# Patient Record
Sex: FEMALE | Race: WHITE | NOT HISPANIC OR LATINO | Employment: FULL TIME | URBAN - METROPOLITAN AREA
[De-identification: names, ages, dates, MRNs, and addresses within clinical notes are randomized per-mention and may not be internally consistent; named-entity substitution may affect disease eponyms.]

---

## 2017-01-30 ENCOUNTER — ALLSCRIPTS OFFICE VISIT (OUTPATIENT)
Dept: OTHER | Facility: OTHER | Age: 27
End: 2017-01-30

## 2017-01-31 ENCOUNTER — LAB CONVERSION - ENCOUNTER (OUTPATIENT)
Dept: OTHER | Facility: OTHER | Age: 27
End: 2017-01-31

## 2017-02-06 ENCOUNTER — ALLSCRIPTS OFFICE VISIT (OUTPATIENT)
Dept: OTHER | Facility: OTHER | Age: 27
End: 2017-02-06

## 2017-03-20 ENCOUNTER — ALLSCRIPTS OFFICE VISIT (OUTPATIENT)
Dept: OTHER | Facility: OTHER | Age: 27
End: 2017-03-20

## 2017-04-17 ENCOUNTER — ALLSCRIPTS OFFICE VISIT (OUTPATIENT)
Dept: OTHER | Facility: OTHER | Age: 27
End: 2017-04-17

## 2017-04-20 ENCOUNTER — ALLSCRIPTS OFFICE VISIT (OUTPATIENT)
Dept: OTHER | Facility: OTHER | Age: 27
End: 2017-04-20

## 2017-04-25 LAB
CHLAMYDIA, DNA PROBE (HISTORICAL): NOT DETECTED
N GONORRHOEAE, AMPLIFIED DNA (HISTORICAL): NOT DETECTED

## 2017-08-01 ENCOUNTER — ALLSCRIPTS OFFICE VISIT (OUTPATIENT)
Dept: OTHER | Facility: OTHER | Age: 27
End: 2017-08-01

## 2017-12-05 ENCOUNTER — OFFICE VISIT (OUTPATIENT)
Dept: URGENT CARE | Facility: CLINIC | Age: 27
End: 2017-12-05
Payer: COMMERCIAL

## 2017-12-05 PROCEDURE — G0382 LEV 3 HOSP TYPE B ED VISIT: HCPCS

## 2017-12-05 PROCEDURE — 99283 EMERGENCY DEPT VISIT LOW MDM: CPT

## 2017-12-07 NOTE — PROGRESS NOTES
Assessment  1  Prepatellar bursitis of left knee (453 65) (T68 42)    Plan  Prepatellar bursitis of left knee    · Cephalexin 500 MG Oral Capsule; TAKE 1 CAPSULE 3 TIMES DAILY UNTIL GONE    Discussion/Summary  Discussion Summary:   Start antibiotic intake as directed  Recommend warm compresses  If not improving over the next week, follow up with PCP  Medication Side Effects Reviewed: Possible side effects of new medications were reviewed with the patient/guardian today  Understands and agrees with treatment plan: The treatment plan was reviewed with the patient/guardian  The patient/guardian understands and agrees with the treatment plan      Chief Complaint    1  Skin Wound  Chief Complaint Free Text Note Form: Pt c/o a reddened lump on her left knee  History of Present Illness  HPI: 25-year-old female here with red lump on her left knee  Has full range of motion in the knee  States that she tried to poke it with hot needle and it did not help it  Now her whole left leg kind of hurts  Denies any fever or chills  Hospital Based Practices Required Assessment:  Abuse And Domestic Violence Screen   Yes, the patient is safe at home  -- The patient states no one is hurting them  Depression And Suicide Screen  No, the patient has not had thoughts of hurting themself  No, the patient has not felt depressed in the past 7 days  Prefered Language is  [place]  Primary Language is  English  Skin Wound: HOLLY FERNANDEZ presents with complaints of skin wound  Review of Systems  Focused-Female:  Constitutional: No fever, no chills, feels well, no tiredness, no recent weight gain or loss  Musculoskeletal: as noted in HPI  ROS Reviewed:   ROS reviewed  Active Problems  1  Encounter for gynecological examination without abnormal finding (V72 31) (Z01 419)   2  History of hysterectomy (V88 01) (Z90 710)    Past Medical History  1  History of Acute sinusitis (461 9) (J01 90)   2   History of Anxiety and depression (300 00,311) (F41 8)   3  History of Bacterial vaginosis (616 10,041 9) (N76 0,B96 89)   4  History of Dysuria (788 1) (R30 0)   5  History of Endometriosis (617 9) (N80 9)   6  History of Female pelvic pain (625 9) (R10 2)   7  History of Granulation tissue at vaginal vault (623 8) (N89 8)   8  History of acute pharyngitis (V12 69) (Z87 09)   9  History of depression (V11 8) (Z86 59)   10  History of fatigue (V13 89) (Z87 898)   11  History of headache (V13 89) (Z87 898)   12  History of headache (V13 89) (Z87 898)   13  History of trichomoniasis (V12 09) (Z86 19)   14  History of vaginal discharge (V13 29) (Z87 42)   15  History of Insect bite, initial encounter (919 4,E906 4) (W57 XXXA)   16  Need for prophylactic vaccination and inoculation against influenza (V04 81) (Z23)   17  History of Need for Tdap vaccination (V06 1) (Z23)   18  History of Normal pregnancy (V22 2) (Z34 90)   19  History of Postoperative examination (V67 00) (Z09)   20  History of Post-operative state (V45 89) (Z98 890)   21  History of Scabies (133 0) (B86)   22  History of Screening for depression (V79 0) (Z13 89)   23  History of Sterilization consult (V25 09) (Z30 09)   24  History of UTI (urinary tract infection) (599 0) (N39 0)   25  Vaginal delivery (V13 29) (Z87 42)   26  History of Vaginal spotting (623 8) (N92 0)  Active Problems And Past Medical History Reviewed: The active problems and past medical history were reviewed and updated today  Family History  Mother    1  Family history of sleep apnea (V19 8) (Z82 0)  Paternal Grandfather    2  Family history of Parkinson's disease (V17 2) (Z82 0)  Family History Reviewed: The family history was reviewed and updated today  Social History   · Being A Social Drinker   · Former smoker (N98 12) (O51 808)   · No living will   · Problem with, marital (V61 10) (Z63 0)  Social History Reviewed: The social history was reviewed and updated today   The social history was reviewed and is unchanged  Surgical History    1  History of Appendectomy   2  History of Laparoscop Fulguration Uterine Surface Endometriotic Tissue   3  History of Tubal Ligation   4  History of Vaginal Hysterectomy  Surgical History Reviewed: The surgical history was reviewed and updated today  Current Meds   1  Multi Vitamin Daily Oral Tablet; TAKE 1 TABLET DAILY; Therapy: [id number] to Recorded  Medication List Reviewed: The medication list was reviewed and updated today  Allergies  1  No Known Drug Allergies    2  No Known Environmental Allergies   3  No Known Food Allergies    Vitals  Signs   Recorded: 09OIT1851 06:44PM   Temperature: 97 7 F  Heart Rate: 86  Respiration: 18  Systolic: 526  Diastolic: 85  Height: 5 ft 7 in  Weight: 154 lb 8 66 oz  BMI Calculated: 24 2  BSA Calculated: 1 81  O2 Saturation: 97    Physical Exam   Constitutional  General appearance: No acute distress, well appearing and well nourished  Pulmonary  Respiratory effort: No increased work of breathing or signs of respiratory distress  Auscultation of lungs: Clear to auscultation  Cardiovascular  Auscultation of heart: Normal rate and rhythm, normal S1 and S2, without murmurs  Musculoskeletal  Gait and station: Normal  -- Left knee with erythema over the prepatellar area  Tender to palpation  Increased warmth        Signatures   Electronically signed by : Lucian Nava, [facility]; Dec  5 2017  7:08PM EST                       (Author)    Electronically signed by : NAS Weeks ; Dec  6 2017  3:27PM EST                       (Co-author)

## 2018-01-09 ENCOUNTER — OFFICE VISIT (OUTPATIENT)
Dept: URGENT CARE | Facility: CLINIC | Age: 28
End: 2018-01-09
Payer: COMMERCIAL

## 2018-01-09 PROCEDURE — G0382 LEV 3 HOSP TYPE B ED VISIT: HCPCS

## 2018-01-09 PROCEDURE — 99283 EMERGENCY DEPT VISIT LOW MDM: CPT

## 2018-01-12 VITALS
DIASTOLIC BLOOD PRESSURE: 70 MMHG | WEIGHT: 148.13 LBS | BODY MASS INDEX: 23.25 KG/M2 | HEIGHT: 67 IN | SYSTOLIC BLOOD PRESSURE: 110 MMHG

## 2018-01-12 NOTE — RESULT NOTES
Verified Results  * US PELVIS COMPLETE (TRANSABDOMINAL AND TRANSVAGINAL) [id number] 07:33AM [facility] Order Number: [id number]     Test Name Result Flag Reference   US PELVIS COMPLETE (TRANSABDOMINAL AND TRANSVAGINAL) (Report)     PELVIC ULTRASOUND, COMPLETE     INDICATION: Irregular bleeding since     History of endometriosis  COMPARISON: None  TECHNIQUE:  Transabdominal pelvic ultrasound was performed in sagittal and transverse planes with a curvilinear transducer  Additional transvaginal imaging was performed to better evaluate the endometrium and ovaries  Imaging included volumetric    sweeps as well as traditional still imaging technique  FINDINGS:     UTERUS:   The uterus is anteverted in position, measuring 9 2 x 2 9 x 5 0 cm  Contour and echotexture appear normal      The cervix shows no suspicious abnormality  ENDOMETRIUM:    Normal caliber of 3 mm  Homogenous and normal in appearance  OVARIES/ADNEXA:   Right ovary: 2 3 x 1 8 x 1 7 cm  No suspicious right ovarian abnormality  Doppler flow within normal limits  Left ovary: 2 5 x 2 4 x 1 2 cm  No suspicious left ovarian abnormality  Doppler flow within normal limits  No suspicious adnexal mass or loculated collections  There is no free fluid  IMPRESSION:      Prominent uterine and left adnexal vessels may indicate an element of pelvic congestion syndrome  Otherwise, unremarkable  Workstation performed: [id number]     Signed by:   Rhona Severance Gerldine Gemma, MD   16

## 2018-01-12 NOTE — PROGRESS NOTES
Assessment   1  Acute sinusitis (461 9) (J01 90)   2  Bronchospasm, acute (519 11) (J98 01)    Plan   Acute sinusitis    · Amoxicillin-Pot Clavulanate 875-125 MG Oral Tablet; TAKE 1 TABLET EVERY 12    HOURS DAILY  Bronchospasm, acute    · Proventil  (90 Base) MCG/ACT Inhalation Aerosol Solution; INHALE 1-2    PUFFS EVERY 4-6 HOURS AS NEEDED AND AS DIRECTED    Discussion/Summary   Discussion Summary:    Start antibiotic intake as directed  Recommend using albuterol inhaler every 4-6 hours as needed for shortness of breath or chest tightness  If symptoms are not improving over the next 3-5 days, follow with PCP  Medication Side Effects Reviewed: Possible side effects of new medications were reviewed with the patient/guardian today  Understands and agrees with treatment plan: The treatment plan was reviewed with the patient/guardian  The patient/guardian understands and agrees with the treatment plan      Chief Complaint   1  Cold Symptoms  Chief Complaint Free Text Note Form: chest congestion and cough x 6 weeks      History of Present Illness   HPI: 77-year-old female here with chest congestion and cough for the last 6 weeks  Denies any fever chills  Cough is productive with thick sputum  Has been taking over-the-counter cough and cold preparations with no relief  Feels tight and short of breath at times  Has a lot of sinus pressure and persistent headache  Review of Systems   Focused-Female:      Constitutional: feeling poorly-- and-- feeling tired, but-- as noted in HPI,-- no fever-- and-- no chills  ENT: sore throat-- and-- nasal discharge, but-- as noted in HPI  Cardiovascular: no complaints of slow or fast heart rate, no chest pain, no palpitations, no leg claudication or lower extremity edema  Respiratory: shortness of breath-- and-- cough, but-- as noted in HPI-- and-- no wheezing  ROS Reviewed:    ROS reviewed  Active Problems   1   Encounter for gynecological examination without abnormal finding (V72 31) (Z01 419)   2  History of hysterectomy (V88 01) (Z98 890,Z90 710)   3  Prepatellar bursitis of left knee (726 65) (M70 42)    Past Medical History   1  History of Acute sinusitis (461 9) (J01 90)   2  History of Anxiety and depression (300 00,311) (F41 8)   3  History of Bacterial vaginosis (616 10,041 9) (N76 0,B96 89)   4  History of Dysuria (788 1) (R30 0)   5  History of Endometriosis (617 9) (N80 9)   6  History of Female pelvic pain (625 9) (R10 2)   7  History of Granulation tissue at vaginal vault (623 8) (N89 8)   8  History of acute pharyngitis (V12 69) (Z87 09)   9  History of depression (V11 8) (Z86 59)   10  History of fatigue (V13 89) (Z87 898)   11  History of headache (V13 89) (Z87 898)   12  History of headache (V13 89) (Z87 898)   13  History of trichomoniasis (V12 09) (Z86 19)   14  History of vaginal discharge (V13 29) (Z87 42)   15  History of Insect bite, initial encounter (919 4,E906 4) (W57 XXXA)   16  Need for prophylactic vaccination and inoculation against influenza (V04 81) (Z23)   17  History of Need for Tdap vaccination (V06 1) (Z23)   18  History of Normal pregnancy (V22 2) (Z34 90)   19  History of Postoperative examination (V67 00) (Z09)   20  History of Post-operative state (V45 89) (Z98 890)   21  History of Scabies (133 0) (B86)   22  History of Screening for depression (V79 0) (Z13 89)   23  History of Sterilization consult (V25 09) (Z30 09)   24  History of UTI (urinary tract infection) (599 0) (N39 0)   25  Vaginal delivery (V13 29) (Z87 42)   26  History of Vaginal spotting (623 8) (N92 0)  Active Problems And Past Medical History Reviewed: The active problems and past medical history were reviewed and updated today  Family History   Mother    1  Family history of sleep apnea (V19 8) (Z82 0)  Paternal Grandfather    2  Family history of Parkinson's disease (V17 2) (Z82 0)  Family History Reviewed:     The family history was reviewed and updated today  Social History    · Being A Social Drinker   · Former smoker (Z71 46) (Z74 809)   · No living will   · Problem with, marital (V61 10) (Z63 0)  Social History Reviewed: The social history was reviewed and updated today  The social history was reviewed and is unchanged  Surgical History   1  History of Appendectomy   2  History of Laparoscop Fulguration Uterine Surface Endometriotic Tissue   3  History of Tubal Ligation   4  History of Vaginal Hysterectomy  Surgical History Reviewed: The surgical history was reviewed and updated today  Current Meds    1  Multi Vitamin Daily Oral Tablet; TAKE 1 TABLET DAILY; Therapy: 60PGP1805 to Recorded  Medication List Reviewed: The medication list was reviewed and updated today  Allergies   1  No Known Drug Allergies  2  No Known Environmental Allergies   3  No Known Food Allergies    Vitals   Signs   Recorded: 79IXE9228 12:40PM   Temperature: 98 F  Heart Rate: 84  Respiration: 15  Systolic: 347  Diastolic: 75  O2 Saturation: 94    Physical Exam        Constitutional      General appearance: No acute distress, well appearing and well nourished  Ears, Nose, Mouth, and Throat      External inspection of ears and nose: Normal        Otoscopic examination: Tympanic membranes translucent with normal light reflex  Canals patent without erythema  Nasal mucosa, septum, and turbinates: Abnormal   There was a mucoid discharge from both nares  The bilateral nasal mucosa was edematous-- and-- red  Oropharynx: Abnormal   The posterior pharynx was erythematous-- and-- Posterior pharynx with thick postnasal drip , but-- did not have an exudate  Pulmonary      Respiratory effort: No increased work of breathing or signs of respiratory distress  Auscultation of lungs: Clear to auscultation  Cardiovascular      Auscultation of heart: Normal rate and rhythm, normal S1 and S2, without murmurs         Signatures Electronically signed by : Zoë Pritchard, [facility]; Jan 9 2018 12:57PM EST                       (Author)     Electronically signed by : NAS Murrell ; Jan 11 2018  9:53AM EST                       (Co-author)

## 2018-01-13 VITALS
SYSTOLIC BLOOD PRESSURE: 116 MMHG | WEIGHT: 146.13 LBS | HEIGHT: 67 IN | DIASTOLIC BLOOD PRESSURE: 66 MMHG | BODY MASS INDEX: 22.93 KG/M2

## 2018-01-13 VITALS
BODY MASS INDEX: 23.29 KG/M2 | SYSTOLIC BLOOD PRESSURE: 114 MMHG | HEIGHT: 67 IN | WEIGHT: 148.38 LBS | DIASTOLIC BLOOD PRESSURE: 70 MMHG

## 2018-01-13 NOTE — RESULT NOTES
Verified Results  (Q) BV-VAGINITIS PANEL DNA PROBE [id number] 12:00AM Seema Mast     Test Name Result Flag Reference   BV/VAGINITIS [facility] PROBE      BV/VAGINITIS PANEL DNA PROBE         MICRO NUMBER:      [id number]    TEST STATUS:       FINAL    SPECIMEN SOURCE:   NOT GIVEN    SPECIMEN QUALITY:  ADEQUATE    TRICHOMONAS:       Not Detected    GARDNERELLA:       Not Detected    DOREEN:           Not Detected

## 2018-01-13 NOTE — RESULT NOTES
Verified Results  (Q) THINPREP PAP [id number] 12:00AM Radha Perry     Test Name Result Flag Reference   CLINICAL INFORMATION:      none given   LMP:      280718   PREV  PAP:      NONE GIVEN   PREV  BX:      NONE GIVEN   SOURCE:      [facility]   STATEMENT OF ADEQUACY:      Specimen processed and examined, but  unsatisfactory for evaluation due to  an insufficient number of squamous  cells  INTERPRETATION/RESULT:      Unable to provide interpretation due to  unsatisfactory specimen adequacy     CYTOTECHNOLOGIST:      MTT, CT(ASCP)  CT screening location: [facility], [address] [place] [address] Road   REVIEW CYTOTECHNOLOGIST:      SPS,CT(ASCP)  Ct screening location: [address], [address] [place] [address]

## 2018-01-14 VITALS
HEIGHT: 67 IN | BODY MASS INDEX: 22.81 KG/M2 | WEIGHT: 145.31 LBS | SYSTOLIC BLOOD PRESSURE: 100 MMHG | DIASTOLIC BLOOD PRESSURE: 66 MMHG

## 2018-01-16 NOTE — PROGRESS NOTES
Assessment    1  History of Vaginal Hysterectomy   2  Post-operative state (V45 89) (Z98 89)   3  Granulation tissue at vaginal vault (623 8) (N89 8)    Plan  Post-operative state    · (Q) TISSUE PATHOLOGY; Status:Active; Requested for:79Ajx0599;    Perform:[facility]; Order Comments:granulation tissue from the vaginal cuff ; Due:16Kev0960;Ordered; For:Post-operative state; Ordered By:Chetan Burt Bud;  Vaginal discharge    · (1) VAGINITIS/ VAGINOSIS, DNA ( AFFIRM); Status:Active; Requested for:94Cpn0832;    Perform:[facility]; Due:65Ooe7918;Ordered; For:Vaginal discharge; Ordered By:Chetan Burt Mesick; Discussion/Summary  Discussion Summary:   Aware of findings / aware no intercourse or tampons at this time   bleeding likely secondary to granulation tissue   follow up in 2 weeks  Chief Complaint  Chief Complaint Free Text Note Form: pt presents for discharge and vaginal bleeding after hysterectomy  History of Present Illness  HPI: 23 yo s/p hysterectomy approx 8 weeks ago coming in for evaluation / states she has been having some vaginal discharge unsure if has infection / also has noticed some red and brown vaginal spotting      Review of Systems  Focused-Female:   Genitourinary: as noted in HPI  Active Problems    1  Acute pharyngitis (462) (J02 9)   2  Depression (311) (F32 9)   3  Dysuria (788 1) (R30 0)   4  Encounter for gynecological examination without abnormal finding (V72 31) (Z01 419)   5  Fatigue (780 79) (R53 83)   6  Female pelvic pain (625 9) (R10 2)   7  Headache (784 0) (R51)   8  Post-operative state (V45 89) (Z98 89)    Past Medical History    1  History of Acute sinusitis (461 9) (J01 90)   2  History of Endometriosis (617 9) (N80 9)   3  History of headache (V13 89) (Z87 898)   4  Need for prophylactic vaccination and inoculation against influenza (V04 81) (Z23)   5  History of Need for Tdap vaccination (V06 1) (Z23)   6  History of Normal pregnancy (V22 2) (Z34 90)   7  History of Postoperative examination (V67 00) (Z09)   8  History of Scabies (133 0) (B86)   9  History of Sterilization consult (V25 09) (Z30 09)   10  History of UTI (urinary tract infection) (599 0) (N39 0)   11  Vaginal delivery (V13 29) (T55 75)  Active Problems And Past Medical History Reviewed: The active problems and past medical history were reviewed and updated today  Surgical History    1  History of Laparoscop Fulguration Uterine Surface Endometriotic Tissue   2  History of Tubal Ligation   3  History of Vaginal Hysterectomy  Surgical History Reviewed: The surgical history was reviewed and updated today  Family History  Mother    1  Family history of sleep apnea (V19 8) (Z82 0)  Paternal Grandfather    2  Family history of Parkinson's disease (V17 2) (Z82 0)  Family History Reviewed: The family history was reviewed and updated today  Social History    · Being A Social Drinker   · Former smoker (Z12 66) (C68 114)   · Problem with, marital (V61 10) (Z63 0)    Current Meds   1  No Reported Medications Recorded   2  No Reported Medications Recorded   3  No Reported Medications Recorded   4  No Reported Medications Recorded    Allergies    1  No Known Drug Allergies    2  No Known Environmental Allergies   3  No Known Food Allergies    Vitals  Vital Signs    Recorded: 50VMR7531 89:50PJ   Systolic 045   Diastolic 64   Height 5 ft 7 in   Weight 142 lb 0 96 oz   BMI Calculated 22 25   BSA Calculated 1 75     Physical Exam    Constitutional   General appearance: No acute distress, well appearing and well nourished  Pulmonary   Respiratory effort: No increased work of breathing or signs of respiratory distress  Genitourinary   External genitalia: Normal and no lesions appreciated  at vaginal cuff two areas of granulation tissue that easily blead to touch / tissue removed with forceps ans silver nitrate applied / excellent hemostasis / otherwise normal vaginal cuff     Urethra: Normal  Urethral meatus: Normal     Bladder: Normal, soft, non-tender and no prolapse or masses appreciated  Cervix: Surgically absent  Uterus: Surgically absent  Psychiatric   Orientation to person, place, and time: Normal     Mood and affect: Normal        Future Appointments    Date/Time Provider Specialty Site   06/19/2017 08:00 AM ONI Nance  Obstetrics/Gynecology OB GYN CARE [facility]   09/29/2016 02:15 PM ONI Nye  Obstetrics/Gynecology OB GYN CARE [facility] [place] [place]     Signatures   Electronically signed by :  ONI Vail ; Sep 16 2016  1:10PM EST                       (Author)

## 2018-01-17 NOTE — RESULT NOTES
Verified Results  (1) URINE CULTURE [id number] 12:00AM [place], [address]     Test Name Result Flag Reference   CULTURE, URINE, ROUTINE      CULTURE, URINE, ROUTINE         MICRO NUMBER:      [id number]    TEST STATUS:       FINAL    SPECIMEN SOURCE:   URINE    SPECIMEN QUALITY:  ADEQUATE    RESULT:            Multiple organisms present, each less than 10,000                       CFU/mL  These organisms, commonly found on                       external and internal genitalia, are considered                       to be colonizers  No further testing performed

## 2018-01-23 VITALS
BODY MASS INDEX: 24.26 KG/M2 | HEART RATE: 86 BPM | OXYGEN SATURATION: 97 % | DIASTOLIC BLOOD PRESSURE: 85 MMHG | WEIGHT: 154.54 LBS | SYSTOLIC BLOOD PRESSURE: 124 MMHG | RESPIRATION RATE: 18 BRPM | HEIGHT: 67 IN | TEMPERATURE: 97.7 F

## 2018-01-23 VITALS
SYSTOLIC BLOOD PRESSURE: 127 MMHG | DIASTOLIC BLOOD PRESSURE: 75 MMHG | HEART RATE: 84 BPM | RESPIRATION RATE: 15 BRPM | TEMPERATURE: 98 F | OXYGEN SATURATION: 94 %

## 2018-01-27 ENCOUNTER — OFFICE VISIT (OUTPATIENT)
Dept: URGENT CARE | Facility: CLINIC | Age: 28
End: 2018-01-27
Payer: COMMERCIAL

## 2018-01-27 VITALS
SYSTOLIC BLOOD PRESSURE: 105 MMHG | TEMPERATURE: 97.9 F | OXYGEN SATURATION: 99 % | DIASTOLIC BLOOD PRESSURE: 64 MMHG | RESPIRATION RATE: 16 BRPM | HEART RATE: 76 BPM

## 2018-01-27 DIAGNOSIS — L23.9 ALLERGIC CONTACT DERMATITIS, UNSPECIFIED TRIGGER: Primary | ICD-10-CM

## 2018-01-27 PROCEDURE — 99283 EMERGENCY DEPT VISIT LOW MDM: CPT | Performed by: PHYSICIAN ASSISTANT

## 2018-01-27 PROCEDURE — G0382 LEV 3 HOSP TYPE B ED VISIT: HCPCS | Performed by: PHYSICIAN ASSISTANT

## 2018-01-27 RX ORDER — PREDNISONE 50 MG/1
50 TABLET ORAL DAILY
Qty: 5 TABLET | Refills: 0 | Status: SHIPPED | OUTPATIENT
Start: 2018-01-27 | End: 2018-04-05

## 2018-01-27 RX ORDER — ALPRAZOLAM 0.25 MG/1
1 TABLET ORAL EVERY 12 HOURS PRN
COMMUNITY
Start: 2017-03-20 | End: 2018-04-05

## 2018-01-27 RX ORDER — CITALOPRAM 20 MG/1
1 TABLET ORAL DAILY
COMMUNITY
Start: 2017-03-20 | End: 2018-04-05

## 2018-01-27 RX ORDER — ALBUTEROL SULFATE 90 UG/1
1-2 AEROSOL, METERED RESPIRATORY (INHALATION)
COMMUNITY
Start: 2018-01-09 | End: 2018-04-05

## 2018-01-27 NOTE — PROGRESS NOTES
Assessment/Plan:    No problem-specific Assessment & Plan notes found for this encounter  Diagnoses and all orders for this visit:    Allergic contact dermatitis, unspecified trigger  -     predniSONE 50 mg tablet; Take 1 tablet (50 mg total) by mouth daily    Other orders  -     ALPRAZolam (XANAX) 0 25 mg tablet; Take 1 tablet by mouth every 12 (twelve) hours as needed  -     citalopram (CeleXA) 20 mg tablet; Take 1 tablet by mouth daily  -     Multiple Vitamins-Minerals (MULTIVITAL) tablet; Take 1 tablet by mouth daily  -     albuterol (PROVENTIL HFA) 90 mcg/act inhaler; Inhale 1-2 puffs        There are no Patient Instructions on file for this visit  Subjective:      Patient ID: Collins Ignacio is a 32 y o  female  Rash   This is a new problem  The current episode started in the past 7 days  The problem has been gradually worsening since onset  Location: posterior thighs  The rash is characterized by redness and itchiness  Associated with: new exercise pants  Pertinent negatives include no anorexia, congestion, cough, diarrhea, eye pain, facial edema, fatigue, fever, joint pain, nail changes, rhinorrhea, shortness of breath, sore throat or vomiting  Past treatments include antihistamine  The treatment provided no relief  The following portions of the patient's history were reviewed and updated as appropriate: allergies, current medications, past family history, past medical history, past social history, past surgical history and problem list     Review of Systems   Constitutional: Negative for fatigue and fever  HENT: Negative for congestion, rhinorrhea and sore throat  Eyes: Negative for pain  Respiratory: Negative for cough and shortness of breath  Gastrointestinal: Negative for anorexia, diarrhea and vomiting  Musculoskeletal: Negative for joint pain  Skin: Positive for rash  Negative for nail changes           Objective:     Physical Exam   Constitutional: Vital signs are normal  She appears well-developed and well-nourished  Cardiovascular: Normal rate and regular rhythm  Pulmonary/Chest: Effort normal and breath sounds normal    Abdominal: Normal appearance and bowel sounds are normal    Skin: Skin is warm and dry  Rash noted  Rash is papular (erythematous papules on bilateral posterior thighs; no discharge; no ecchymosis)

## 2018-02-20 ENCOUNTER — OFFICE VISIT (OUTPATIENT)
Dept: OBGYN CLINIC | Facility: CLINIC | Age: 28
End: 2018-02-20
Payer: COMMERCIAL

## 2018-02-20 VITALS — SYSTOLIC BLOOD PRESSURE: 110 MMHG | DIASTOLIC BLOOD PRESSURE: 74 MMHG | BODY MASS INDEX: 23.78 KG/M2 | WEIGHT: 151.8 LBS

## 2018-02-20 DIAGNOSIS — Z11.3 SCREEN FOR STD (SEXUALLY TRANSMITTED DISEASE): ICD-10-CM

## 2018-02-20 DIAGNOSIS — N94.9 GENITAL LESION, FEMALE: Primary | ICD-10-CM

## 2018-02-20 PROCEDURE — 86695 HERPES SIMPLEX TYPE 1 TEST: CPT | Performed by: OBSTETRICS & GYNECOLOGY

## 2018-02-20 PROCEDURE — 86592 SYPHILIS TEST NON-TREP QUAL: CPT | Performed by: OBSTETRICS & GYNECOLOGY

## 2018-02-20 PROCEDURE — 36415 COLL VENOUS BLD VENIPUNCTURE: CPT | Performed by: OBSTETRICS & GYNECOLOGY

## 2018-02-20 PROCEDURE — 80074 ACUTE HEPATITIS PANEL: CPT | Performed by: OBSTETRICS & GYNECOLOGY

## 2018-02-20 PROCEDURE — 87389 HIV-1 AG W/HIV-1&-2 AB AG IA: CPT | Performed by: OBSTETRICS & GYNECOLOGY

## 2018-02-20 PROCEDURE — 99213 OFFICE O/P EST LOW 20 MIN: CPT | Performed by: OBSTETRICS & GYNECOLOGY

## 2018-02-20 PROCEDURE — 86696 HERPES SIMPLEX TYPE 2 TEST: CPT | Performed by: OBSTETRICS & GYNECOLOGY

## 2018-02-20 PROCEDURE — 87529 HSV DNA AMP PROBE: CPT | Performed by: OBSTETRICS & GYNECOLOGY

## 2018-02-20 RX ORDER — DOXYCYCLINE 100 MG/1
100 TABLET ORAL 2 TIMES DAILY
Qty: 14 TABLET | Refills: 0 | Status: SHIPPED | OUTPATIENT
Start: 2018-02-20 | End: 2018-02-27

## 2018-02-20 NOTE — PROGRESS NOTES
Assessment Devonte Birmingham was seen today for follow-up  Diagnoses and all orders for this visit:    Genital lesion, female         Plan  Genital lesions appear to be more consistent with folliculitis/acne  Will treat empirically with doxycycline x7 days  Patient also get STD testing today  Will contact with all results  All questions answered  Subjective   Chandana Lima is a 32 y o  female here for a problem visit  Patient is complaining of bumps  Pt reports partner is reporting bumps too  Bumps are itching and hurt  Last time she had a similar episode 4-5 months ago  Bumps usually come, run their course and spontaneously resolve  Patient has been with current partner x 1 year  Patient Active Problem List   Diagnosis    Pelvic pain    Acute appendicitis with localized peritonitis       Gynecologic History  No LMP recorded (lmp unknown)  Patient has had a hysterectomy  The current method of family planning is status post hysterectomy  Past Medical History:   Diagnosis Date    Depression     No meds currently   Endometriosis     Irregular menses     Migraines     Pelvic pain in female     Left pelvic area  Past Surgical History:   Procedure Laterality Date    HYSTERECTOMY      PELVIC LAPAROSCOPY      [place] LAP,APPENDECTOMY N/A 10/6/2016    Procedure: APPENDECTOMY LAPAROSCOPIC;  Surgeon: Scottie Denver, MD;  Location: [facility];  Service: General    [place] LAPAROSCOPY W TOT HYSTERECTUTERUS <=[address]  W TUBE/OVARY N/A 7/25/2016    Procedure: HYSTERECTOMY LAPAROSCOPIC TOTAL  LSO  right salpingectomy  diagnostic cystoscopy;  Surgeon: Jeffrey Bingham MD;  Location: [facility];  Service: Gynecology    TUBAL LIGATION       No family history on file  Social History     Social History    Marital status: /Civil Union     Spouse name: N/A    Number of children: N/A    Years of education: N/A     Occupational History    Not on file       Social History Main Topics    Smoking status: Former Smoker     Years: 5 00     Types: Cigarettes    Smokeless tobacco: Never Used      Comment: Quit 5 years ago  Smoked 5 cigarettes daily    Alcohol use Yes      Comment: 4 monthly    Drug use: No    Sexual activity: Yes     Partners: Male     Other Topics Concern    Not on file     Social History Narrative    No narrative on file     No Known Allergies    Current Outpatient Prescriptions:     albuterol (PROVENTIL HFA) 90 mcg/act inhaler, Inhale 1-2 puffs, Disp: , Rfl:     ALPRAZolam (XANAX) 0 25 mg tablet, Take 1 tablet by mouth every 12 (twelve) hours as needed, Disp: , Rfl:     citalopram (CeleXA) 20 mg tablet, Take 1 tablet by mouth daily, Disp: , Rfl:     Multiple Vitamins-Minerals (MULTIVITAL) tablet, Take 1 tablet by mouth daily, Disp: , Rfl:     predniSONE 50 mg tablet, Take 1 tablet (50 mg total) by mouth daily, Disp: 5 tablet, Rfl: 0    Review of Systems  Constitutional :no fever, feels well, no tiredness, no recent weight gain or loss  ENT: no ear ache, no loss of hearing, no nosebleeds or nasal discharge, no sore throat or hoarseness  Cardiovascular: no complaints of slow or fast heart beat, no chest pain, no palpitations, no leg claudication or lower extremity edema  Respiratory: no complaints of shortness of shortness of breath, no GUZMAN  Breasts:no complaints of breast pain, breast lump, or nipple discharge  Gastrointestinal: no complaints of abdominal pain, constipation,nausea, vomiting, or diarrhea or bloody stools  Genitourinary : no complaints of dysuria, incontinence, pelvic pain, no dysmenorrhea, vaginal discharge or abnormal vaginal bleeding and as noted in HPI    Integumentary: no complaints of skin rash or lesion, itching or dry skin  Neurological: no complaints of headache, no confusion, no numbness or tingling, no dizziness or fainting     Objective     /74   Wt 68 9 kg (151 lb 12 8 oz)   LMP  (LMP Unknown) Comment: uhcg negative  BMI 23 78 kg/m²     Constitutional General appearance: No acute distress, well appearing and well nourished  Psychiatric   Orientation to person, place, and time: Normal     Mood and affect: Normal     Skin: erythematous flat 0 5 cm erythematous macules with small head noted throughout lower buttocks b/l and inner thigh  No lesions on vulva  Head appears to be healing with some areas with scabbing along head  ~6-8 total lesions

## 2018-02-22 LAB
HAV IGM SER QL: NORMAL
HBV CORE IGM SER QL: NORMAL
HBV SURFACE AG SER QL: NORMAL
HCV AB SER QL: NORMAL
HSV1 IGG SER IA-ACNC: 17.3 INDEX (ref 0–0.9)
HSV2 IGG SER IA-ACNC: <0.91 INDEX (ref 0–0.9)
RPR SER QL: NORMAL

## 2018-02-23 LAB
HIV 1+2 AB+HIV1 P24 AG SERPL QL IA: NORMAL
HSV1 DNA SPEC QL NAA+PROBE: NEGATIVE
HSV2 DNA SPEC QL NAA+PROBE: NEGATIVE

## 2018-02-26 NOTE — PROGRESS NOTES
Please notify patient of normal results  HIV and HSV neg  Please see what happened to RPR and hepatitis panel

## 2018-03-09 LAB — HSV1+2 IGM SER IA-ACNC: NORMAL

## 2018-03-30 ENCOUNTER — OFFICE VISIT (OUTPATIENT)
Dept: URGENT CARE | Facility: CLINIC | Age: 28
End: 2018-03-30
Payer: COMMERCIAL

## 2018-03-30 VITALS
SYSTOLIC BLOOD PRESSURE: 118 MMHG | OXYGEN SATURATION: 97 % | RESPIRATION RATE: 16 BRPM | TEMPERATURE: 96.2 F | DIASTOLIC BLOOD PRESSURE: 69 MMHG | HEART RATE: 67 BPM

## 2018-03-30 DIAGNOSIS — S05.02XA ABRASION OF LEFT CORNEA, INITIAL ENCOUNTER: Primary | ICD-10-CM

## 2018-03-30 PROCEDURE — 99283 EMERGENCY DEPT VISIT LOW MDM: CPT | Performed by: NURSE PRACTITIONER

## 2018-03-30 PROCEDURE — G0382 LEV 3 HOSP TYPE B ED VISIT: HCPCS | Performed by: NURSE PRACTITIONER

## 2018-03-30 RX ORDER — TOBRAMYCIN 3 MG/ML
1 SOLUTION/ DROPS OPHTHALMIC
Qty: 1.3 ML | Refills: 0 | Status: SHIPPED | OUTPATIENT
Start: 2018-03-30 | End: 2018-04-05

## 2018-03-30 NOTE — PATIENT INSTRUCTIONS
Corneal Abrasion   AMBULATORY CARE:   A corneal abrasion  is a scratch on the cornea of your eye  The cornea is the clear layer that covers the front of your eye  A small scratch may heal in 1 to 2 days  Deeper or larger scratches may take longer to heal         Common signs and symptoms include the following:   · Pain    · More tears than usual    · Redness    · A feeling that you have something in your eye    · Blurred vision    · Sensitivity to bright light    · Headache  Contact your healthcare provider if:   · Your eye pain or vision gets worse  · You have yellow or green drainage from your eye  · You have questions or concerns about your condition or care  Treatment for a corneal abrasion  may include antibiotic eyedrops or ointment to help prevent an eye infection  You may also be given eye drops to decrease pain  Do not rub your eyes  Do not wear contact lenses until your healthcare provider tells you that it is okay  Wear sunglasses in bright light until your eyes feel better  Prevent corneal abrasions:   · Remove your contact lenses if your eyes feel dry or irritated  · Wash your hands if you need to touch your eyes or your face  · Trim your child's fingernails so he cannot scratch his eye  · Wear protective eyewear when you work with chemicals, wood, dust, or metal      · Wear protective eyewear when you play sports  · Do not wear your contacts for longer than you should  · Do not wear colored lenses or lenses with shapes on them  These lenses may cause eye damage and vision loss  · Do not wear glitter makeup  Glitter can easily get into your eyes and under contact lenses  · Do not sleep with your contacts in your eyes  © 2017 [employer]  Information is for End User's use only and may not be sold, redistributed or otherwise used for commercial purposes   All illustrations and images included in CareNotes® are the copyrighted property of A [employer] M [employer] Inc  or [employer]  The above information is an  only  It is not intended as medical advice for individual conditions or treatments  Talk to your doctor, nurse or pharmacist before following any medical regimen to see if it is safe and effective for you

## 2018-03-30 NOTE — PROGRESS NOTES
[facility]        NAME: Kahlil Sawant is a 32 y o  female  : 1990    MRN: [id number]  DATE: 2018  TIME: 3:24 PM    Assessment and Plan   Abrasion of left cornea, initial encounter [S05  02XA]  1  Abrasion of left cornea, initial encounter  tobramycin (TOBREX) 0 3 % SOLN         Patient Instructions       Follow up with PCP in 3-5 days  Proceed to  ER if symptoms worsen  Chief Complaint     Chief Complaint   Patient presents with    Eye Pain     today         History of Present Illness       51-year-old female presents urgent care with chief complaint of left eye pain redness swelling watery discharge starting today  States is having some light sensitivity no visual disturbances at this time  She has not used any over-the-counter medications reports no change in symptoms at this time      Eye Pain    Associated symptoms include an eye discharge, eye redness and photophobia  Pertinent negatives include no itching  Review of Systems   Review of Systems   Constitutional: Negative  HENT: Negative  Eyes: Positive for photophobia, pain, discharge and redness  Negative for itching and visual disturbance  Respiratory: Negative  Cardiovascular: Negative  Gastrointestinal: Negative  Endocrine: Negative  Genitourinary: Negative  Musculoskeletal: Negative  Skin: Negative  Allergic/Immunologic: Negative  Neurological: Negative  Hematological: Negative  Psychiatric/Behavioral: Negative            Current Medications       Current Outpatient Prescriptions:     albuterol (PROVENTIL HFA) 90 mcg/act inhaler, Inhale 1-2 puffs, Disp: , Rfl:     ALPRAZolam (XANAX) 0 25 mg tablet, Take 1 tablet by mouth every 12 (twelve) hours as needed, Disp: , Rfl:     citalopram (CeleXA) 20 mg tablet, Take 1 tablet by mouth daily, Disp: , Rfl:     Multiple Vitamins-Minerals (MULTIVITAL) tablet, Take 1 tablet by mouth daily, Disp: , Rfl:     predniSONE 50 mg tablet, Take 1 tablet (50 mg total) by mouth daily, Disp: 5 tablet, Rfl: 0    tobramycin (TOBREX) 0 3 % SOLN, Administer 1 drop into the left eye every 4 (four) hours while awake for 5 days, Disp: 1 3 mL, Rfl: 0    Current Allergies     Allergies as of 03/30/2018    (No Known Allergies)            The following portions of the patient's history were reviewed and updated as appropriate: allergies, current medications, past family history, past medical history, past social history, past surgical history and problem list      Past Medical History:   Diagnosis Date    Depression     No meds currently   Endometriosis     Irregular menses     Migraines     Pelvic pain in female     Left pelvic area  Past Surgical History:   Procedure Laterality Date    HYSTERECTOMY      PELVIC LAPAROSCOPY      [place] LAP,APPENDECTOMY N/A 10/6/2016    Procedure: APPENDECTOMY LAPAROSCOPIC;  Surgeon: Isabel Lowe MD;  [facility];  Service: General    [place] LAPAROSCOPY W TOT HYSTERECTUTERUS <=250 [address]  W TUBE/OVARY N/A 7/25/2016    Procedure: HYSTERECTOMY LAPAROSCOPIC TOTAL  LSO  right salpingectomy  diagnostic cystoscopy;  Surgeon: Tiny Head MD;  [facility];  Service: Gynecology    TUBAL LIGATION         No family history on file  Medications have been verified  Objective   /69   Pulse 67   Temp (!) 96 2 °F (35 7 °C)   Resp 16   LMP  (LMP Unknown) Comment: uhcg negative  SpO2 97%        Physical Exam     Physical Exam   Constitutional: She is oriented to person, place, and time  Vital signs are normal  She appears well-developed and well-nourished  She is active and cooperative  HENT:   Head: Normocephalic and atraumatic  Right Ear: Hearing and external ear normal    Left Ear: Hearing and external ear normal    Nose: Nose normal    Mouth/Throat: Oropharynx is clear and moist    Eyes: EOM and lids are normal  Pupils are equal, round, and reactive to light   Lids are everted and swept, no foreign bodies found  Right conjunctiva is not injected  Right conjunctiva has no hemorrhage  Left conjunctiva is injected  Slit lamp exam:       The left eye shows corneal abrasion and fluorescein uptake  The left eye shows no corneal flare, no corneal ulcer, no foreign body, no hyphema and no hypopyon  Cardiovascular: Normal rate, regular rhythm, normal heart sounds and normal pulses  Pulmonary/Chest: Effort normal and breath sounds normal    Neurological: She is alert and oriented to person, place, and time  Skin: Skin is warm, dry and intact  Psychiatric: She has a normal mood and affect   Her speech is normal and behavior is normal  Judgment and thought content normal  Cognition and memory are normal

## 2018-04-05 ENCOUNTER — OFFICE VISIT (OUTPATIENT)
Dept: FAMILY MEDICINE CLINIC | Facility: CLINIC | Age: 28
End: 2018-04-05
Payer: COMMERCIAL

## 2018-04-05 VITALS
WEIGHT: 157.4 LBS | SYSTOLIC BLOOD PRESSURE: 122 MMHG | BODY MASS INDEX: 24.71 KG/M2 | DIASTOLIC BLOOD PRESSURE: 82 MMHG | HEIGHT: 67 IN

## 2018-04-05 DIAGNOSIS — S29.019S: ICD-10-CM

## 2018-04-05 DIAGNOSIS — S39.012S LUMBAR STRAIN, SEQUELA: ICD-10-CM

## 2018-04-05 DIAGNOSIS — S16.1XXS STRAIN OF NECK MUSCLE, SEQUELA: ICD-10-CM
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
## 2018-04-05 PROCEDURE — 99214 OFFICE O/P EST MOD 30 MIN: CPT | Performed by: PHYSICIAN ASSISTANT

## 2018-04-05 RX ORDER — METHOCARBAMOL 500 MG/1
500 TABLET, FILM COATED ORAL 4 TIMES DAILY
COMMUNITY
End: 2018-06-26 | Stop reason: ALTCHOICE

## 2018-04-05 RX ORDER — NAPROXEN 500 MG/1
500 TABLET ORAL 2 TIMES DAILY WITH MEALS
Qty: 30 TABLET | Refills: 0 | Status: SHIPPED | OUTPATIENT
Start: 2018-04-05 | End: 2018-06-26 | Stop reason: ALTCHOICE
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
## 2018-06-26 ENCOUNTER — OFFICE VISIT (OUTPATIENT)
Dept: URGENT CARE | Facility: CLINIC | Age: 28
End: 2018-06-26
Payer: COMMERCIAL

## 2018-06-26 VITALS
BODY MASS INDEX: 24.17 KG/M2 | SYSTOLIC BLOOD PRESSURE: 132 MMHG | WEIGHT: 154 LBS | HEIGHT: 67 IN | TEMPERATURE: 98.6 F | OXYGEN SATURATION: 98 % | RESPIRATION RATE: 18 BRPM | DIASTOLIC BLOOD PRESSURE: 71 MMHG | HEART RATE: 72 BPM

## 2018-06-26 DIAGNOSIS — N39.0 URINARY TRACT INFECTION WITHOUT HEMATURIA, SITE UNSPECIFIED: ICD-10-CM

## 2018-06-26 DIAGNOSIS — R30.0 DYSURIA: Primary | ICD-10-CM

## 2018-06-26 LAB
SL AMB  POCT GLUCOSE, UA: NORMAL
SL AMB LEUKOCYTE ESTERASE,UA: NORMAL
SL AMB POCT BILIRUBIN,UA: NORMAL
SL AMB POCT BLOOD,UA: NORMAL
SL AMB POCT CLARITY,UA: NORMAL
SL AMB POCT COLOR,UA: NORMAL
SL AMB POCT KETONES,UA: NORMAL
SL AMB POCT NITRITE,UA: NORMAL
SL AMB POCT PH,UA: 6.5
SL AMB POCT SPECIFIC GRAVITY,UA: 1.01
SL AMB POCT URINE PROTEIN: NORMAL
SL AMB POCT UROBILINOGEN: 0.2

## 2018-06-26 PROCEDURE — 87186 SC STD MICRODIL/AGAR DIL: CPT | Performed by: NURSE PRACTITIONER

## 2018-06-26 PROCEDURE — 87077 CULTURE AEROBIC IDENTIFY: CPT | Performed by: NURSE PRACTITIONER

## 2018-06-26 PROCEDURE — 99283 EMERGENCY DEPT VISIT LOW MDM: CPT | Performed by: NURSE PRACTITIONER

## 2018-06-26 PROCEDURE — 87086 URINE CULTURE/COLONY COUNT: CPT | Performed by: NURSE PRACTITIONER

## 2018-06-26 PROCEDURE — G0382 LEV 3 HOSP TYPE B ED VISIT: HCPCS | Performed by: NURSE PRACTITIONER

## 2018-06-26 RX ORDER — SULFAMETHOXAZOLE AND TRIMETHOPRIM 800; 160 MG/1; MG/1
1 TABLET ORAL EVERY 12 HOURS SCHEDULED
Qty: 14 TABLET | Refills: 0 | Status: SHIPPED | OUTPATIENT
Start: 2018-06-26 | End: 2018-06-28 | Stop reason: ALTCHOICE

## 2018-06-26 NOTE — PATIENT INSTRUCTIONS
Catheter-associated Urinary Tract Infection   AMBULATORY CARE:   A catheter-associated urinary tract infection (CAUTI)  is an infection caused by an indwelling urinary catheter  An indwelling urinary catheter is a thin, flexible tube that is inserted into the bladder  It is left in place to drain urine  The infection may travel along the catheter and into the bladder or kidneys  Common symptoms include the following:  A CAUTI may not cause symptoms or you may have any of the following:  · Fever and chills    · Pain in your lower abdomen or back    · Pus from the area where the catheter is inserted, or pus in your urine     · Bad-smelling, cloudy, bloody, or dark urine     · Burning during urination or frequent urination after the catheter is removed     · Sudden, strong need to urinate    · Confusion in older adults  Seek care immediately if:   · You have severe pain in your lower back or abdomen  · You have blood in your urine  · You stop urinating, or you urinate much less than usual   Contact your healthcare provider if:   · You have a fever  · Your symptoms do not improve or get worse  · You have questions or concerns about your condition or care  Treatment for CAUTI  may include medicine to treat an infection or decrease pain or fever  Your urinary catheter may be removed or changed to help get rid of the infection  Self-care:   · Drink fluids as directed  Fluids may help your kidneys and bladder get rid of the infection  · Keep the catheter area clean  Clean your skin around the catheter as directed  Shower once a day  Do not take baths or go in hot tubs until your infection is gone  · Do not have sex  until your healthcare provider says it is okay  Sex may delay healing or cause another UTI  Prevent another CAUTI:   · Wash your hands before and after you use the bathroom or touch the catheter  Wash your hands to prevent the spread of infection to your urinary tract       · Clean all parts of your catheter as directed  Keep your catheter tubing clean  Do not place the catheter on the ground  Do not allow the drainage spout to touch the toilet  Use an alcohol swab to clean the end of drainage spout as directed  · Keep the drainage bag below your waist   This may prevent urine from moving back into your bladder, which can cause an infection  · Empty the urine bag as directed  This may prevent urine from flowing back into your bladder  · Women should wipe front to back  after a bowel movement  This may prevent germs from getting into the urinary tract  · Keep the catheter secured to your leg as directed  Use tape or a special catheter tijerina to prevent your catheter from being pulled  This may also prevent kinks that could cause the urine to move back into the bladder  Follow up with your healthcare provider as directed:  Write down your questions so you remember to ask them during your visits  © 2017 [employer] Information is for End User's use only and may not be sold, redistributed or otherwise used for commercial purposes  All illustrations and images included in CareNotes® are the copyrighted property of [employer] M [employer] Inc  or [employer]uz  The above information is an  only  It is not intended as medical advice for individual conditions or treatments  Talk to your doctor, nurse or pharmacist before following any medical regimen to see if it is safe and effective for you

## 2018-06-26 NOTE — PROGRESS NOTES
[address] [facility]        NAME: Theo Self is a 32 y o  female  : 1990    MRN: [id number]  DATE: 2018  TIME: 8:47 AM    Assessment and Plan   Dysuria [R30 0]  1  Dysuria  POCT urine dip    Urine culture   2  Urinary tract infection without hematuria, site unspecified  sulfamethoxazole-trimethoprim (BACTRIM DS) 800-160 mg per tablet         Patient Instructions       Follow up with PCP in 3-5 days  Proceed to  ER if symptoms worsen  Chief Complaint     Chief Complaint   Patient presents with    Abdominal Pain    Difficulty Urinating     c/o blood in urine x 5 days   Leonel Suarez LPN         History of Present Illness       51-year-old female presents to urgent care with chief complaint of urinary frequency, urinary urgency and dysuria for the past 5-7 days  She has not used any over-the-counter medication she does have some complaints of lower abdominal tenderness denies any flank pain  Abdominal Pain   This is a new problem  The current episode started in the past 7 days  The onset quality is sudden  The problem occurs constantly  The problem has been unchanged  The pain is located in the suprapubic region  The pain is mild  The quality of the pain is burning  The abdominal pain does not radiate  Associated symptoms include dysuria and frequency  Pertinent negatives include no anorexia, arthralgias, belching, constipation, diarrhea, fever, flatus, headaches, hematochezia, hematuria, melena, myalgias, nausea, vomiting or weight loss  Nothing aggravates the pain  The pain is relieved by nothing  She has tried nothing for the symptoms  The treatment provided no relief  Review of Systems   Review of Systems   Constitutional: Negative  Negative for fever and weight loss  HENT: Negative  Eyes: Negative  Respiratory: Negative  Cardiovascular: Negative  Gastrointestinal: Positive for abdominal pain   Negative for abdominal distention, anal bleeding, anorexia, blood in stool, constipation, diarrhea, flatus, hematochezia, melena, nausea, rectal pain and vomiting  Endocrine: Negative  Genitourinary: Positive for dysuria, frequency and urgency  Negative for decreased urine volume, difficulty urinating, dyspareunia, enuresis, flank pain, genital sores, hematuria, menstrual problem, pelvic pain, vaginal bleeding, vaginal discharge and vaginal pain  Musculoskeletal: Negative  Negative for arthralgias and myalgias  Skin: Negative  Allergic/Immunologic: Negative  Neurological: Negative  Negative for headaches  Hematological: Negative  Psychiatric/Behavioral: Negative  All other systems reviewed and are negative  Current Medications       Current Outpatient Prescriptions:     Multiple Vitamins-Minerals (MULTIVITAL) tablet, Take 1 tablet by mouth daily, Disp: , Rfl:     sulfamethoxazole-trimethoprim (BACTRIM DS) 800-160 mg per tablet, Take 1 tablet by mouth every 12 (twelve) hours for 7 days, Disp: 14 tablet, Rfl: 0    Current Allergies     Allergies as of 06/26/2018    (No Known Allergies)            The following portions of the patient's history were reviewed and updated as appropriate: allergies, current medications, past family history, past medical history, past social history, past surgical history and problem list      Past Medical History:   Diagnosis Date    Anxiety     Anxiety and depression     Last Assessed: 4/17/2017    Depression     No meds currently  Last Assessed: 9/24/2015    Dysuria     Last Assessed: 8/1/2016    Endometriosis     Last Assessed: 1/23/2015    Fatigue     Last Assessed: 9/24/2015    Granulation tissue at vaginal vault     Last Assessed: 2/6/2017    Irregular menses     Migraines     Pelvic pain in female     Left pelvic area      Trichomoniasis     Resolved: 5/18/2017     Vaginal spotting     Last Assessed: 1/30/2017       Past Surgical History:   Procedure Laterality Date    APPENDECTOMY  10/06/2016    HYSTERECTOMY      LAPAROSCOPIC ENDOMETRIOSIS FULGURATION  2012    PELVIC LAPAROSCOPY      MI LAP,APPENDECTOMY N/A 10/6/2016    Procedure: APPENDECTOMY LAPAROSCOPIC;  Surgeon: Saroj Dowd MD;  Location: [facility];  Service: General    MI LAPAROSCOPY W TOT HYSTERECTUTERUS <=250 Cordie Hait  W TUBE/OVARY N/A 7/25/2016    Procedure: HYSTERECTOMY LAPAROSCOPIC TOTAL  LSO  right salpingectomy  diagnostic cystoscopy;  Surgeon: Tevin Rivera MD;  Location: [facility];  Service: Gynecology    TUBAL LIGATION  03/2015    VAGINAL HYSTERECTOMY      Last Assessed: 9/16/2016       Family History   Problem Relation Age of Onset    Thyroid disease Mother     Sleep apnea Mother     Parkinsonism Paternal Grandfather          Medications have been verified  Objective   /71 (BP Location: Right arm, Patient Position: Sitting, Cuff Size: Standard)   Pulse 72   Temp 98 6 °F (37 °C) (Tympanic)   Resp 18   Ht 5' 7" (1 702 m)   Wt 69 9 kg (154 lb)   LMP  (LMP Unknown) Comment: uhcg negative  SpO2 98%   BMI 24 12 kg/m²        Physical Exam     Physical Exam   Constitutional: She appears well-developed and well-nourished  HENT:   Head: Normocephalic and atraumatic  Right Ear: External ear normal    Left Ear: External ear normal    Nose: Nose normal    Mouth/Throat: Oropharynx is clear and moist    Eyes: EOM are normal  Pupils are equal, round, and reactive to light  Neck: Normal range of motion  Cardiovascular: Normal rate, regular rhythm and normal heart sounds  Pulmonary/Chest: Effort normal and breath sounds normal    Abdominal: Normal appearance and bowel sounds are normal  She exhibits no shifting dullness, no distension, no pulsatile liver, no fluid wave, no abdominal bruit, no ascites, no pulsatile midline mass and no mass  There is tenderness in the suprapubic area  There is no rigidity, no rebound, no guarding, no CVA tenderness, no tenderness at McBurney's point and negative Arevalo's sign  Nursing note and vitals reviewed

## 2018-06-28 LAB
BACTERIA UR CULT: ABNORMAL
BACTERIA UR CULT: ABNORMAL

## 2018-06-28 RX ORDER — NITROFURANTOIN 25; 75 MG/1; MG/1
100 CAPSULE ORAL 2 TIMES DAILY
Qty: 14 CAPSULE | Refills: 0 | Status: SHIPPED | OUTPATIENT
Start: 2018-06-28 | End: 2018-07-05

## 2018-09-08 ENCOUNTER — OFFICE VISIT (OUTPATIENT)
Dept: FAMILY MEDICINE CLINIC | Facility: CLINIC | Age: 28
End: 2018-09-08
Payer: COMMERCIAL

## 2018-09-08 VITALS
HEIGHT: 67 IN | BODY MASS INDEX: 23.35 KG/M2 | SYSTOLIC BLOOD PRESSURE: 124 MMHG | WEIGHT: 148.8 LBS | DIASTOLIC BLOOD PRESSURE: 76 MMHG

## 2018-09-08 DIAGNOSIS — F32.A ANXIETY AND DEPRESSION: Primary | ICD-10-CM

## 2018-09-08 DIAGNOSIS — F41.9 ANXIETY AND DEPRESSION: Primary | ICD-10-CM

## 2018-09-08 DIAGNOSIS — Z23 FLU VACCINE NEED: ICD-10-CM

## 2018-09-08 PROCEDURE — 3008F BODY MASS INDEX DOCD: CPT | Performed by: PHYSICIAN ASSISTANT

## 2018-09-08 PROCEDURE — 90686 IIV4 VACC NO PRSV 0.5 ML IM: CPT | Performed by: PHYSICIAN ASSISTANT

## 2018-09-08 PROCEDURE — 90471 IMMUNIZATION ADMIN: CPT | Performed by: PHYSICIAN ASSISTANT

## 2018-09-08 PROCEDURE — 99214 OFFICE O/P EST MOD 30 MIN: CPT | Performed by: PHYSICIAN ASSISTANT

## 2018-09-08 RX ORDER — CITALOPRAM 20 MG/1
20 TABLET ORAL DAILY
Qty: 90 TABLET | Refills: 0 | Status: SHIPPED | OUTPATIENT
Start: 2018-09-08 | End: 2018-12-11 | Stop reason: SDUPTHER

## 2018-09-08 NOTE — PROGRESS NOTES
Assessment/Plan:       Diagnoses and all orders for this visit:    Anxiety and depression  -     citalopram (CeleXA) 20 mg tablet; Take 1 tablet (20 mg total) by mouth daily    Flu vaccine need  -     influenza vaccine, [id number], quadrivalent, 0 5 mL, preservative-free (SYRINGE, SINGLE-DOSE VIAL), for adult and pediatric patients 3 yr+ ([facility], [place]          Subjective:      Patient ID: Jacquie Camacho is a 32 y o  female  Depression   This is a recurrent problem  The current episode started more than 1 month ago  The problem occurs constantly  The problem has been gradually worsening  Associated symptoms include fatigue  Pertinent negatives include no abdominal pain, arthralgias, chest pain, chills, congestion, coughing, myalgias, nausea, rash or sore throat  Nothing aggravates the symptoms  She has tried nothing for the symptoms  Anxiety   Presents for follow-up visit  Symptoms include decreased concentration, depressed mood, excessive worry, irritability, nervous/anxious behavior and restlessness  Patient reports no chest pain, dizziness, nausea, palpitations, shortness of breath or suicidal ideas  Symptoms occur constantly  The severity of symptoms is moderate, interfering with daily activities and causing significant distress  The following portions of the patient's history were reviewed and updated as appropriate:   She  has a past medical history of Anxiety; Anxiety and depression; Depression; Dysuria; Endometriosis; Fatigue; Granulation tissue at vaginal vault; Irregular menses; Migraines; Pelvic pain in female; Trichomoniasis; and Vaginal spotting  She   Patient Active Problem List    Diagnosis Date Noted    Anxiety and depression 09/08/2018    Acute appendicitis with localized peritonitis 10/06/2016    Pelvic pain 07/25/2016     She  has a past surgical history that includes Laparoscopy; Tubal ligation (03/2015);  Hysterectomy; pr lap,appendectomy (N/A, 10/6/2016); pr laparoscopy w tot hysterectuterus <=250 gram  w tube/ovary (N/A, 7/25/2016); Appendectomy (10/06/2016); Laparoscopic endometriosis fulguration (2012); and Vaginal hysterectomy  Her family history includes Parkinsonism in her paternal grandfather; Sleep apnea in her mother; Thyroid disease in her mother  She  reports that she quit smoking about 4 years ago  Her smoking use included Cigarettes  She quit after 5 00 years of use  She has never used smokeless tobacco  She reports that she drinks alcohol  She reports that she does not use drugs  Current Outpatient Prescriptions   Medication Sig Dispense Refill    citalopram (CeleXA) 20 mg tablet Take 1 tablet (20 mg total) by mouth daily 90 tablet 0    Multiple Vitamins-Minerals (MULTIVITAL) tablet Take 1 tablet by mouth daily       No current facility-administered medications for this visit  Current Outpatient Prescriptions on File Prior to Visit   Medication Sig    Multiple Vitamins-Minerals (MULTIVITAL) tablet Take 1 tablet by mouth daily     No current facility-administered medications on file prior to visit  She has No Known Allergies       Review of Systems   Constitutional: Positive for fatigue and irritability  Negative for activity change, appetite change, chills and unexpected weight change  HENT: Negative for congestion, postnasal drip, rhinorrhea, sinus pressure and sore throat  Respiratory: Negative for cough, shortness of breath and wheezing  Cardiovascular: Negative for chest pain and palpitations  Gastrointestinal: Negative for abdominal pain, diarrhea and nausea  Musculoskeletal: Negative for arthralgias and myalgias  Skin: Negative for rash  Neurological: Negative for dizziness and light-headedness  Psychiatric/Behavioral: Positive for decreased concentration and depression  Negative for suicidal ideas  The patient is nervous/anxious            Objective:      /76   Ht 5' 7" (1 702 m)   Wt 67 5 kg (148 lb 12 8 oz)   LMP  (LMP Unknown) Comment: uhcg negative  BMI 23 31 kg/m²          Physical Exam   Constitutional: She is oriented to person, place, and time  She appears well-developed and well-nourished  No distress  HENT:   Head: Normocephalic and atraumatic  Right Ear: External ear normal    Left Ear: External ear normal    Mouth/Throat: Oropharynx is clear and moist  No oropharyngeal exudate  Eyes: Conjunctivae are normal  Right eye exhibits no discharge  Left eye exhibits no discharge  No scleral icterus  Neck: Neck supple  No thyromegaly present  Cardiovascular: Normal rate, regular rhythm and normal heart sounds  Pulmonary/Chest: Effort normal and breath sounds normal  No respiratory distress  She has no wheezes  Musculoskeletal: Normal range of motion  She exhibits no edema  Lymphadenopathy:     She has no cervical adenopathy  Neurological: She is alert and oriented to person, place, and time  Skin: Skin is warm and dry  No rash noted  She is not diaphoretic  No erythema  Psychiatric: Her behavior is normal  Judgment and thought content normal  Her mood appears anxious  Thought content is not paranoid and not delusional  She exhibits a depressed mood  She expresses no homicidal and no suicidal ideation  She expresses no suicidal plans and no homicidal plans  Vitals reviewed

## 2018-11-07 ENCOUNTER — APPOINTMENT (OUTPATIENT)
Dept: LAB | Facility: HOSPITAL | Age: 28
End: 2018-11-07
Payer: COMMERCIAL

## 2018-11-07 ENCOUNTER — TRANSCRIBE ORDERS (OUTPATIENT)
Dept: ADMINISTRATIVE | Facility: HOSPITAL | Age: 28
End: 2018-11-07

## 2018-11-07 ENCOUNTER — OFFICE VISIT (OUTPATIENT)
Dept: FAMILY MEDICINE CLINIC | Facility: CLINIC | Age: 28
End: 2018-11-07
Payer: COMMERCIAL

## 2018-11-07 VITALS
WEIGHT: 151.6 LBS | DIASTOLIC BLOOD PRESSURE: 64 MMHG | BODY MASS INDEX: 23.79 KG/M2 | SYSTOLIC BLOOD PRESSURE: 120 MMHG | HEIGHT: 67 IN

## 2018-11-07 DIAGNOSIS — R61 NIGHT SWEATS: ICD-10-CM

## 2018-11-07 DIAGNOSIS — R61 NIGHT SWEATS: Primary | ICD-10-CM

## 2018-11-07 LAB
ALBUMIN SERPL BCP-MCNC: 4.6 G/DL (ref 3.5–5.7)
ALP SERPL-CCNC: 38 U/L (ref 40–150)
ALT SERPL W P-5'-P-CCNC: 13 U/L (ref 7–52)
ANION GAP SERPL CALCULATED.3IONS-SCNC: 4 MMOL/L (ref 4–13)
AST SERPL W P-5'-P-CCNC: 12 U/L (ref 13–39)
BASOPHILS # BLD AUTO: 0 THOUSANDS/ΜL (ref 0–0.1)
BASOPHILS NFR BLD AUTO: 1 % (ref 0–2)
BILIRUB SERPL-MCNC: 0.3 MG/DL (ref 0.2–1)
BUN SERPL-MCNC: 18 MG/DL (ref 7–25)
CALCIUM SERPL-MCNC: 9.4 MG/DL (ref 8.6–10.5)
CHLORIDE SERPL-SCNC: 105 MMOL/L (ref 98–107)
CO2 SERPL-SCNC: 29 MMOL/L (ref 21–31)
CREAT SERPL-MCNC: 0.74 MG/DL (ref 0.6–1.2)
EOSINOPHIL # BLD AUTO: 0.1 THOUSAND/ΜL (ref 0–0.61)
EOSINOPHIL NFR BLD AUTO: 1 % (ref 0–5)
ERYTHROCYTE [DISTWIDTH] IN BLOOD BY AUTOMATED COUNT: 12.5 % (ref 11.5–14.5)
GFR SERPL CREATININE-BSD FRML MDRD: 111 ML/MIN/1.73SQ M
GLUCOSE P FAST SERPL-MCNC: 85 MG/DL (ref 65–99)
HCT VFR BLD AUTO: 37.5 % (ref 34.8–46.1)
HGB BLD-MCNC: 12.9 G/DL (ref 12–16)
LYMPHOCYTES # BLD AUTO: 2.3 THOUSANDS/ΜL (ref 0.6–4.47)
LYMPHOCYTES NFR BLD AUTO: 36 % (ref 21–51)
MCH RBC QN AUTO: 31.8 PG (ref 26–34)
MCHC RBC AUTO-ENTMCNC: 34.4 G/DL (ref 31–37)
MCV RBC AUTO: 92 FL (ref 81–99)
MONOCYTES # BLD AUTO: 0.5 THOUSAND/ΜL (ref 0.17–1.22)
MONOCYTES NFR BLD AUTO: 8 % (ref 2–12)
NEUTROPHILS # BLD AUTO: 3.5 THOUSANDS/ΜL (ref 1.4–6.5)
NEUTS SEG NFR BLD AUTO: 55 % (ref 42–75)
NRBC BLD AUTO-RTO: 0 /100 WBCS
PLATELET # BLD AUTO: 181 THOUSANDS/UL (ref 149–390)
PMV BLD AUTO: 10.3 FL (ref 8.6–11.7)
POTASSIUM SERPL-SCNC: 3.9 MMOL/L (ref 3.5–5.5)
PROT SERPL-MCNC: 7 G/DL (ref 6.4–8.9)
RBC # BLD AUTO: 4.06 MILLION/UL (ref 3.9–5.2)
SODIUM SERPL-SCNC: 138 MMOL/L (ref 134–143)
T4 FREE SERPL-MCNC: 0.93 NG/DL (ref 0.76–1.46)
TSH SERPL DL<=0.05 MIU/L-ACNC: 1.84 UIU/ML (ref 0.45–5.33)
WBC # BLD AUTO: 6.3 THOUSAND/UL (ref 4.8–10.8)

## 2018-11-07 PROCEDURE — 99213 OFFICE O/P EST LOW 20 MIN: CPT | Performed by: PHYSICIAN ASSISTANT

## 2018-11-07 PROCEDURE — 36415 COLL VENOUS BLD VENIPUNCTURE: CPT

## 2018-11-07 PROCEDURE — 84443 ASSAY THYROID STIM HORMONE: CPT

## 2018-11-07 PROCEDURE — 84439 ASSAY OF FREE THYROXINE: CPT

## 2018-11-07 PROCEDURE — 80053 COMPREHEN METABOLIC PANEL: CPT

## 2018-11-07 PROCEDURE — 3008F BODY MASS INDEX DOCD: CPT | Performed by: PHYSICIAN ASSISTANT

## 2018-11-07 PROCEDURE — 85025 COMPLETE CBC W/AUTO DIFF WBC: CPT

## 2018-11-07 PROCEDURE — 1036F TOBACCO NON-USER: CPT | Performed by: PHYSICIAN ASSISTANT

## 2018-11-07 NOTE — PROGRESS NOTES
Assessment/Plan:    Will check labs on [place]  If negative, she will follow up with GYN  Diagnoses and all orders for this visit:    Night sweats  -     Comprehensive metabolic panel; Future  -     CBC and differential; Future  -     TSH, 3rd generation with Free T4 reflex; Future          Subjective:      Patient ID: Theola Bloch is a 32 y o  female  Chinyere Colón is here today complaining of new onset night sweats over the past month  This occurs 2-3x/ week  She sweats to the point of soaking through her clothing  Symptoms only occur at night  She denies fevers/chills, denies unintentional weight loss  Chinyere Colón is status post partial hysterectomy and has 1 ovary remaining and has an appointment in late December with her GYN  The following portions of the patient's history were reviewed and updated as appropriate:   She  has a past medical history of Anxiety; Anxiety and depression; Depression; Dysuria; Endometriosis; Fatigue; Granulation tissue at vaginal vault; Irregular menses; Migraines; Pelvic pain in female; Trichomoniasis; and Vaginal spotting  She   Patient Active Problem List    Diagnosis Date Noted    Anxiety and depression 09/08/2018    Acute appendicitis with localized peritonitis 10/06/2016    Pelvic pain 07/25/2016     She  has a past surgical history that includes Laparoscopy; Tubal ligation (03/2015); Hysterectomy; pr lap,appendectomy (N/A, 10/6/2016); pr laparoscopy w tot hysterectuterus <=250 gram  w tube/ovary (N/A, 7/25/2016); Appendectomy (10/06/2016); Laparoscopic endometriosis fulguration (2012); and Vaginal hysterectomy  Her family history includes Parkinsonism in her paternal grandfather; Sleep apnea in her mother; Thyroid disease in her mother  She  reports that she quit smoking about 4 years ago  Her smoking use included Cigarettes  She quit after 5 00 years of use  She has never used smokeless tobacco  She reports that she drinks alcohol   She reports that she does not use drugs   Current Outpatient Prescriptions   Medication Sig Dispense Refill    citalopram (CeleXA) 20 mg tablet Take 1 tablet (20 mg total) by mouth daily 90 tablet 0    Multiple Vitamins-Minerals (MULTIVITAL) tablet Take 1 tablet by mouth daily       No current facility-administered medications for this visit  Current Outpatient Prescriptions on File Prior to Visit   Medication Sig    citalopram (CeleXA) 20 mg tablet Take 1 tablet (20 mg total) by mouth daily    Multiple Vitamins-Minerals (MULTIVITAL) tablet Take 1 tablet by mouth daily     No current facility-administered medications on file prior to visit  She has No Known Allergies       Review of Systems   Constitutional: Negative for activity change, appetite change, chills, diaphoresis, fatigue, fever and unexpected weight change  HENT: Negative for congestion, ear pain, postnasal drip, rhinorrhea, sinus pain, sinus pressure, sneezing, sore throat, tinnitus and voice change  Eyes: Negative for pain, redness and visual disturbance  Respiratory: Negative for cough, chest tightness, shortness of breath and wheezing  Cardiovascular: Negative for chest pain, palpitations and leg swelling  Gastrointestinal: Negative for abdominal pain, blood in stool, constipation, diarrhea, nausea and vomiting  Genitourinary: Negative for difficulty urinating, dysuria, frequency, hematuria and urgency  Musculoskeletal: Negative for arthralgias, back pain, gait problem, joint swelling, myalgias, neck pain and neck stiffness  Skin: Negative for color change, pallor, rash and wound  Neurological: Negative for dizziness, tremors, weakness, light-headedness and headaches  Psychiatric/Behavioral: Negative for dysphoric mood, self-injury, sleep disturbance and suicidal ideas  The patient is not nervous/anxious            Objective:      /64   Ht 5' 7" (1 702 m)   Wt 68 8 kg (151 lb 9 6 oz)   LMP  (LMP Unknown) Comment: [facility] negative  BMI 23 74 kg/m²          Physical Exam   Constitutional: She is oriented to person, place, and time  She appears well-developed and well-nourished  No distress  HENT:   Head: Normocephalic and atraumatic  Right Ear: Hearing, tympanic membrane, external ear and ear canal normal    Left Ear: Hearing, tympanic membrane, external ear and ear canal normal    Mouth/Throat: Uvula is midline, oropharynx is clear and moist and mucous membranes are normal  No oropharyngeal exudate  Eyes: Conjunctivae are normal  Right eye exhibits no discharge  Left eye exhibits no discharge  No scleral icterus  Neck: Neck supple  Carotid bruit is not present  No thyromegaly present  Cardiovascular: Normal rate, regular rhythm and normal heart sounds  No murmur heard  Pulmonary/Chest: Effort normal and breath sounds normal  No respiratory distress  She has no wheezes  Abdominal: Soft  Bowel sounds are normal  She exhibits no distension and no mass  There is no tenderness  There is no rebound and no guarding  Musculoskeletal: Normal range of motion  She exhibits no edema or tenderness  Lymphadenopathy:     She has no cervical adenopathy  Neurological: She is alert and oriented to person, place, and time  Skin: Skin is warm and dry  No rash noted  She is not diaphoretic  No erythema  Psychiatric: She has a normal mood and affect  Her behavior is normal  Judgment and thought content normal    Vitals reviewed

## 2018-12-11 DIAGNOSIS — F41.9 ANXIETY AND DEPRESSION: ICD-10-CM

## 2018-12-11 DIAGNOSIS — F32.A ANXIETY AND DEPRESSION: ICD-10-CM

## 2018-12-11 RX ORDER — CITALOPRAM 20 MG/1
20 TABLET ORAL DAILY
Qty: 90 TABLET | Refills: 0 | Status: SHIPPED | OUTPATIENT
Start: 2018-12-11 | End: 2019-03-11 | Stop reason: SDUPTHER

## 2018-12-17 ENCOUNTER — OFFICE VISIT (OUTPATIENT)
Dept: FAMILY MEDICINE CLINIC | Facility: CLINIC | Age: 28
End: 2018-12-17
Payer: COMMERCIAL

## 2018-12-17 VITALS
SYSTOLIC BLOOD PRESSURE: 108 MMHG | HEIGHT: 67 IN | DIASTOLIC BLOOD PRESSURE: 68 MMHG | BODY MASS INDEX: 23.54 KG/M2 | WEIGHT: 150 LBS

## 2018-12-17 DIAGNOSIS — F41.9 ANXIETY AND DEPRESSION: ICD-10-CM

## 2018-12-17 DIAGNOSIS — L03.90 CELLULITIS, UNSPECIFIED CELLULITIS SITE: Primary | ICD-10-CM

## 2018-12-17 DIAGNOSIS — F32.A ANXIETY AND DEPRESSION: ICD-10-CM

## 2018-12-17 DIAGNOSIS — R61 NIGHT SWEATS: ICD-10-CM

## 2018-12-17 PROCEDURE — 1036F TOBACCO NON-USER: CPT | Performed by: PHYSICIAN ASSISTANT

## 2018-12-17 PROCEDURE — 3008F BODY MASS INDEX DOCD: CPT | Performed by: PHYSICIAN ASSISTANT

## 2018-12-17 PROCEDURE — 99214 OFFICE O/P EST MOD 30 MIN: CPT | Performed by: PHYSICIAN ASSISTANT

## 2018-12-17 RX ORDER — AMOXICILLIN AND CLAVULANATE POTASSIUM 875; 125 MG/1; MG/1
1 TABLET, FILM COATED ORAL EVERY 12 HOURS SCHEDULED
Qty: 20 TABLET | Refills: 0 | Status: SHIPPED | OUTPATIENT
Start: 2018-12-17 | End: 2018-12-27

## 2018-12-18 NOTE — PROGRESS NOTES
Assessment/Plan:  Continue citalopram and recheck OV with me in 3 months  Will give Augmentin to treat cellulitis of thigh  If night sweats continue, patient will let me know and will refer to endocrinology  Pt will discuss with OBGYN later this month first      Diagnoses and all orders for this visit:    Cellulitis, unspecified cellulitis site  -     amoxicillin-clavulanate (AUGMENTIN) 875-125 mg per tablet; Take 1 tablet by mouth every 12 (twelve) hours for 10 days    Anxiety and depression    Night sweats          Subjective:      Patient ID: Katerin Amaya is a 29 y o  female  Adele Mildina is here today to follow up for anxiety and depression  She is stable on Citalopram and wishes to continue the medication  She also notes an improvement in her night sweats, though it does still happen  She is going to discuss it with her OBGYN at the end of the month and if still present at that time, would consider a referral to endocrinology  New today, she complains of an insect bite on her upper R lateral thigh x few days  The area is painful/itchy and is slowly increasing in erythema  Denies fevers/chills  The following portions of the patient's history were reviewed and updated as appropriate:   She  has a past medical history of Anxiety; Anxiety and depression; Depression; Dysuria; Endometriosis; Fatigue; Granulation tissue at vaginal vault; Irregular menses; Migraines; Pelvic pain in female; Trichomoniasis; and Vaginal spotting  She   Patient Active Problem List    Diagnosis Date Noted    Night sweats 12/17/2018    Anxiety and depression 09/08/2018    Acute appendicitis with localized peritonitis 10/06/2016    Pelvic pain 07/25/2016     She  has a past surgical history that includes Laparoscopy; Tubal ligation (03/2015); Hysterectomy; pr lap,appendectomy (N/A, 10/6/2016); pr laparoscopy w tot hysterectuterus <=250 gram  w tube/ovary (N/A, 7/25/2016); Appendectomy (10/06/2016);  Laparoscopic endometriosis fulguration (2012); and Vaginal hysterectomy  Her family history includes Parkinsonism in her paternal grandfather; Sleep apnea in her mother; Thyroid disease in her mother  She  reports that she quit smoking about 5 years ago  Her smoking use included Cigarettes  She quit after 5 00 years of use  She has never used smokeless tobacco  She reports that she drinks alcohol  She reports that she does not use drugs  Current Outpatient Prescriptions   Medication Sig Dispense Refill    citalopram (CeleXA) 20 mg tablet Take 1 tablet (20 mg total) by mouth daily 90 tablet 0    Multiple Vitamins-Minerals (MULTIVITAL) tablet Take 1 tablet by mouth daily      amoxicillin-clavulanate (AUGMENTIN) 875-125 mg per tablet Take 1 tablet by mouth every 12 (twelve) hours for 10 days 20 tablet 0     No current facility-administered medications for this visit  Current Outpatient Prescriptions on File Prior to Visit   Medication Sig    citalopram (CeleXA) 20 mg tablet Take 1 tablet (20 mg total) by mouth daily    Multiple Vitamins-Minerals (MULTIVITAL) tablet Take 1 tablet by mouth daily     No current facility-administered medications on file prior to visit  She has No Known Allergies       Review of Systems   Constitutional: Negative for activity change, appetite change, chills, diaphoresis, fatigue, fever and unexpected weight change  HENT: Negative for congestion, ear pain, postnasal drip, rhinorrhea, sinus pain, sinus pressure, sneezing, sore throat, tinnitus and voice change  Eyes: Negative for pain, redness and visual disturbance  Respiratory: Negative for cough, chest tightness, shortness of breath and wheezing  Cardiovascular: Negative for chest pain, palpitations and leg swelling  Gastrointestinal: Negative for abdominal pain, blood in stool, constipation, diarrhea, nausea and vomiting  Genitourinary: Negative for difficulty urinating, dysuria, frequency, hematuria and urgency     Musculoskeletal: Negative for arthralgias, back pain, gait problem, joint swelling, myalgias, neck pain and neck stiffness  Skin: Positive for rash  Negative for color change, pallor and wound  Neurological: Negative for dizziness, tremors, weakness, light-headedness and headaches  Psychiatric/Behavioral: Negative for dysphoric mood, self-injury, sleep disturbance and suicidal ideas  The patient is not nervous/anxious  Objective:      /68 (BP Location: Left arm, Patient Position: Sitting)   Ht 5' 7" (1 702 m)   Wt 68 kg (150 lb)   LMP  (LMP Unknown) Comment: uhcg negative  BMI 23 49 kg/m²          Physical Exam   Constitutional: She is oriented to person, place, and time  She appears well-developed and well-nourished  No distress  HENT:   Head: Normocephalic and atraumatic  Right Ear: Hearing, tympanic membrane, external ear and ear canal normal    Left Ear: Hearing, tympanic membrane, external ear and ear canal normal    Mouth/Throat: Uvula is midline, oropharynx is clear and moist and mucous membranes are normal  No oropharyngeal exudate  Eyes: Conjunctivae are normal  Right eye exhibits no discharge  Left eye exhibits no discharge  No scleral icterus  Neck: Neck supple  Carotid bruit is not present  No thyromegaly present  Cardiovascular: Normal rate, regular rhythm and normal heart sounds  No murmur heard  Pulmonary/Chest: Effort normal and breath sounds normal  No respiratory distress  She has no wheezes  Abdominal: Soft  Bowel sounds are normal  She exhibits no distension and no mass  There is no tenderness  There is no rebound and no guarding  Musculoskeletal: Normal range of motion  She exhibits no edema or tenderness  Lymphadenopathy:     She has no cervical adenopathy  Neurological: She is alert and oriented to person, place, and time  Skin: Skin is warm and dry  Rash (R lateral upper thigh with half dollar sized round area of erythema, not raised) noted   She is not diaphoretic  No erythema  Psychiatric: She has a normal mood and affect  Her behavior is normal  Judgment and thought content normal    Vitals reviewed

## 2019-02-04 ENCOUNTER — OFFICE VISIT (OUTPATIENT)
Dept: URGENT CARE | Facility: CLINIC | Age: 29
End: 2019-02-04
Payer: COMMERCIAL

## 2019-02-04 VITALS
TEMPERATURE: 98.3 F | HEART RATE: 85 BPM | WEIGHT: 155 LBS | BODY MASS INDEX: 24.33 KG/M2 | OXYGEN SATURATION: 99 % | HEIGHT: 67 IN | DIASTOLIC BLOOD PRESSURE: 60 MMHG | RESPIRATION RATE: 20 BRPM | SYSTOLIC BLOOD PRESSURE: 100 MMHG

## 2019-02-04 DIAGNOSIS — J01.90 ACUTE NON-RECURRENT SINUSITIS, UNSPECIFIED LOCATION: Primary | ICD-10-CM

## 2019-02-04 PROCEDURE — G0382 LEV 3 HOSP TYPE B ED VISIT: HCPCS | Performed by: PHYSICIAN ASSISTANT

## 2019-02-04 PROCEDURE — 99283 EMERGENCY DEPT VISIT LOW MDM: CPT | Performed by: PHYSICIAN ASSISTANT

## 2019-02-04 PROCEDURE — 99213 OFFICE O/P EST LOW 20 MIN: CPT | Performed by: PHYSICIAN ASSISTANT

## 2019-02-04 RX ORDER — AMOXICILLIN AND CLAVULANATE POTASSIUM 875; 125 MG/1; MG/1
1 TABLET, FILM COATED ORAL EVERY 12 HOURS SCHEDULED
Qty: 14 TABLET | Refills: 0 | Status: SHIPPED | OUTPATIENT
Start: 2019-02-04 | End: 2019-02-11

## 2019-02-04 NOTE — PATIENT INSTRUCTIONS
Sinusitis   AMBULATORY CARE:   Sinusitis  is inflammation or infection of your sinuses  It is most often caused by a virus  Acute sinusitis may last up to 12 weeks  Chronic sinusitis lasts longer than 12 weeks  Recurrent sinusitis means you have 4 or more times in 1 year  Common symptoms include the following:   · Fever    · Pain, pressure, redness, or swelling around the forehead, cheeks, or eyes    · Thick yellow or green discharge from your nose    · Tenderness when you touch your face over your sinuses    · Dry cough that happens mostly at night or when you lie down    · Headache and face pain that is worse when you lean forward    · Tooth pain, or pain when you chew  Seek care immediately if:   · Your eye and eyelid are red, swollen, and painful  · You cannot open your eye  · You have vision changes, such as double vision  · Your eyeball bulges out or you cannot move your eye  · You are more sleepy than normal, or you notice changes in your ability to think, move, or talk  · You have a stiff neck, a fever, or a bad headache  · You have swelling of your forehead or scalp  Contact your healthcare provider if:   · Your symptoms do not improve after 3 days  · Your symptoms do not go away after 10 days  · You have nausea and are vomiting  · Your nose is bleeding  · You have questions or concerns about your condition or care  Treatment for sinusitis:  Your symptoms may go away on their own  Your healthcare provider may recommend watchful waiting for up to 10 days before starting antibiotics  You may  need any of the following:  · Acetaminophen  decreases pain and fever  It is available without a doctor's order  Ask how much to take and how often to take it  Follow directions  Read the labels of all other medicines you are using to see if they also contain acetaminophen, or ask your doctor or pharmacist  Acetaminophen can cause liver damage if not taken correctly   Do not use more than 4 grams (4,000 milligrams) total of acetaminophen in one day  · NSAIDs , such as ibuprofen, help decrease swelling, pain, and fever  This medicine is available with or without a doctor's order  NSAIDs can cause stomach bleeding or kidney problems in certain people  If you take blood thinner medicine, always ask your healthcare provider if NSAIDs are safe for you  Always read the medicine label and follow directions  · Nasal steroid sprays  may help decrease inflammation in your nose and sinuses  · Decongestants  help reduce swelling and drain mucus in the nose and sinuses  They may help you breathe easier  · Antihistamines  help dry mucus in the nose and relieve sneezing  · Antibiotics  help treat or prevent a bacterial infection  · Take your medicine as directed  Contact your healthcare provider if you think your medicine is not helping or if you have side effects  Tell him or her if you are allergic to any medicine  Keep a list of the medicines, vitamins, and herbs you take  Include the amounts, and when and why you take them  Bring the list or the pill bottles to follow-up visits  Carry your medicine list with you in case of an emergency  Self-care:   · Rinse your sinuses  Use a sinus rinse device to rinse your nasal passages with a saline (salt water) solution or distilled water  Do not use tap water  This will help thin the mucus in your nose and rinse away pollen and dirt  It will also help reduce swelling so you can breathe normally  Ask your healthcare provider how often to do this  · Breathe in steam   Heat a bowl of water until you see steam  Lean over the bowl and make a tent over your head with a large towel  Breathe deeply for about 20 minutes  Be careful not to get too close to the steam or burn yourself  Do this 3 times a day  You can also breathe deeply when you take a hot shower  · Sleep with your head elevated    Place an extra pillow under your head before you go to sleep to help your sinuses drain  · Drink liquids as directed  Ask your healthcare provider how much liquid to drink each day and which liquids are best for you  Liquids will thin the mucus in your nose and help it drain  Avoid drinks that contain alcohol or caffeine  · Do not smoke, and avoid secondhand smoke  Nicotine and other chemicals in cigarettes and cigars can make your symptoms worse  Ask your healthcare provider for information if you currently smoke and need help to quit  E-cigarettes or smokeless tobacco still contain nicotine  Talk to your healthcare provider before you use these products  Prevent the spread of germs that cause sinusitis:  Wash your hands often with soap and water  Wash your hands after you use the bathroom, change a child's diaper, or sneeze  Wash your hands before you prepare or eat food  Follow up with your healthcare provider as directed: You may be referred to an ear, nose, and throat specialist  Write down your questions so you remember to ask them during your visits  © 2017 [facility] Information is for End User's use only and may not be sold, redistributed or otherwise used for commercial purposes  All illustrations and images included in CareNotes® are the copyrighted property of [employer] , [employer]  or [employer]  The above information is an  only  It is not intended as medical advice for individual conditions or treatments  Talk to your doctor, nurse or pharmacist before following any medical regimen to see if it is safe and effective for you

## 2019-02-04 NOTE — PROGRESS NOTES
[facility]        NAME: Steffanie Yancey is a 29 y o  female  : 1990    MRN: [id number]  DATE: 2019  TIME: 10:59 AM    Assessment and Plan   Acute non-recurrent sinusitis, unspecified location [J01 90]  1  Acute non-recurrent sinusitis, unspecified location  amoxicillin-clavulanate (AUGMENTIN) 875-125 mg per tablet         Patient Instructions       Follow up with PCP in 3-5 days  Proceed to  ER if symptoms worsen  Chief Complaint     Chief Complaint   Patient presents with    Sinusitis     cough,sinus and chest congestion for 1 day    Earache     bilateral ear pain for 1 day         History of Present Illness       Patient presents with a 2 day history of sinus congestion pressure pain purulent nasal drainage sore throat and cough  She denies any fever chills  She does have burning her chest with coughing  She denies any shortness of breath dyspnea on exertion  Review of Systems   Review of Systems   Constitutional: Negative for chills and fever  HENT: Positive for congestion, ear pain, postnasal drip, rhinorrhea, sinus pressure and sore throat  Negative for trouble swallowing  Eyes: Negative for redness  Respiratory: Positive for cough  Negative for chest tightness, shortness of breath and wheezing  Cardiovascular: Positive for chest pain (Burning in chest with coughing only)  Gastrointestinal: Negative for abdominal pain, diarrhea, nausea and vomiting  Musculoskeletal: Negative for myalgias  Skin: Negative for pallor  Neurological: Negative for dizziness and headaches  Hematological: Negative for adenopathy           Current Medications       Current Outpatient Prescriptions:     citalopram (CeleXA) 20 mg tablet, Take 1 tablet (20 mg total) by mouth daily, Disp: 90 tablet, Rfl: 0    amoxicillin-clavulanate (AUGMENTIN) 875-125 mg per tablet, Take 1 tablet by mouth every 12 (twelve) hours for 7 days Tell pt Zithromax was not prescribe secondary to Celexa interaction, Disp: 14 tablet, Rfl: 0    Multiple Vitamins-Minerals (MULTIVITAL) tablet, Take 1 tablet by mouth daily, Disp: , Rfl:     Current Allergies     Allergies as of 02/04/2019    (No Known Allergies)            The following portions of the patient's history were reviewed and updated as appropriate: allergies, current medications, past family history, past medical history, past social history, past surgical history and problem list      Past Medical History:   Diagnosis Date    Anxiety     Anxiety and depression     Last Assessed: 4/17/2017    Depression     No meds currently  Last Assessed: 9/24/2015    Dysuria     Last Assessed: 8/1/2016    Endometriosis     Last Assessed: 1/23/2015    Fatigue     Last Assessed: 9/24/2015    Granulation tissue at vaginal vault     Last Assessed: 2/6/2017    Irregular menses     Migraines     Pelvic pain in female     Left pelvic area   Trichomoniasis     Resolved: 5/18/2017     Vaginal spotting     Last Assessed: 1/30/2017       Past Surgical History:   Procedure Laterality Date    APPENDECTOMY  10/06/2016    HYSTERECTOMY      LAPAROSCOPIC ENDOMETRIOSIS FULGURATION  2012    PELVIC LAPAROSCOPY      AK LAP,APPENDECTOMY N/A 10/6/2016    Procedure: APPENDECTOMY LAPAROSCOPIC;  Surgeon: Aysha Odell MD;  Location: [facility];  Service: General    AK LAPAROSCOPY W TOT HYSTERECTUTERUS <=250 Wynema Fellers TUBE/OVARY N/A 7/25/2016    Procedure: HYSTERECTOMY LAPAROSCOPIC TOTAL  LSO  right salpingectomy  diagnostic cystoscopy;  Surgeon: Jamaal Segovia MD;  Location: [facility];  Service: Gynecology    TUBAL LIGATION  03/2015    VAGINAL HYSTERECTOMY      Last Assessed: 9/16/2016       Family History   Problem Relation Age of Onset    Thyroid disease Mother     Sleep apnea Mother     Parkinsonism Paternal Grandfather          Medications have been verified          Objective   /60   Pulse 85   Temp 98 3 °F (36 8 °C) (Tympanic)   Resp 20   Ht 5' 7" (1 702 m)   Wt 70 3 kg (155 lb)   LMP  (LMP Unknown) Comment: uhcg negative  SpO2 99%   BMI 24 28 kg/m²        Physical Exam     Physical Exam   Constitutional: She is oriented to person, place, and time  She appears well-developed and well-nourished  HENT:   Head: Normocephalic and atraumatic  Mild erythema bilateral TMs with decreased light reflex  Eyes: Conjunctivae are normal    Neck: Neck supple  Cardiovascular: Normal rate, regular rhythm and normal heart sounds  Pulmonary/Chest: Effort normal and breath sounds normal    Lymphadenopathy:     She has no cervical adenopathy  Neurological: She is alert and oriented to person, place, and time  Skin: Skin is warm and dry  No rash noted  Psychiatric: She has a normal mood and affect   Her behavior is normal  Judgment and thought content normal

## 2019-02-19 ENCOUNTER — OFFICE VISIT (OUTPATIENT)
Dept: FAMILY MEDICINE CLINIC | Facility: CLINIC | Age: 29
End: 2019-02-19
Payer: COMMERCIAL

## 2019-02-19 VITALS
OXYGEN SATURATION: 98 % | HEART RATE: 70 BPM | BODY MASS INDEX: 24.42 KG/M2 | DIASTOLIC BLOOD PRESSURE: 72 MMHG | SYSTOLIC BLOOD PRESSURE: 112 MMHG | WEIGHT: 155.6 LBS | TEMPERATURE: 98.6 F | HEIGHT: 67 IN

## 2019-02-19 DIAGNOSIS — J40 BRONCHITIS: Primary | ICD-10-CM

## 2019-02-19 PROCEDURE — 99213 OFFICE O/P EST LOW 20 MIN: CPT | Performed by: PHYSICIAN ASSISTANT

## 2019-02-19 PROCEDURE — 3008F BODY MASS INDEX DOCD: CPT | Performed by: PHYSICIAN ASSISTANT

## 2019-02-19 RX ORDER — BENZONATATE 100 MG/1
100 CAPSULE ORAL 3 TIMES DAILY PRN
Qty: 30 CAPSULE | Refills: 0 | Status: SHIPPED | OUTPATIENT
Start: 2019-02-19 | End: 2019-03-11

## 2019-02-19 NOTE — PROGRESS NOTES
Assessment/Plan:     Diagnoses and all orders for this visit:    Bronchitis  -     benzonatate (TESSALON PERLES) 100 mg capsule; Take 1 capsule (100 mg total) by mouth 3 (three) times a day as needed for cough        Prescription given for tessalon perles to aide with cough  Educated Mary Andrews that symptoms are most likely viral in nature and need to run its course  Continue to push fluids, symptomatic relief, and if symptom do not improve or worsen she will let us know  Subjective:      Patient ID: Polina Duran is a 29 y o  female  Mary Andrews is a 29year old female who presents with chest tightness, fatigue, cough, and left ear pain  She was seen at the Urgent Care on 2/4/19 and given Augmentin for a sinus infection and was told that she had the start of bronchitis  She finished the augmentin, which seemed to help with the sinus symptoms, but she still has a lingering cough  She has been trying Mucinex and Delsym which have provided mild relief  She denies any chest pains, wheezing, fevers, chills, body aches, or shortness of breath  The following portions of the patient's history were reviewed and updated as appropriate:   She  has a past medical history of Anxiety, Anxiety and depression, Depression, Dysuria, Endometriosis, Fatigue, Granulation tissue at vaginal vault, Irregular menses, Migraines, Pelvic pain in female, Trichomoniasis, and Vaginal spotting  She   Patient Active Problem List    Diagnosis Date Noted    Night sweats 12/17/2018    Anxiety and depression 09/08/2018    Acute appendicitis with localized peritonitis 10/06/2016    Pelvic pain 07/25/2016     She  has a past surgical history that includes Laparoscopy; Tubal ligation (03/2015); Hysterectomy; pr lap,appendectomy (N/A, 10/6/2016); pr laparoscopy w tot hysterectuterus <=250 gram  w tube/ovary (N/A, 7/25/2016); Appendectomy (10/06/2016); Laparoscopic endometriosis fulguration (2012); and Vaginal hysterectomy    Her family history includes Parkinsonism in her paternal grandfather; Sleep apnea in her mother; Thyroid disease in her mother  She  reports that she quit smoking about 5 years ago  Her smoking use included cigarettes  She quit after 5 00 years of use  She has never used smokeless tobacco  She reports that she drinks alcohol  She reports that she does not use drugs  Current Outpatient Medications   Medication Sig Dispense Refill    citalopram (CeleXA) 20 mg tablet Take 1 tablet (20 mg total) by mouth daily 90 tablet 0    Multiple Vitamins-Minerals (MULTIVITAL) tablet Take 1 tablet by mouth daily      benzonatate (TESSALON PERLES) 100 mg capsule Take 1 capsule (100 mg total) by mouth 3 (three) times a day as needed for cough 30 capsule 0     No current facility-administered medications for this visit  Current Outpatient Medications on File Prior to Visit   Medication Sig    citalopram (CeleXA) 20 mg tablet Take 1 tablet (20 mg total) by mouth daily    Multiple Vitamins-Minerals (MULTIVITAL) tablet Take 1 tablet by mouth daily     No current facility-administered medications on file prior to visit  She has No Known Allergies       Review of Systems   Constitutional: Positive for fatigue  Negative for chills, diaphoresis and fever  HENT: Positive for ear pain (left)  Negative for congestion, postnasal drip, rhinorrhea, sneezing, sore throat and trouble swallowing  Eyes: Negative for pain and visual disturbance  Respiratory: Positive for cough and chest tightness  Negative for apnea, shortness of breath and wheezing  Cardiovascular: Negative for chest pain and palpitations  Gastrointestinal: Negative for abdominal pain, constipation, diarrhea, nausea and vomiting  Genitourinary: Negative for dysuria and hematuria  Musculoskeletal: Negative for arthralgias, gait problem and myalgias  Neurological: Negative for dizziness, syncope, weakness, light-headedness, numbness and headaches     Psychiatric/Behavioral: Negative for suicidal ideas  The patient is not nervous/anxious  Objective:  /72 (BP Location: Left arm, Patient Position: Sitting)   Temp 98 6 °F (37 °C)   Ht 5' 7" (1 702 m)   Wt 70 6 kg (155 lb 9 6 oz)   LMP  (LMP Unknown) Comment: uhcg negative  BMI 24 37 kg/m²      Physical Exam   Constitutional: She is oriented to person, place, and time  She appears well-developed and well-nourished  HENT:   Head: Normocephalic and atraumatic  Right Ear: Tympanic membrane, external ear and ear canal normal    Left Ear: Tympanic membrane, external ear and ear canal normal    Nose: Mucosal edema present  No rhinorrhea  Mouth/Throat: Oropharynx is clear and moist and mucous membranes are normal  No oropharyngeal exudate, posterior oropharyngeal edema or posterior oropharyngeal erythema  Eyes: Pupils are equal, round, and reactive to light  EOM are normal    Neck: Normal range of motion  Neck supple  Cardiovascular: Normal rate, regular rhythm and normal heart sounds  Exam reveals no gallop and no friction rub  No murmur heard  Pulmonary/Chest: Effort normal and breath sounds normal  No respiratory distress  She has no wheezes  She has no rales  Musculoskeletal: Normal range of motion  Lymphadenopathy:     She has no cervical adenopathy  Neurological: She is alert and oriented to person, place, and time  Skin: Skin is warm and dry  Psychiatric: She has a normal mood and affect   Her behavior is normal  Judgment and thought content normal

## 2019-03-11 ENCOUNTER — OFFICE VISIT (OUTPATIENT)
Dept: FAMILY MEDICINE CLINIC | Facility: CLINIC | Age: 29
End: 2019-03-11
Payer: COMMERCIAL

## 2019-03-11 VITALS
DIASTOLIC BLOOD PRESSURE: 66 MMHG | BODY MASS INDEX: 24.64 KG/M2 | HEIGHT: 67 IN | WEIGHT: 157 LBS | SYSTOLIC BLOOD PRESSURE: 114 MMHG

## 2019-03-11 DIAGNOSIS — F32.A ANXIETY AND DEPRESSION: ICD-10-CM

## 2019-03-11 DIAGNOSIS — H65.93 BILATERAL SEROUS OTITIS MEDIA, UNSPECIFIED CHRONICITY: Primary | ICD-10-CM

## 2019-03-11 DIAGNOSIS — F41.9 ANXIETY AND DEPRESSION: ICD-10-CM

## 2019-03-11 PROCEDURE — 99214 OFFICE O/P EST MOD 30 MIN: CPT | Performed by: PHYSICIAN ASSISTANT

## 2019-03-11 PROCEDURE — 1036F TOBACCO NON-USER: CPT | Performed by: PHYSICIAN ASSISTANT

## 2019-03-11 RX ORDER — PREDNISONE 10 MG/1
TABLET ORAL
Qty: 20 TABLET | Refills: 0 | Status: SHIPPED | OUTPATIENT
Start: 2019-03-11 | End: 2019-03-23 | Stop reason: ALTCHOICE

## 2019-03-11 RX ORDER — CITALOPRAM 20 MG/1
20 TABLET ORAL DAILY
Qty: 90 TABLET | Refills: 0 | Status: SHIPPED | OUTPATIENT
Start: 2019-03-11 | End: 2019-06-17 | Stop reason: SDUPTHER

## 2019-03-11 RX ORDER — FLUTICASONE PROPIONATE 50 MCG
2 SPRAY, SUSPENSION (ML) NASAL DAILY
Qty: 1 BOTTLE | Refills: 0 | Status: SHIPPED | OUTPATIENT
Start: 2019-03-11 | End: 2019-06-17 | Stop reason: ALTCHOICE

## 2019-03-11 NOTE — PROGRESS NOTES
Assessment/Plan:    Will Rx Flonase and Prednisone taper to alleviate serous otitis media  Refill Citalopram  RTO 3 months for follow up or sooner PRN  Diagnoses and all orders for this visit:    Bilateral serous otitis media, unspecified chronicity  -     fluticasone (FLONASE) 50 mcg/act nasal spray; 2 sprays into each nostril daily  -     predniSONE 10 mg tablet; Days 1 and 2 take 4 tablets daily, days 3 and 4 take 3 tablets daily, days 5 and 6 and 2 tablets daily, days 7 and 8 take 1 tablet daily  Anxiety and depression  -     citalopram (CeleXA) 20 mg tablet; Take 1 tablet (20 mg total) by mouth daily          Subjective:      Patient ID: Bev Garner is a 29 y o  female  Giorgiojhoan Melva is here today for 3 month follow up for anxiety and depression  She is doing well and is stable on medication  Night sweats have resolved  Since her last OV, broke up with boyfriend and is now starting a new relationship with another man  She seems to have handled the transition well  Denies SI/HI  Also, is complaining of "fluid" in bilateral ears  Denies ear pain specifically, but has pressure  This has been present x few weeks after URI  Has tried Sudafed without relief  The following portions of the patient's history were reviewed and updated as appropriate:   She  has a past medical history of Anxiety, Anxiety and depression, Depression, Dysuria, Endometriosis, Fatigue, Granulation tissue at vaginal vault, Irregular menses, Migraines, Pelvic pain in female, Trichomoniasis, and Vaginal spotting  She   Patient Active Problem List    Diagnosis Date Noted    Anxiety and depression 09/08/2018     She  has a past surgical history that includes Laparoscopy; Tubal ligation (03/2015); Hysterectomy; pr lap,appendectomy (N/A, 10/6/2016); pr laparoscopy w tot hysterectuterus <=250 gram  w tube/ovary (N/A, 7/25/2016); Appendectomy (10/06/2016); Laparoscopic endometriosis fulguration (2012); and Vaginal hysterectomy    Her family history includes Parkinsonism in her paternal grandfather; Sleep apnea in her mother; Thyroid disease in her mother  She  reports that she quit smoking about 5 years ago  Her smoking use included cigarettes  She quit after 5 00 years of use  She has never used smokeless tobacco  She reports that she drinks alcohol  She reports that she does not use drugs  Current Outpatient Medications   Medication Sig Dispense Refill    citalopram (CeleXA) 20 mg tablet Take 1 tablet (20 mg total) by mouth daily 90 tablet 0    Multiple Vitamins-Minerals (MULTIVITAL) tablet Take 1 tablet by mouth daily      fluticasone (FLONASE) 50 mcg/act nasal spray 2 sprays into each nostril daily 1 Bottle 0    predniSONE 10 mg tablet Days 1 and 2 take 4 tablets daily, days 3 and 4 take 3 tablets daily, days 5 and 6 and 2 tablets daily, days 7 and 8 take 1 tablet daily  20 tablet 0     No current facility-administered medications for this visit  Current Outpatient Medications on File Prior to Visit   Medication Sig    Multiple Vitamins-Minerals (MULTIVITAL) tablet Take 1 tablet by mouth daily    [DISCONTINUED] citalopram (CeleXA) 20 mg tablet Take 1 tablet (20 mg total) by mouth daily    [DISCONTINUED] benzonatate (TESSALON PERLES) 100 mg capsule Take 1 capsule (100 mg total) by mouth 3 (three) times a day as needed for cough     No current facility-administered medications on file prior to visit  She has No Known Allergies       Review of Systems   Constitutional: Negative for activity change, appetite change, chills, diaphoresis, fatigue, fever and unexpected weight change  HENT: Negative for congestion, ear pain, postnasal drip, rhinorrhea, sinus pressure, sinus pain, sneezing, sore throat, tinnitus and voice change  Eyes: Negative for pain, redness and visual disturbance  Respiratory: Negative for cough, chest tightness, shortness of breath and wheezing      Cardiovascular: Negative for chest pain, palpitations and leg swelling  Gastrointestinal: Negative for abdominal pain, blood in stool, constipation, diarrhea, nausea and vomiting  Genitourinary: Negative for difficulty urinating, dysuria, frequency, hematuria and urgency  Musculoskeletal: Negative for arthralgias, back pain, gait problem, joint swelling, myalgias, neck pain and neck stiffness  Skin: Negative for color change, pallor, rash and wound  Neurological: Negative for dizziness, tremors, weakness, light-headedness and headaches  Psychiatric/Behavioral: Negative for dysphoric mood, self-injury, sleep disturbance and suicidal ideas  The patient is not nervous/anxious  Objective:      /66   Ht 5' 7" (1 702 m)   Wt 71 2 kg (157 lb)   LMP  (LMP Unknown) Comment: uhcg negative  BMI 24 59 kg/m²          Physical Exam   Constitutional: She is oriented to person, place, and time  She appears well-developed and well-nourished  No distress  HENT:   Head: Normocephalic and atraumatic  Right Ear: Hearing, tympanic membrane, external ear and ear canal normal    Left Ear: Hearing, tympanic membrane, external ear and ear canal normal    Mouth/Throat: Uvula is midline, oropharynx is clear and moist and mucous membranes are normal  No oropharyngeal exudate  Dulled light reflex bilaterally   Eyes: Conjunctivae are normal  Right eye exhibits no discharge  Left eye exhibits no discharge  No scleral icterus  Neck: Neck supple  Carotid bruit is not present  No thyromegaly present  Cardiovascular: Normal rate, regular rhythm and normal heart sounds  No murmur heard  Pulmonary/Chest: Effort normal and breath sounds normal  No respiratory distress  She has no wheezes  Abdominal: Soft  Bowel sounds are normal  She exhibits no distension and no mass  There is no tenderness  There is no rebound and no guarding  Musculoskeletal: Normal range of motion  She exhibits no edema or tenderness  Lymphadenopathy:     She has no cervical adenopathy  Neurological: She is alert and oriented to person, place, and time  Skin: Skin is warm and dry  No rash noted  She is not diaphoretic  No erythema  Psychiatric: She has a normal mood and affect  Her behavior is normal  Judgment and thought content normal    Vitals reviewed

## 2019-03-18 ENCOUNTER — TELEPHONE (OUTPATIENT)
Dept: FAMILY MEDICINE CLINIC | Facility: CLINIC | Age: 29
End: 2019-03-18

## 2019-03-18 DIAGNOSIS — H65.90 SEROUS OTITIS MEDIA, UNSPECIFIED CHRONICITY, UNSPECIFIED LATERALITY: Primary | ICD-10-CM

## 2019-03-18 NOTE — TELEPHONE ENCOUNTER
Patient said she would like to come in to make sure there is still fluid in her ear before she goes to ENT, will schedule apt

## 2019-03-21 ENCOUNTER — TELEPHONE (OUTPATIENT)
Dept: SLEEP CENTER | Facility: CLINIC | Age: 29
End: 2019-03-21

## 2019-03-23 ENCOUNTER — OFFICE VISIT (OUTPATIENT)
Dept: URGENT CARE | Facility: CLINIC | Age: 29
End: 2019-03-23
Payer: COMMERCIAL

## 2019-03-23 VITALS
TEMPERATURE: 98.1 F | RESPIRATION RATE: 16 BRPM | SYSTOLIC BLOOD PRESSURE: 121 MMHG | OXYGEN SATURATION: 99 % | DIASTOLIC BLOOD PRESSURE: 69 MMHG | HEART RATE: 68 BPM

## 2019-03-23 DIAGNOSIS — H69.93 DISORDER OF EUSTACHIAN TUBE, BILATERAL: ICD-10-CM

## 2019-03-23 DIAGNOSIS — H66.91 ACUTE OTITIS MEDIA, RIGHT: Primary | ICD-10-CM

## 2019-03-23 PROCEDURE — 99283 EMERGENCY DEPT VISIT LOW MDM: CPT | Performed by: PHYSICIAN ASSISTANT

## 2019-03-23 PROCEDURE — G0382 LEV 3 HOSP TYPE B ED VISIT: HCPCS | Performed by: PHYSICIAN ASSISTANT

## 2019-03-23 PROCEDURE — 99213 OFFICE O/P EST LOW 20 MIN: CPT | Performed by: PHYSICIAN ASSISTANT

## 2019-03-23 RX ORDER — CEFDINIR 300 MG/1
300 CAPSULE ORAL EVERY 12 HOURS SCHEDULED
Qty: 20 CAPSULE | Refills: 0 | Status: SHIPPED | OUTPATIENT
Start: 2019-03-23 | End: 2019-03-29

## 2019-03-23 NOTE — PROGRESS NOTES
[facility]        NAME: Luan Jessica is a 29 y o  female  : 1990    MRN: [id number]  DATE: 2019  TIME: 8:32 AM    Assessment and Plan   Acute otitis media, right [H66 91]  1  Acute otitis media, right  cefdinir (OMNICEF) 300 mg capsule   2  Disorder of Eustachian tube, bilateral  Ambulatory Referral to Otolaryngology         Patient Instructions     Patient Instructions   Take antibiotic twice daily for 10 days  Take complete course even if feeling better  Restart flonase 2 sprays per nostril once daily  Start Allegra once daily  Take ibuprofen 600 mg up to every 8 hours for pain  See ENT ASAP        Follow up with PCP in 3-5 days  Proceed to  ER if symptoms worsen  Chief Complaint     Chief Complaint   Patient presents with    Earache     bilateral for several weeks         History of Present Illness       60-year-old female presents complaining of bilateral ear pain right worse than left for several weeks  Reports originally was given azithromycin which offered minimal relief  Also was instructed to use Flonase 2 sprays per nostril daily, she has since stopped this  Recently seen by family doctor and given prednisone to help with eustachian tube dysfunction, reports relief while taking the medicine but after stopping symptoms returned  Was told referral will be placed to ENT but no action taken  Also reports being told to use Sudafed which also offered minimal relief  Denies cold symptoms, congestion, postnasal drip, fever, chills, change in hearing, tinnitus, dizziness      Review of Systems   Review of Systems   Constitutional: Negative for diaphoresis, fatigue and fever  HENT: Positive for ear pain  Negative for congestion, hearing loss, postnasal drip, rhinorrhea and sore throat  Eyes: Negative for pain, redness and visual disturbance  Respiratory: Negative for cough, shortness of breath and wheezing      Cardiovascular: Negative for chest pain, palpitations and leg swelling  Gastrointestinal: Negative for anal bleeding, constipation, diarrhea, nausea and vomiting  Endocrine: Negative for polydipsia and polyuria  Genitourinary: Negative for dysuria, flank pain and hematuria  Musculoskeletal: Negative for arthralgias, back pain, joint swelling and myalgias  Skin: Negative for pallor, rash and wound  Neurological: Negative for dizziness, syncope, numbness and headaches  Hematological: Negative for adenopathy  Does not bruise/bleed easily  Psychiatric/Behavioral: Negative for dysphoric mood and sleep disturbance  The patient is not nervous/anxious  Current Medications       Current Outpatient Medications:     citalopram (CeleXA) 20 mg tablet, Take 1 tablet (20 mg total) by mouth daily, Disp: 90 tablet, Rfl: 0    Multiple Vitamins-Minerals (MULTIVITAL) tablet, Take 1 tablet by mouth daily, Disp: , Rfl:     cefdinir (OMNICEF) 300 mg capsule, Take 1 capsule (300 mg total) by mouth every 12 (twelve) hours for 10 days, Disp: 20 capsule, Rfl: 0    fluticasone (FLONASE) 50 mcg/act nasal spray, 2 sprays into each nostril daily (Patient not taking: Reported on 3/23/2019), Disp: 1 Bottle, Rfl: 0    Current Allergies     Allergies as of 03/23/2019    (No Known Allergies)            The following portions of the patient's history were reviewed and updated as appropriate: allergies, current medications, past family history, past medical history, past social history, past surgical history and problem list      Past Medical History:   Diagnosis Date    Acute otitis media, right 3/23/2019    Anxiety     Anxiety and depression     Last Assessed: 4/17/2017    Depression     No meds currently   Last Assessed: 9/24/2015    Dysuria     Last Assessed: 8/1/2016    Endometriosis     Last Assessed: 1/23/2015    Fatigue     Last Assessed: 9/24/2015    Granulation tissue at vaginal vault     Last Assessed: 2/6/2017    Irregular menses     Migraines     Pelvic pain in female     Left pelvic area   Trichomoniasis     Resolved: 5/18/2017     Vaginal spotting     Last Assessed: 1/30/2017       Past Surgical History:   Procedure Laterality Date    APPENDECTOMY  10/06/2016    HYSTERECTOMY      LAPAROSCOPIC ENDOMETRIOSIS FULGURATION  2012    PELVIC LAPAROSCOPY      ID LAP,APPENDECTOMY N/A 10/6/2016    Procedure: APPENDECTOMY LAPAROSCOPIC;  Surgeon: Ekaterina Naylor MD;  [facility];  Service: General    ID LAPAROSCOPY W TOT HYSTERECTUTERUS <=250 [address] Oms TUBE/OVARY N/A 7/25/2016    Procedure: HYSTERECTOMY LAPAROSCOPIC TOTAL  LSO  right salpingectomy  diagnostic cystoscopy;  Surgeon: Prakash Colbert MD;  [facility];  Service: Gynecology    TUBAL LIGATION  03/2015    VAGINAL HYSTERECTOMY      Last Assessed: 9/16/2016       Family History   Problem Relation Age of Onset    Thyroid disease Mother     Sleep apnea Mother     Parkinsonism Paternal Grandfather          Medications have been verified  Objective   /69   Pulse 68   Temp 98 1 °F (36 7 °C)   Resp 16   LMP  (LMP Unknown) Comment: uhcg negative  SpO2 99%        Physical Exam     Physical Exam   Constitutional: She is oriented to person, place, and time  She appears well-developed and well-nourished  No distress  HENT:   Head: Normocephalic and atraumatic  Right Ear: External ear and ear canal normal  Tympanic membrane is bulging  A middle ear effusion is present  Left Ear: External ear and ear canal normal  Tympanic membrane is bulging  Mouth/Throat: Oropharynx is clear and moist    Eyes: Pupils are equal, round, and reactive to light  Conjunctivae and EOM are normal  Right eye exhibits no discharge  Left eye exhibits no discharge  No scleral icterus  Neck: Normal range of motion  Neck supple  No thyromegaly present  Cardiovascular: Normal rate, regular rhythm and normal heart sounds  Exam reveals no gallop and no friction rub  No murmur heard    Pulmonary/Chest: Effort normal and breath sounds normal  No respiratory distress  She has no wheezes  She has no rales  Musculoskeletal: Normal range of motion  She exhibits no edema  Lymphadenopathy:     She has cervical adenopathy  Neurological: She is alert and oriented to person, place, and time  No cranial nerve deficit  Skin: Skin is warm and dry  No rash noted  She is not diaphoretic  No erythema  No pallor

## 2019-03-23 NOTE — PATIENT INSTRUCTIONS
Take antibiotic twice daily for 10 days   Take complete course even if feeling better  Restart flonase 2 sprays per nostril once daily  Start Allegra once daily  Take ibuprofen 600 mg up to every 8 hours for pain  See ENT ASAP

## 2019-03-25 ENCOUNTER — TELEPHONE (OUTPATIENT)
Dept: OTOLARYNGOLOGY | Facility: CLINIC | Age: 29
End: 2019-03-25

## 2019-03-25 NOTE — TELEPHONE ENCOUNTER
Pt is scheduled and is on short call list but is having terrific pain in her ears  I do have a call out to Dr Emelia Mohamud to see if she can do anything for it  Pt states that she was in [facility] but they said to see ENT  I did refer her to Angelito or Johnie

## 2019-03-29 ENCOUNTER — OFFICE VISIT (OUTPATIENT)
Dept: OTOLARYNGOLOGY | Facility: CLINIC | Age: 29
End: 2019-03-29
Payer: COMMERCIAL

## 2019-03-29 VITALS
BODY MASS INDEX: 24.8 KG/M2 | DIASTOLIC BLOOD PRESSURE: 70 MMHG | WEIGHT: 158 LBS | HEIGHT: 67 IN | SYSTOLIC BLOOD PRESSURE: 120 MMHG

## 2019-03-29 DIAGNOSIS — H69.93 DISORDER OF EUSTACHIAN TUBE, BILATERAL: Primary | ICD-10-CM

## 2019-03-29 PROCEDURE — 99242 OFF/OP CONSLTJ NEW/EST SF 20: CPT | Performed by: OTOLARYNGOLOGY

## 2019-03-29 NOTE — PROGRESS NOTES
Review of Systems   Constitutional: Negative  HENT: Positive for ear pain  Eyes: Negative  Respiratory: Negative  Cardiovascular: Negative  Gastrointestinal: Negative  Endocrine: Negative  Genitourinary: Negative  Musculoskeletal: Negative  Skin: Negative  Allergic/Immunologic: Negative  Neurological: Positive for dizziness  Hematological: Negative  Psychiatric/Behavioral: Negative

## 2019-03-29 NOTE — PROGRESS NOTES
Consultation - Otolaryngology - Head and Neck Surgery  Facial Plastic and Reconstructive Surgery  Joanna Patient 29 y o  female MRN: [id number]  Encounter: [id number]        Assessment/Plan:  1  Disorder of Eustachian tube, bilateral         There is no effusion or infection noted on exam today  We discussed at length eustachian tube dysfunction and management options  Options include continued observation, evaluation with audiogram and tympanometry, nasal steroid spray, oral steroids, or eustachian tube dilation  Recommend comprehensive audiogram and tympanometry to evaluate  Continue with daily Flonase  In the absence of acute ear findings, her symptoms may be due to TMJ  We discussed soft diet for 2 weeks, appropriate use of NSAIDs for 1 week, warm compresses, massage and PT for TMJ therapy  Follow up in 2 weeks to review audiogram and re evaluate  History of Present Illness   Physician Requesting Consult: Author Brent DO  Reason for Consult / Principal Problem: Ear pressure  HPI: Joanna Patient is a 29y o  year old female who presents with ear pressure and pain that has been persistent since February  She notes around that time, she began to have recurrent serous otitis media, diagnosed by urgent care and PCP  She was treated with several rounds of antibiotics and a prednisone taper  She has also been using daily Flonase  She continues to have bilateral ear pain, currently managing with ibuprofen  She notes hearing has been intermittently muffled  No recent hearing tests  Denies history of frequent ear infections as a child  No prior tympanostomy tubes  No otorrhea  Review of systems:  10 Point ROS was performed and negative except as above or otherwise noted in the medical record  Historical Information   Past Medical History:   Diagnosis Date    Acute otitis media, right 3/23/2019    Anxiety     Anxiety and depression     Last Assessed: 4/17/2017    Depression     No meds currently   Last Assessed: 2015    Dysuria     Last Assessed: 2016    Endometriosis     Last Assessed: 2015    Fatigue     Last Assessed: 2015    Granulation tissue at vaginal vault     Last Assessed: 2017    Irregular menses     Migraines     Pelvic pain in female     Left pelvic area   Trichomoniasis     Resolved: 2017     Vaginal spotting     Last Assessed: 2017     Past Surgical History:   Procedure Laterality Date    APPENDECTOMY  10/06/2016    HYSTERECTOMY      LAPAROSCOPIC ENDOMETRIOSIS FULGURATION      PELVIC LAPAROSCOPY      NE LAP,APPENDECTOMY N/A 10/6/2016    Procedure: Cloyde Haylie;  Surgeon: Jonatan Zaragoza MD;  [facility];  Service: General    NE LAPAROSCOPY W TOT HYSTERECTUTERUS <=250 [address] TUBE/OVARY N/A 2016    Procedure: HYSTERECTOMY LAPAROSCOPIC TOTAL  LSO  right salpingectomy  diagnostic cystoscopy;  Surgeon: Yael Arita MD;  [facility];  Service: Gynecology    TUBAL LIGATION  2015    VAGINAL HYSTERECTOMY      Last Assessed: 2016     Social History   Social History     Substance and Sexual Activity   Alcohol Use Yes    Frequency: 2-4 times a month    Comment: Socially     Social History     Substance and Sexual Activity   Drug Use No     Social History     Tobacco Use   Smoking Status Former Smoker    Years: 5 00    Types: Cigarettes    Last attempt to quit: 2013    Years since quittin 3   Smokeless Tobacco Never Used   Tobacco Comment    Quit 5 years ago   Smoked 5 cigarettes daily     Family History:   Family History   Problem Relation Age of Onset    Thyroid disease Mother     Sleep apnea Mother     Parkinsonism Paternal Grandfather        Current Outpatient Medications on File Prior to Visit   Medication Sig    citalopram (CeleXA) 20 mg tablet Take 1 tablet (20 mg total) by mouth daily    fluticasone (FLONASE) 50 mcg/act nasal spray 2 sprays into each nostril daily    Multiple Vitamins-Minerals (MULTIVITAL) tablet Take 1 tablet by mouth daily    [DISCONTINUED] cefdinir (OMNICEF) 300 mg capsule Take 1 capsule (300 mg total) by mouth every 12 (twelve) hours for 10 days     No current facility-administered medications on file prior to visit  No Known Allergies    Vitals:    03/29/19 1302   BP: 120/70       Physical Exam   Constitutional: Oriented to person, place, and time  Well-developed and well-nourished, no apparent distress, non-toxic appearance  Cooperative, able to hear and answer questions without difficulty  Voice: Normal voice quality  Head: Normocephalic, atraumatic  No scars, masses or lesions  Face: Symmetric, no edema, no sinus tenderness  Eyes: Vision grossly intact, extra-ocular movement intact  Ears: External ears normal  Tympanic membranes intact with intact normal landmarks  No post-auricular erythema or tenderness  Nose: Septum intact, nares clear  Mucosa moist, turbinates well appearing  No crusting, polyps or discharge evident  Oral cavity: Dentition intact  Mucosa moist, lips without lesions or masses  Tongue mobile, floor of mouth soft and flat  Hard palate intact  No masses or lesions  Oropharynx: Uvula is midline, soft palate intact without lesion or mass  Oropharyngeal inlet without obstruction  Tonsils unremarkable  Posterior pharyngeal wall clear  No masses or lesions  Salivary glands:  Parotid glands and submandibular glands symmetric, no enlargement or tenderness  Neck: Normal laryngeal elevation with swallow  Trachea midline  No masses or lesions  No palpable adenopathy  Thyroid: Without tenderness or palpable nodules  Pulmonary/Chest: Normal effort and rate  No respiratory distress  No stertor or stridor  Musculoskeletal: Normal range of motion  Neurological: Cranial nerves 2-12 intact  Skin: Skin is warm and dry  Psychiatric: Normal mood and affect  Imaging Studies: I have personally reviewed pertinent reports  Lab Results:  I have personally reviewed pertinent lab results  Greater than 40 minutes were spent in consultation  More than 1/2 of that time was spent in counselling and coordination of care

## 2019-05-06 ENCOUNTER — TELEPHONE (OUTPATIENT)
Dept: SLEEP CENTER | Facility: CLINIC | Age: 29
End: 2019-05-06

## 2019-05-24 ENCOUNTER — OFFICE VISIT (OUTPATIENT)
Dept: URGENT CARE | Facility: CLINIC | Age: 29
End: 2019-05-24
Payer: COMMERCIAL

## 2019-05-24 VITALS
TEMPERATURE: 97 F | WEIGHT: 150 LBS | HEART RATE: 74 BPM | OXYGEN SATURATION: 97 % | DIASTOLIC BLOOD PRESSURE: 70 MMHG | HEIGHT: 67 IN | SYSTOLIC BLOOD PRESSURE: 102 MMHG | BODY MASS INDEX: 23.54 KG/M2 | RESPIRATION RATE: 20 BRPM

## 2019-05-24 DIAGNOSIS — Z02.4 DRIVER'S PERMIT PE (PHYSICAL EXAMINATION): Primary | ICD-10-CM

## 2019-06-17 ENCOUNTER — OFFICE VISIT (OUTPATIENT)
Dept: FAMILY MEDICINE CLINIC | Facility: CLINIC | Age: 29
End: 2019-06-17
Payer: COMMERCIAL

## 2019-06-17 VITALS
DIASTOLIC BLOOD PRESSURE: 74 MMHG | SYSTOLIC BLOOD PRESSURE: 106 MMHG | BODY MASS INDEX: 23.26 KG/M2 | HEIGHT: 67 IN | WEIGHT: 148.2 LBS

## 2019-06-17 DIAGNOSIS — F41.9 ANXIETY AND DEPRESSION: ICD-10-CM

## 2019-06-17 DIAGNOSIS — F32.A ANXIETY AND DEPRESSION: ICD-10-CM

## 2019-06-17 PROCEDURE — 3008F BODY MASS INDEX DOCD: CPT | Performed by: PHYSICIAN ASSISTANT

## 2019-06-17 PROCEDURE — 1036F TOBACCO NON-USER: CPT | Performed by: PHYSICIAN ASSISTANT

## 2019-06-17 PROCEDURE — 99214 OFFICE O/P EST MOD 30 MIN: CPT | Performed by: PHYSICIAN ASSISTANT

## 2019-06-17 RX ORDER — CITALOPRAM 20 MG/1
30 TABLET ORAL DAILY
Qty: 135 TABLET | Refills: 0 | Status: SHIPPED | OUTPATIENT
Start: 2019-06-17 | End: 2019-10-28 | Stop reason: SDUPTHER

## 2019-08-09 ENCOUNTER — OFFICE VISIT (OUTPATIENT)
Dept: OBGYN CLINIC | Facility: CLINIC | Age: 29
End: 2019-08-09
Payer: COMMERCIAL

## 2019-08-09 ENCOUNTER — APPOINTMENT (OUTPATIENT)
Dept: LAB | Facility: CLINIC | Age: 29
End: 2019-08-09
Payer: COMMERCIAL

## 2019-08-09 VITALS — BODY MASS INDEX: 23.05 KG/M2 | DIASTOLIC BLOOD PRESSURE: 70 MMHG | SYSTOLIC BLOOD PRESSURE: 108 MMHG | WEIGHT: 147.2 LBS

## 2019-08-09 DIAGNOSIS — L70.0 ACNE VULGARIS: ICD-10-CM

## 2019-08-09 DIAGNOSIS — R10.31 RLQ ABDOMINAL PAIN: ICD-10-CM

## 2019-08-09 DIAGNOSIS — R10.31 CHRONIC RLQ PAIN: ICD-10-CM

## 2019-08-09 DIAGNOSIS — L70.0 ACNE VULGARIS: Primary | ICD-10-CM

## 2019-08-09 DIAGNOSIS — G89.29 CHRONIC RLQ PAIN: ICD-10-CM

## 2019-08-09 LAB
FSH SERPL-ACNC: 9.7 MIU/ML
TSH SERPL DL<=0.05 MIU/L-ACNC: 1.5 UIU/ML (ref 0.36–3.74)

## 2019-08-09 PROCEDURE — 84443 ASSAY THYROID STIM HORMONE: CPT

## 2019-08-09 PROCEDURE — 83001 ASSAY OF GONADOTROPIN (FSH): CPT

## 2019-08-09 PROCEDURE — 36415 COLL VENOUS BLD VENIPUNCTURE: CPT

## 2019-08-09 PROCEDURE — 84402 ASSAY OF FREE TESTOSTERONE: CPT

## 2019-08-09 PROCEDURE — 84403 ASSAY OF TOTAL TESTOSTERONE: CPT

## 2019-08-09 PROCEDURE — 82627 DEHYDROEPIANDROSTERONE: CPT

## 2019-08-09 PROCEDURE — 99214 OFFICE O/P EST MOD 30 MIN: CPT | Performed by: OBSTETRICS & GYNECOLOGY

## 2019-08-09 NOTE — PROGRESS NOTES
Assessment Lisette Younger was seen today for discussion  Diagnoses and all orders for this visit:    Acne vulgaris  -     Follicle stimulating hormone; Future  -     DHEA-sulfate; Future  -     Testosterone, free, total; Future  -     TSH, 3rd generation with Free T4 reflex; Future    Chronic RLQ pain    RLQ abdominal pain  -     US pelvis complete w transvaginal; Future         Plan  Check labs and ultrasound  Subjective   Cherie Brumfield is a 29 y o  female here for a problem visit  Patient is complaining of increasing acne, right lower quadrant pain, and vaginal dryness during intercourse  States symptoms of acne started a few months ago and have been progressively worsening  Thought it was in association with stress, but states she does not see the correlation  Reports intermittent RLQ pain  States the pain started around the same time as her other symptoms  States the last time she had intercourse, she states she was dryer than usual   Has been taking Celexa for years  Patient Active Problem List   Diagnosis    Anxiety and depression    Acute otitis media, right    Disorder of Eustachian tube, bilateral       Gynecologic History  No LMP recorded (lmp unknown)  Patient has had a hysterectomy  The current method of family planning is status post hysterectomy  Past Medical History:   Diagnosis Date    Acute otitis media, right 3/23/2019    Anxiety     Anxiety and depression     Last Assessed: 4/17/2017    Depression     No meds currently  Last Assessed: 9/24/2015    Dysuria     Last Assessed: 8/1/2016    Endometriosis     Last Assessed: 1/23/2015    Fatigue     Last Assessed: 9/24/2015    Granulation tissue at vaginal vault     Last Assessed: 2/6/2017    Irregular menses     Migraines     Pelvic pain in female     Left pelvic area      Trichomoniasis     Resolved: 5/18/2017     Vaginal spotting     Last Assessed: 1/30/2017     Past Surgical History:   Procedure Laterality Date    APPENDECTOMY 10/06/2016    HYSTERECTOMY      LAPAROSCOPIC ENDOMETRIOSIS FULGURATION  2012    PELVIC LAPAROSCOPY      NM LAP,APPENDECTOMY N/A 10/6/2016    Procedure: Jag Gonzalez;  Surgeon: Raeford Alpers, MD;  [facility];  Service: General    NM LAPAROSCOPY W TOT HYSTERECTUTERUS <=250 Preeti Flatter  W TUBE/OVARY N/A 2016    Procedure: HYSTERECTOMY LAPAROSCOPIC TOTAL  LSO  right salpingectomy  diagnostic cystoscopy;  Surgeon: Loan Liu MD;  [facility];  Service: Gynecology    TUBAL LIGATION  2015    VAGINAL HYSTERECTOMY      Last Assessed: 2016     Family History   Problem Relation Age of Onset    Thyroid disease Mother     Sleep apnea Mother     Parkinsonism Paternal Grandfather      Social History     Socioeconomic History    Marital status:      Spouse name: Not on file    Number of children: Not on file    Years of education: Not on file    Highest education level: Not on file   Occupational History    Not on file   Social Needs    Financial resource strain: Not on file    Food insecurity:     Worry: Not on file     Inability: Not on file    Transportation needs:     Medical: Not on file     Non-medical: Not on file   Tobacco Use    Smoking status: Former Smoker     Years: 5      Types: Cigarettes     Last attempt to quit: 2013     Years since quittin 6    Smokeless tobacco: Never Used    Tobacco comment: Quit 5 years ago   Smoked 5 cigarettes daily   Substance and Sexual Activity    Alcohol use: Yes     Frequency: 2-4 times a month     Comment: Socially    Drug use: No    Sexual activity: Yes     Partners: Male   Lifestyle    Physical activity:     Days per week: Not on file     Minutes per session: Not on file    Stress: Not on file   Relationships    Social connections:     Talks on phone: Not on file     Gets together: Not on file     Attends Sikhism service: Not on file     Active member of club or organization: Not on file Attends meetings of clubs or organizations: Not on file     Relationship status: Not on file    Intimate partner violence:     Fear of current or ex partner: Not on file     Emotionally abused: Not on file     Physically abused: Not on file     Forced sexual activity: Not on file   Other Topics Concern    Not on file   Social History Narrative    No living will    Marital Problem - going through divorce     No Known Allergies    Current Outpatient Medications:     citalopram (CeleXA) 20 mg tablet, Take 1 5 tablets (30 mg total) by mouth daily for 90 days, Disp: 135 tablet, Rfl: 0    Multiple Vitamins-Minerals (MULTIVITAL) tablet, Take 1 tablet by mouth daily, Disp: , Rfl:     Review of Systems  Constitutional :no fever, feels well, no tiredness, no recent weight gain or loss  ENT: no ear ache, no loss of hearing, no nosebleeds or nasal discharge, no sore throat or hoarseness  Cardiovascular: no complaints of slow or fast heart beat, no chest pain, no palpitations, no leg claudication or lower extremity edema  Respiratory: no complaints of shortness of shortness of breath, no GUZMAN  Breasts:no complaints of breast pain, breast lump, or nipple discharge  Gastrointestinal: no complaints of abdominal pain, constipation, nausea, vomiting, or diarrhea or bloody stools  Genitourinary : no complaints of dysuria, incontinence, pelvic pain, no dysmenorrhea, vaginal discharge or abnormal vaginal bleeding and as noted in HPI  Musculoskeletal: no complaints of arthralgia, no myalgia, no joint swelling or stiffness, no limb pain or swelling    Integumentary: no complaints of skin rash or lesion, itching or dry skin  Neurological: no complaints of headache, no confusion, no numbness or tingling, no dizziness or fainting     Objective     /70   Wt 66 8 kg (147 lb 3 2 oz)   LMP  (LMP Unknown) Comment: uhcg negative  BMI 23 05 kg/m²     General:   appears stated age, cooperative, alert normal mood and affect   Neck: normal, supple,trachea midline, no masses   Heart: regular rate and rhythm, S1, S2 normal, no murmur, click, rub or gallop   Lungs: clear to auscultation bilaterally

## 2019-08-10 LAB
DHEA-S SERPL-MCNC: 242.5 UG/DL (ref 84.8–378)
TESTOST FREE SERPL-MCNC: 0.8 PG/ML (ref 0–4.2)
TESTOST SERPL-MCNC: 19 NG/DL (ref 8–48)

## 2019-08-13 ENCOUNTER — HOSPITAL ENCOUNTER (OUTPATIENT)
Dept: ULTRASOUND IMAGING | Facility: HOSPITAL | Age: 29
Discharge: HOME | End: 2019-08-13
Payer: COMMERCIAL

## 2019-08-13 DIAGNOSIS — R10.31 RLQ ABDOMINAL PAIN: ICD-10-CM

## 2019-08-13 PROCEDURE — 76830 TRANSVAGINAL US NON-OB: CPT

## 2019-08-13 PROCEDURE — 76856 US EXAM PELVIC COMPLETE: CPT

## 2019-08-21 ENCOUNTER — TELEPHONE (OUTPATIENT)
Dept: OBGYN CLINIC | Facility: MEDICAL CENTER | Age: 29
End: 2019-08-21

## 2019-08-21 NOTE — TELEPHONE ENCOUNTER
I called and made the patient aware of her results of her ultrasound that was reviewed by Dr Seb Tavares

## 2019-09-24 ENCOUNTER — OFFICE VISIT (OUTPATIENT)
Dept: URGENT CARE | Facility: CLINIC | Age: 29
End: 2019-09-24
Payer: COMMERCIAL

## 2019-09-24 VITALS
OXYGEN SATURATION: 97 % | SYSTOLIC BLOOD PRESSURE: 121 MMHG | HEART RATE: 72 BPM | TEMPERATURE: 98 F | DIASTOLIC BLOOD PRESSURE: 60 MMHG | HEIGHT: 67 IN | BODY MASS INDEX: 23.07 KG/M2 | RESPIRATION RATE: 16 BRPM | WEIGHT: 147 LBS

## 2019-09-24 DIAGNOSIS — B00.1 HERPES LABIALIS: ICD-10-CM

## 2019-09-24 DIAGNOSIS — L03.116 CELLULITIS OF LEFT LOWER EXTREMITY: Primary | ICD-10-CM

## 2019-09-24 PROCEDURE — 99283 EMERGENCY DEPT VISIT LOW MDM: CPT | Performed by: PHYSICIAN ASSISTANT

## 2019-09-24 PROCEDURE — 99213 OFFICE O/P EST LOW 20 MIN: CPT | Performed by: PHYSICIAN ASSISTANT

## 2019-09-24 PROCEDURE — G0382 LEV 3 HOSP TYPE B ED VISIT: HCPCS | Performed by: PHYSICIAN ASSISTANT

## 2019-09-24 RX ORDER — VALACYCLOVIR HYDROCHLORIDE 1 G/1
TABLET, FILM COATED ORAL
Qty: 20 TABLET | Refills: 0 | Status: SHIPPED | OUTPATIENT
Start: 2019-09-24 | End: 2020-05-12 | Stop reason: ALTCHOICE

## 2019-09-24 RX ORDER — CEPHALEXIN 500 MG/1
500 CAPSULE ORAL 3 TIMES DAILY
Qty: 21 CAPSULE | Refills: 0 | Status: SHIPPED | OUTPATIENT
Start: 2019-09-24 | End: 2019-10-01

## 2019-09-24 RX ORDER — CITALOPRAM 20 MG/1
20 TABLET ORAL DAILY
COMMUNITY
End: 2019-10-30 | Stop reason: ALTCHOICE

## 2019-09-24 NOTE — PROGRESS NOTES
[facility]        NAME: Robby Muniz is a 29 y o  female  : 1990    MRN: [id number]  DATE: 2019  TIME: 3:31 PM    Assessment and Plan   Cellulitis of left lower extremity [L03 116]  1  Cellulitis of left lower extremity  cephalexin (KEFLEX) 500 mg capsule   2  Herpes labialis  valACYclovir (VALTREX) 1,000 mg tablet         Patient Instructions     Start Keflex as directed for cellulitis  Take Valtrex for herpes outbreak  Follow up with PCP in 3-5 days  Proceed to  ER if symptoms worsen  Chief Complaint     Chief Complaint   Patient presents with    Leg Pain     left         History of Present Illness       HPI    Review of Systems   Review of Systems      Current Medications       Current Outpatient Medications:     citalopram (CeleXA) 20 mg tablet, Take 20 mg by mouth daily, Disp: , Rfl:     Multiple Vitamins-Minerals (MULTIVITAL) tablet, Take 1 tablet by mouth daily, Disp: , Rfl:     cephalexin (KEFLEX) 500 mg capsule, Take 1 capsule (500 mg total) by mouth 3 (three) times a day for 7 days, Disp: 21 capsule, Rfl: 0    citalopram (CeleXA) 20 mg tablet, Take 1 5 tablets (30 mg total) by mouth daily for 90 days, Disp: 135 tablet, Rfl: 0    valACYclovir (VALTREX) 1,000 mg tablet, Two tablets every 12 hours for 1 day, Disp: 20 tablet, Rfl: 0    Current Allergies     Allergies as of 2019    (No Known Allergies)            The following portions of the patient's history were reviewed and updated as appropriate: allergies, current medications, past family history, past medical history, past social history, past surgical history and problem list      Past Medical History:   Diagnosis Date    Acute otitis media, right 3/23/2019    Anxiety     Anxiety and depression     Last Assessed: 2017    Depression     No meds currently   Last Assessed: 2015    Dysuria     Last Assessed: 2016    Endometriosis     Last Assessed: 2015    Fatigue     Last Assessed: 9/24/2015    Granulation tissue at vaginal vault     Last Assessed: 2/6/2017    Irregular menses     Migraines     Pelvic pain in female     Left pelvic area   Trichomoniasis     Resolved: 5/18/2017     Vaginal spotting     Last Assessed: 1/30/2017       Past Surgical History:   Procedure Laterality Date    APPENDECTOMY  10/06/2016    HYSTERECTOMY      LAPAROSCOPIC ENDOMETRIOSIS FULGURATION  2012    PELVIC LAPAROSCOPY      [place] LAP,APPENDECTOMY N/A 10/6/2016    Procedure: APPENDECTOMY LAPAROSCOPIC;  Surgeon: Jose Hobbs MD;  [facility];  Service: General    [place] LAPAROSCOPY W TOT HYSTERECTUTERUS <=250 [facility] TUBE/OVARY N/A 7/25/2016    Procedure: HYSTERECTOMY LAPAROSCOPIC TOTAL  LSO  right salpingectomy  diagnostic cystoscopy;  Surgeon: Rachael Joya MD;  [facility];  Service: Gynecology    TUBAL LIGATION  03/2015    VAGINAL HYSTERECTOMY      Last Assessed: 9/16/2016       Family History   Problem Relation Age of Onset    Thyroid disease Mother     Sleep apnea Mother     Parkinsonism Paternal Grandfather          Medications have been verified          Objective   /60   Pulse 72   Temp 98 °F (36 7 °C)   Resp 16   Ht 5' 7" (1 702 m)   Wt 66 7 kg (147 lb)   LMP  (LMP Unknown) Comment: uhcg negative  SpO2 97%   BMI 23 02 kg/m²        Physical Exam     Physical Exam

## 2019-09-24 NOTE — PATIENT INSTRUCTIONS
Cellulitis   AMBULATORY CARE:   Cellulitis  is a skin infection caused by bacteria  Cellulitis usually appears on the legs and feet, arms and hands, or face  Common signs and symptoms include the following:   · A red, warm, swollen area on your skin    · Pain when the area is touched    · Bumps or blisters (abscess) that may drain pus    · Bumpy, raised skin that feels like an orange peel  Call 911 if:   · You have sudden trouble breathing or chest pain  Seek care immediately if:   · Your wound gets larger and more painful  · You feel a crackling under your skin when you touch it  · You have purple dots or bumps on your skin, or you see bleeding under your skin  · You have new swelling and pain in your legs  · The red, warm, swollen area gets larger  · You see red streaks coming from the infected area  Contact your healthcare provider if:   · You have a fever  · Your fever or pain does not go away or gets worse  · The area does not get smaller after 2 days of antibiotics  · Your skin is flaking or peeling off  · You have questions or concerns about your condition or care  Treatment for cellulitis  may decrease symptoms, stop the infection from spreading, and cure the infection  Treatment depends on how severe your cellulitis is  Cellulitis may go away on its own  You may  instead need antibiotics to help treat the bacterial infection and medicines for pain  Your healthcare provider may draw a Hoh around the edges of your cellulitis  If your cellulitis spreads, your healthcare provider will see it outside of the Hoh  Manage your symptoms:   · Elevate the area above the level of your heart  as often as you can  This will help decrease swelling and pain  Prop the area on pillows or blankets to keep it elevated comfortably  · Clean the area daily until the wound scabs over  Gently wash the area with soap and water  Pat dry  Use dressings as directed       · Place cool or warm, wet cloths on the area as directed  Use clean cloths and clean water  Leave it on the area until the cloth is room temperature  Pat the area dry with a clean, dry cloth  The cloths may help decrease pain  Prevent cellulitis:   · Do not scratch bug bites or areas of injury  You increase your risk for cellulitis by scratching these areas  · Do not share personal items, such as towels, clothing, and razors  · Clean exercise equipment  with germ-killing  before and after you use it  · Wash your hands often  Use soap and water  Wash your hands after you use the bathroom, change a child's diapers, or sneeze  Wash your hands before you prepare or eat food  Use lotion to prevent dry, cracked skin  · Wear pressure stockings as directed  You may be told to wear the stockings if you have peripheral edema  The stockings improve blood flow and decrease swelling  · Treat athlete's foot  This can help prevent the spread of a bacterial skin infection  Follow up with your healthcare provider within 3 days, or as directed: Your healthcare provider will check if your cellulitis is getting better  You may need different medicine  Write down your questions so you remember to ask them during your visits  © 2017 [employer]  Information is for End User's use only and may not be sold, redistributed or otherwise used for commercial purposes  All illustrations and images included in CareNotes® are the copyrighted property of [employer]  or [employer]  The above information is an  only  It is not intended as medical advice for individual conditions or treatments  Talk to your doctor, nurse or pharmacist before following any medical regimen to see if it is safe and effective for you

## 2019-10-28 DIAGNOSIS — F32.A ANXIETY AND DEPRESSION: ICD-10-CM

## 2019-10-28 DIAGNOSIS — F41.9 ANXIETY AND DEPRESSION: ICD-10-CM

## 2019-10-28 RX ORDER — CITALOPRAM 20 MG/1
30 TABLET ORAL DAILY
Qty: 45 TABLET | Refills: 0 | Status: SHIPPED | OUTPATIENT
Start: 2019-10-28 | End: 2019-10-30 | Stop reason: SDUPTHER

## 2019-10-28 NOTE — TELEPHONE ENCOUNTER
Patient would like refill since she had to reschedule her appt for today   She is scheduled for Monday

## 2019-10-30 ENCOUNTER — OFFICE VISIT (OUTPATIENT)
Dept: FAMILY MEDICINE CLINIC | Facility: CLINIC | Age: 29
End: 2019-10-30
Payer: COMMERCIAL

## 2019-10-30 VITALS
BODY MASS INDEX: 24.04 KG/M2 | HEIGHT: 67 IN | DIASTOLIC BLOOD PRESSURE: 76 MMHG | WEIGHT: 153.2 LBS | SYSTOLIC BLOOD PRESSURE: 108 MMHG

## 2019-10-30 DIAGNOSIS — R51.9 INTRACTABLE HEADACHE, UNSPECIFIED CHRONICITY PATTERN, UNSPECIFIED HEADACHE TYPE: Primary | ICD-10-CM

## 2019-10-30 DIAGNOSIS — F41.9 ANXIETY AND DEPRESSION: ICD-10-CM

## 2019-10-30 DIAGNOSIS — F32.A ANXIETY AND DEPRESSION: ICD-10-CM

## 2019-10-30 PROCEDURE — 99214 OFFICE O/P EST MOD 30 MIN: CPT | Performed by: PHYSICIAN ASSISTANT

## 2019-10-30 RX ORDER — CITALOPRAM 20 MG/1
30 TABLET ORAL DAILY
Qty: 45 TABLET | Refills: 2 | Status: SHIPPED | OUTPATIENT
Start: 2019-10-30 | End: 2020-01-13 | Stop reason: SDUPTHER

## 2019-10-30 RX ORDER — SUMATRIPTAN 100 MG/1
100 TABLET, FILM COATED ORAL ONCE AS NEEDED
Qty: 30 TABLET | Refills: 0 | Status: SHIPPED | OUTPATIENT
Start: 2019-10-30 | End: 2019-11-04

## 2019-10-30 RX ORDER — KETOROLAC TROMETHAMINE 30 MG/ML
30 INJECTION, SOLUTION INTRAMUSCULAR; INTRAVENOUS ONCE
Status: COMPLETED | OUTPATIENT
Start: 2019-10-30 | End: 2019-10-30

## 2019-10-30 RX ADMIN — KETOROLAC TROMETHAMINE 30 MG: 30 INJECTION, SOLUTION INTRAMUSCULAR; INTRAVENOUS at 11:10

## 2019-10-30 NOTE — PROGRESS NOTES
Assessment/Plan:    Problem List Items Addressed This Visit        Other    Anxiety and depression    Relevant Medications    citalopram (CeleXA) 20 mg tablet      Other Visit Diagnoses     Intractable headache, unspecified chronicity pattern, unspecified headache type    -  Primary    Relevant Medications    ketorolac (TORADOL) 60 mg/2 mL IM injection 30 mg (Completed)    SUMAtriptan (IMITREX) 100 mg tablet           Diagnoses and all orders for this visit:    Intractable headache, unspecified chronicity pattern, unspecified headache type  -     ketorolac (TORADOL) 60 mg/2 mL IM injection 30 mg  -     SUMAtriptan (IMITREX) 100 mg tablet; Take 1 tablet (100 mg total) by mouth once as needed for migraine for up to 1 dose    Anxiety and depression  -     citalopram (CeleXA) 20 mg tablet; Take 1 5 tablets (30 mg total) by mouth daily        Will try Toradol today as well as Imitrex PRN  Call if symptoms do not subside  Otherwise, please RTO in 3 months  Subjective:      Patient ID: Riddhi Cunningham is a 29 y o  female  Guevarales Christiana is here today to follow up for anxiety and depression as well as to discuss worsening headaches  Anxiety and depression are stable and she would like a refill on citalopram 300 mg  As for her headaches, she's had a daily headache x 2 weeks  She's tried Tylenol and ibuprofen without relief  She does get some mild relief with a hot shower  Has accompanying symptoms of blurry vision, nausea, and vomiting at times  The following portions of the patient's history were reviewed and updated as appropriate:   She has a past medical history of Acute otitis media, right (3/23/2019), Anxiety, Anxiety and depression, Depression, Dysuria, Endometriosis, Fatigue, Granulation tissue at vaginal vault, Irregular menses, Migraines, Pelvic pain in female, Trichomoniasis, and Vaginal spotting ,  does not have any pertinent problems on file  ,   has a past surgical history that includes Laparoscopy;  Tubal ligation (03/2015); Hysterectomy; pr lap,appendectomy (N/A, 10/6/2016); pr laparoscopy w tot hysterectuterus <=250 gram  w tube/ovary (N/A, 7/25/2016); Appendectomy (10/06/2016); Laparoscopic endometriosis fulguration (2012); and Vaginal hysterectomy  ,  family history includes Parkinsonism in her paternal grandfather; Sleep apnea in her mother; Thyroid disease in her mother  ,   reports that she quit smoking about 5 years ago  Her smoking use included cigarettes  She quit after 5 00 years of use  She has never used smokeless tobacco  She reports that she drinks alcohol  She reports that she does not use drugs  ,  has No Known Allergies     Current Outpatient Medications   Medication Sig Dispense Refill    citalopram (CeleXA) 20 mg tablet Take 1 5 tablets (30 mg total) by mouth daily 45 tablet 2    Multiple Vitamins-Minerals (MULTIVITAL) tablet Take 1 tablet by mouth daily      valACYclovir (VALTREX) 1,000 mg tablet Two tablets every 12 hours for 1 day 20 tablet 0    SUMAtriptan (IMITREX) 100 mg tablet Take 1 tablet (100 mg total) by mouth once as needed for migraine for up to 1 dose 30 tablet 0     No current facility-administered medications for this visit  Review of Systems   Constitutional: Negative for activity change, appetite change, chills, diaphoresis, fatigue, fever and unexpected weight change  HENT: Negative for congestion, ear pain, postnasal drip, rhinorrhea, sinus pressure, sinus pain, sneezing, sore throat, tinnitus and voice change  Eyes: Positive for photophobia and visual disturbance  Negative for pain and redness  Respiratory: Negative for cough, chest tightness, shortness of breath and wheezing  Cardiovascular: Negative for chest pain, palpitations and leg swelling  Gastrointestinal: Positive for nausea and vomiting  Negative for abdominal pain, blood in stool, constipation and diarrhea     Genitourinary: Negative for difficulty urinating, dysuria, frequency, hematuria and urgency  Musculoskeletal: Negative for arthralgias, back pain, gait problem, joint swelling, myalgias, neck pain and neck stiffness  Skin: Negative for color change, pallor, rash and wound  Neurological: Positive for headaches  Negative for dizziness, tremors, weakness and light-headedness  Psychiatric/Behavioral: Negative for dysphoric mood, self-injury, sleep disturbance and suicidal ideas  The patient is not nervous/anxious  Objective:  Vitals:    10/30/19 1042   BP: 108/76   BP Location: Left arm   Patient Position: Sitting   Weight: 69 5 kg (153 lb 3 2 oz)   Height: 5' 7" (1 702 m)     Body mass index is 23 99 kg/m²  Physical Exam   Constitutional: She is oriented to person, place, and time  She appears well-developed and well-nourished  She appears distressed (currently has headache, exam room lights are dimmed)  HENT:   Head: Normocephalic and atraumatic  Right Ear: Hearing, tympanic membrane, external ear and ear canal normal    Left Ear: Hearing, tympanic membrane, external ear and ear canal normal    Mouth/Throat: Uvula is midline, oropharynx is clear and moist and mucous membranes are normal  No oropharyngeal exudate  Eyes: Pupils are equal, round, and reactive to light  Conjunctivae are normal  Right eye exhibits no discharge  Left eye exhibits no discharge  No scleral icterus  Neck: Neck supple  Carotid bruit is not present  No thyromegaly present  Cardiovascular: Normal rate, regular rhythm and normal heart sounds  No murmur heard  Pulmonary/Chest: Effort normal and breath sounds normal  No respiratory distress  She has no wheezes  Abdominal: Soft  Bowel sounds are normal  She exhibits no distension and no mass  There is no tenderness  There is no rebound and no guarding  Musculoskeletal: Normal range of motion  She exhibits no edema or tenderness  Lymphadenopathy:     She has no cervical adenopathy     Neurological: She is alert and oriented to person, place, and time    Skin: Skin is warm and dry  No rash noted  She is not diaphoretic  No erythema  Psychiatric: She has a normal mood and affect  Her behavior is normal  Judgment and thought content normal    Vitals reviewed

## 2019-11-04 ENCOUNTER — OFFICE VISIT (OUTPATIENT)
Dept: FAMILY MEDICINE CLINIC | Facility: CLINIC | Age: 29
End: 2019-11-04
Payer: COMMERCIAL

## 2019-11-04 ENCOUNTER — APPOINTMENT (OUTPATIENT)
Dept: LAB | Facility: HOSPITAL | Age: 29
End: 2019-11-04
Payer: COMMERCIAL

## 2019-11-04 ENCOUNTER — HOSPITAL ENCOUNTER (EMERGENCY)
Facility: HOSPITAL | Age: 29
Discharge: HOME | End: 2019-11-04
Attending: EMERGENCY MEDICINE | Admitting: EMERGENCY MEDICINE
Payer: COMMERCIAL

## 2019-11-04 VITALS
WEIGHT: 152.6 LBS | HEIGHT: 67 IN | DIASTOLIC BLOOD PRESSURE: 84 MMHG | SYSTOLIC BLOOD PRESSURE: 110 MMHG | BODY MASS INDEX: 23.95 KG/M2

## 2019-11-04 VITALS
WEIGHT: 152 LBS | RESPIRATION RATE: 18 BRPM | TEMPERATURE: 98.8 F | OXYGEN SATURATION: 100 % | BODY MASS INDEX: 23.86 KG/M2 | HEART RATE: 72 BPM | HEIGHT: 67 IN | DIASTOLIC BLOOD PRESSURE: 78 MMHG | SYSTOLIC BLOOD PRESSURE: 118 MMHG

## 2019-11-04 DIAGNOSIS — R51.9 HEADACHE: Primary | ICD-10-CM

## 2019-11-04 DIAGNOSIS — R51.9 INTRACTABLE HEADACHE, UNSPECIFIED CHRONICITY PATTERN, UNSPECIFIED HEADACHE TYPE: Primary | ICD-10-CM

## 2019-11-04 DIAGNOSIS — R51.9 INTRACTABLE HEADACHE, UNSPECIFIED CHRONICITY PATTERN, UNSPECIFIED HEADACHE TYPE: ICD-10-CM

## 2019-11-04 PROBLEM — H69.93: Status: RESOLVED | Noted: 2019-03-23 | Resolved: 2019-11-04

## 2019-11-04 PROBLEM — H66.91 ACUTE OTITIS MEDIA, RIGHT: Status: RESOLVED | Noted: 2019-03-23 | Resolved: 2019-11-04

## 2019-11-04 LAB
ALBUMIN SERPL BCP-MCNC: 4.4 G/DL (ref 3.5–5)
ALP SERPL-CCNC: 62 U/L (ref 46–116)
ALT SERPL W P-5'-P-CCNC: 14 U/L (ref 12–78)
ANION GAP SERPL CALCULATED.3IONS-SCNC: 7 MMOL/L (ref 4–13)
AST SERPL W P-5'-P-CCNC: 9 U/L (ref 5–45)
BILIRUB SERPL-MCNC: 0.2 MG/DL (ref 0.2–1)
BUN SERPL-MCNC: 14 MG/DL (ref 5–25)
CALCIUM SERPL-MCNC: 9.2 MG/DL (ref 8.3–10.1)
CHLORIDE SERPL-SCNC: 101 MMOL/L (ref 100–108)
CO2 SERPL-SCNC: 30 MMOL/L (ref 21–32)
CREAT SERPL-MCNC: 0.79 MG/DL (ref 0.6–1.3)
ERYTHROCYTE [DISTWIDTH] IN BLOOD BY AUTOMATED COUNT: 11.9 % (ref 11.6–15.1)
GFR SERPL CREATININE-BSD FRML MDRD: 102 ML/MIN/1.73SQ M
GLUCOSE SERPL-MCNC: 86 MG/DL (ref 65–140)
HCT VFR BLD AUTO: 40.8 % (ref 34.8–46.1)
HGB BLD-MCNC: 13.8 G/DL (ref 11.5–15.4)
MCH RBC QN AUTO: 31.3 PG (ref 26.8–34.3)
MCHC RBC AUTO-ENTMCNC: 33.8 G/DL (ref 31.4–37.4)
MCV RBC AUTO: 93 FL (ref 82–98)
PLATELET # BLD AUTO: 223 THOUSANDS/UL (ref 149–390)
PMV BLD AUTO: 11.5 FL (ref 8.9–12.7)
POTASSIUM SERPL-SCNC: 3.9 MMOL/L (ref 3.5–5.3)
PROT SERPL-MCNC: 8.3 G/DL (ref 6.4–8.2)
RBC # BLD AUTO: 4.41 MILLION/UL (ref 3.81–5.12)
SODIUM SERPL-SCNC: 138 MMOL/L (ref 136–145)
TSH SERPL DL<=0.05 MIU/L-ACNC: 1.89 UIU/ML (ref 0.36–3.74)
WBC # BLD AUTO: 7.6 THOUSAND/UL (ref 4.31–10.16)

## 2019-11-04 PROCEDURE — 96365 THER/PROPH/DIAG IV INF INIT: CPT

## 2019-11-04 PROCEDURE — 84443 ASSAY THYROID STIM HORMONE: CPT

## 2019-11-04 PROCEDURE — 36415 COLL VENOUS BLD VENIPUNCTURE: CPT

## 2019-11-04 PROCEDURE — 99283 EMERGENCY DEPT VISIT LOW MDM: CPT

## 2019-11-04 PROCEDURE — 96375 TX/PRO/DX INJ NEW DRUG ADDON: CPT

## 2019-11-04 PROCEDURE — 99214 OFFICE O/P EST MOD 30 MIN: CPT | Performed by: PHYSICIAN ASSISTANT

## 2019-11-04 PROCEDURE — 86618 LYME DISEASE ANTIBODY: CPT

## 2019-11-04 PROCEDURE — 99284 EMERGENCY DEPT VISIT MOD MDM: CPT | Performed by: EMERGENCY MEDICINE

## 2019-11-04 PROCEDURE — 80053 COMPREHEN METABOLIC PANEL: CPT

## 2019-11-04 PROCEDURE — 3008F BODY MASS INDEX DOCD: CPT | Performed by: PHYSICIAN ASSISTANT

## 2019-11-04 PROCEDURE — 96366 THER/PROPH/DIAG IV INF ADDON: CPT

## 2019-11-04 PROCEDURE — 85027 COMPLETE CBC AUTOMATED: CPT

## 2019-11-04 RX ORDER — KETOROLAC TROMETHAMINE 30 MG/ML
15 INJECTION, SOLUTION INTRAMUSCULAR; INTRAVENOUS ONCE
Status: COMPLETED | OUTPATIENT
Start: 2019-11-04 | End: 2019-11-04

## 2019-11-04 RX ORDER — MAGNESIUM SULFATE HEPTAHYDRATE 40 MG/ML
2 INJECTION, SOLUTION INTRAVENOUS ONCE
Status: COMPLETED | OUTPATIENT
Start: 2019-11-04 | End: 2019-11-04

## 2019-11-04 RX ORDER — METOCLOPRAMIDE HYDROCHLORIDE 5 MG/ML
5 INJECTION INTRAMUSCULAR; INTRAVENOUS ONCE
Status: COMPLETED | OUTPATIENT
Start: 2019-11-04 | End: 2019-11-04

## 2019-11-04 RX ORDER — DIPHENHYDRAMINE HYDROCHLORIDE 50 MG/ML
50 INJECTION INTRAMUSCULAR; INTRAVENOUS ONCE
Status: COMPLETED | OUTPATIENT
Start: 2019-11-04 | End: 2019-11-04

## 2019-11-04 RX ORDER — RIZATRIPTAN BENZOATE 10 MG/1
10 TABLET, ORALLY DISINTEGRATING ORAL ONCE AS NEEDED
Qty: 9 TABLET | Refills: 0 | Status: SHIPPED | OUTPATIENT
Start: 2019-11-04 | End: 2020-05-12 | Stop reason: ALTCHOICE

## 2019-11-04 RX ADMIN — KETOROLAC TROMETHAMINE 15 MG: 30 INJECTION, SOLUTION INTRAMUSCULAR; INTRAVENOUS at 19:24

## 2019-11-04 RX ADMIN — MAGNESIUM SULFATE HEPTAHYDRATE 2 G: 40 INJECTION, SOLUTION INTRAVENOUS at 19:29

## 2019-11-04 RX ADMIN — METOCLOPRAMIDE 5 MG: 5 INJECTION, SOLUTION INTRAMUSCULAR; INTRAVENOUS at 19:27

## 2019-11-04 RX ADMIN — DIPHENHYDRAMINE HYDROCHLORIDE 50 MG: 50 INJECTION INTRAMUSCULAR; INTRAVENOUS at 19:24

## 2019-11-04 NOTE — ED PROVIDER NOTES
History  Chief Complaint   Patient presents with    Headache     headache for two weeks, no photosensitivity or dizziness     59-year-old female presents for 2 weeks of headache  The headache started gradually and has become progressively worse over the last 2 weeks  The patient denies associated fevers and chills  History provided by:  Parent and patient   used: No    Headache   Pain location:  Generalized  Quality:  Sharp  Radiates to:  Does not radiate  Severity currently:  9/10  Severity at highest:  9/10  Onset quality:  Gradual  Duration:  2 weeks  Timing:  Constant  Progression:  Waxing and waning  Chronicity:  Recurrent  Similar to prior headaches: yes    Context: bright light, emotional stress and loud noise    Worsened by: Activity, light and sound  Ineffective treatments:  Acetaminophen and NSAIDs  Associated symptoms: eye pain and photophobia    Associated symptoms: no abdominal pain, no congestion, no fever, no nausea, no neck pain, no neck stiffness and no sinus pressure    Risk factors: no family hx of SAH    Headache - Recurrent or Known Dx Migraines   Associated symptoms: eye pain and photophobia    Associated symptoms: no abdominal pain, no congestion, no fever, no nausea, no neck pain, no neck stiffness and no sinus pressure        Prior to Admission Medications   Prescriptions Last Dose Informant Patient Reported? Taking?    Multiple Vitamins-Minerals (MULTIVITAL) tablet  Self Yes No   Sig: Take 1 tablet by mouth daily   citalopram (CeleXA) 20 mg tablet   No No   Sig: Take 1 5 tablets (30 mg total) by mouth daily   rizatriptan (MAXALT-MLT) 10 MG disintegrating tablet   No No   Sig: Take 1 tablet (10 mg total) by mouth once as needed for migraine for up to 1 dose May repeat in 2 hours if needed   valACYclovir (VALTREX) 1,000 mg tablet   No No   Sig: Two tablets every 12 hours for 1 day      Facility-Administered Medications: None       Past Medical History:   Diagnosis Date    Acute appendicitis with localized peritonitis     Acute otitis media, right 3/23/2019    Anxiety     Anxiety and depression     Last Assessed: 2017    Depression     No meds currently  Last Assessed: 2015    Dysuria     Last Assessed: 2016    Endometriosis     Last Assessed: 2015    Fatigue     Last Assessed: 2015    Granulation tissue at vaginal vault     Last Assessed: 2017    Irregular menses     Migraines     Pelvic pain in female     Left pelvic area   Trichomoniasis     Resolved: 2017     Vaginal spotting     Last Assessed: 2017       Past Surgical History:   Procedure Laterality Date    APPENDECTOMY  10/06/2016    HYSTERECTOMY      LAPAROSCOPIC ENDOMETRIOSIS FULGURATION  2012    PELVIC LAPAROSCOPY      GA LAP,APPENDECTOMY N/A 10/6/2016    Procedure: APPENDECTOMY LAPAROSCOPIC;  Surgeon: Nelsy Alcantara MD;  Location: [facility];  Service: General    GA LAPAROSCOPY W TOT HYSTERECTUTERUS <=250 Renelda Caller TUBE/OVARY N/A 2016    Procedure: HYSTERECTOMY LAPAROSCOPIC TOTAL  LSO  right salpingectomy  diagnostic cystoscopy;  Surgeon: Victor Hugo De Guzman MD;  Location: [facility];  Service: Gynecology    TUBAL LIGATION  2015    VAGINAL HYSTERECTOMY      Last Assessed: 2016       Family History   Problem Relation Age of Onset    Thyroid disease Mother     Sleep apnea Mother     Parkinsonism Paternal Grandfather      I have reviewed and agree with the history as documented  Social History     Tobacco Use    Smoking status: Former Smoker     Years: 5 00     Types: Cigarettes     Last attempt to quit: 2013     Years since quittin 9    Smokeless tobacco: Never Used    Tobacco comment: Quit 5 years ago  Smoked 5 cigarettes daily   Substance Use Topics    Alcohol use: Yes     Frequency: 2-4 times a month     Comment: Socially    Drug use: No        Review of Systems   Constitutional: Negative for chills and fever     HENT: Negative for congestion and sinus pressure  Eyes: Positive for photophobia and pain  Gastrointestinal: Negative for abdominal pain and nausea  Genitourinary: Negative for dysuria and flank pain  Musculoskeletal: Negative for neck pain and neck stiffness  Neurological: Positive for headaches  All other systems reviewed and are negative  Physical Exam  Physical Exam   Constitutional: She is oriented to person, place, and time  She appears well-developed and well-nourished  HENT:   Head: Normocephalic and atraumatic  Eyes: Pupils are equal, round, and reactive to light  EOM are normal    Neck: Normal range of motion  Neck supple  Cardiovascular: Normal rate and regular rhythm  Pulmonary/Chest: Effort normal and breath sounds normal    Abdominal: Soft  Bowel sounds are normal    Musculoskeletal: Normal range of motion  She exhibits no deformity  Neurological: She is alert and oriented to person, place, and time  Skin: Skin is warm and dry  Capillary refill takes less than 2 seconds  Psychiatric: She has a normal mood and affect  Her behavior is normal  Judgment and thought content normal    Nursing note and vitals reviewed        Vital Signs  ED Triage Vitals [11/04/19 1839]   Temperature Pulse Respirations Blood Pressure SpO2   98 8 °F (37 1 °C) 72 18 124/84 98 %      Temp Source Heart Rate Source Patient Position - Orthostatic VS BP Location FiO2 (%)   Temporal Monitor Sitting Left arm --      Pain Score       9           Vitals:    11/04/19 1839   BP: 124/84   Pulse: 72   Patient Position - Orthostatic VS: Sitting         Visual Acuity      ED Medications  Medications - No data to display    Diagnostic Studies  Results Reviewed     None                 No orders to display              Procedures  Procedures       ED Course  ED Course as of Nov 04 2023   Mon Nov 04, 2019 2021 The patient is feeling better after pain medication                                  MDM  Number of Diagnoses or Management Options  Diagnosis management comments: Acute migraine headache       Amount and/or Complexity of Data Reviewed  Obtain history from someone other than the patient: yes (Patient's mother)  Review and summarize past medical records: yes    Risk of Complications, Morbidity, and/or Mortality  Presenting problems: high  Diagnostic procedures: high  Management options: high    Patient Progress  Patient progress: stable      Disposition  Final diagnoses:   None     ED Disposition     None      Follow-up Information    None         Patient's Medications   Discharge Prescriptions    No medications on file     No discharge procedures on file      ED Provider  Electronically Signed by           Laila Cardoza MD  11/04/19 2024

## 2019-11-04 NOTE — PROGRESS NOTES
Assessment/Plan:    Problem List Items Addressed This Visit     None      Visit Diagnoses     Intractable headache, unspecified chronicity pattern, unspecified headache type    -  Primary    Relevant Medications    rizatriptan (MAXALT-MLT) 10 MG disintegrating tablet    Other Relevant Orders    MRI brain w wo contrast    CBC (Completed)    Comprehensive metabolic panel (Completed)    Lyme Antibody Profile with reflex to WB    TSH, 3rd generation with Free T4 reflex (Completed)           Diagnoses and all orders for this visit:    Intractable headache, unspecified chronicity pattern, unspecified headache type  -     rizatriptan (MAXALT-MLT) 10 MG disintegrating tablet; Take 1 tablet (10 mg total) by mouth once as needed for migraine for up to 1 dose May repeat in 2 hours if needed  -     MRI brain w wo contrast; Future  -     CBC; Future  -     Comprehensive metabolic panel; Future  -     Lyme Antibody Profile with reflex to WB; Future  -     TSH, 3rd generation with Free T4 reflex; Future        Will check labs as well as imaging  Try changing Imitrex to Maxalt  If symptoms worsen, to ER for pain relief  Subjective:      Patient ID: Bela Guillen is a 29 y o  female  Latrell Reasoner returns today complaining of persistent headache since seeing me 10/30/19  She did not get any relief with Toradol injection, nor any with the Imitrex  In fact, she feels her HA as gotten worse  It is constant instead of coming and going as before  Otherwise, no new/additional symptoms  The following portions of the patient's history were reviewed and updated as appropriate:   She has a past medical history of Acute otitis media, right (3/23/2019), Anxiety, Anxiety and depression, Depression, Dysuria, Endometriosis, Fatigue, Granulation tissue at vaginal vault, Irregular menses, Migraines, Pelvic pain in female, Trichomoniasis, and Vaginal spotting ,  does not have any pertinent problems on file  ,   has a past surgical history that includes Laparoscopy; Tubal ligation (03/2015); Hysterectomy; pr lap,appendectomy (N/A, 10/6/2016); pr laparoscopy w tot hysterectuterus <=250 gram  w tube/ovary (N/A, 7/25/2016); Appendectomy (10/06/2016); Laparoscopic endometriosis fulguration (2012); and Vaginal hysterectomy  ,  family history includes Parkinsonism in her paternal grandfather; Sleep apnea in her mother; Thyroid disease in her mother  ,   reports that she quit smoking about 5 years ago  Her smoking use included cigarettes  She quit after 5 00 years of use  She has never used smokeless tobacco  She reports that she drinks alcohol  She reports that she does not use drugs  ,  has No Known Allergies     Current Outpatient Medications   Medication Sig Dispense Refill    citalopram (CeleXA) 20 mg tablet Take 1 5 tablets (30 mg total) by mouth daily 45 tablet 2    Multiple Vitamins-Minerals (MULTIVITAL) tablet Take 1 tablet by mouth daily      valACYclovir (VALTREX) 1,000 mg tablet Two tablets every 12 hours for 1 day 20 tablet 0    rizatriptan (MAXALT-MLT) 10 MG disintegrating tablet Take 1 tablet (10 mg total) by mouth once as needed for migraine for up to 1 dose May repeat in 2 hours if needed 9 tablet 0     No current facility-administered medications for this visit  Review of Systems   Constitutional: Negative for activity change, appetite change, chills, diaphoresis, fatigue, fever and unexpected weight change  HENT: Negative for congestion, ear pain, postnasal drip, rhinorrhea, sinus pressure, sinus pain, sneezing, sore throat, tinnitus and voice change  Eyes: Positive for photophobia  Negative for pain, redness and visual disturbance  Respiratory: Negative for cough, chest tightness, shortness of breath and wheezing  Cardiovascular: Negative for chest pain, palpitations and leg swelling  Gastrointestinal: Negative for abdominal pain, blood in stool, constipation, diarrhea, nausea and vomiting     Genitourinary: Negative for difficulty urinating, dysuria, frequency, hematuria and urgency  Musculoskeletal: Negative for arthralgias, back pain, gait problem, joint swelling, myalgias, neck pain and neck stiffness  Skin: Negative for color change, pallor, rash and wound  Neurological: Positive for headaches  Negative for dizziness, tremors, weakness and light-headedness  Psychiatric/Behavioral: Negative for dysphoric mood, self-injury, sleep disturbance and suicidal ideas  The patient is not nervous/anxious  Objective:  Vitals:    11/04/19 1555   BP: 110/84   BP Location: Left arm   Patient Position: Sitting   Weight: 69 2 kg (152 lb 9 6 oz)   Height: 5' 7" (1 702 m)     Body mass index is 23 9 kg/m²  Physical Exam   Constitutional: She is oriented to person, place, and time  She appears well-developed and well-nourished  She appears distressed (in dim room, appears to be in pain)  HENT:   Head: Normocephalic and atraumatic  Right Ear: Hearing, tympanic membrane, external ear and ear canal normal    Left Ear: Hearing, tympanic membrane, external ear and ear canal normal    Mouth/Throat: Uvula is midline, oropharynx is clear and moist and mucous membranes are normal  No oropharyngeal exudate  Eyes: Conjunctivae are normal  Right eye exhibits no discharge  Left eye exhibits no discharge  No scleral icterus  Neck: Normal range of motion  Neck supple  Carotid bruit is not present  No thyromegaly present  No nuchal rigidity   Cardiovascular: Normal rate, regular rhythm and normal heart sounds  No murmur heard  Pulmonary/Chest: Effort normal and breath sounds normal  No respiratory distress  She has no wheezes  Abdominal: Soft  Bowel sounds are normal  She exhibits no distension and no mass  There is no tenderness  There is no rebound and no guarding  Musculoskeletal: Normal range of motion  She exhibits no edema or tenderness  Lymphadenopathy:     She has no cervical adenopathy     Neurological: She is alert and oriented to person, place, and time  Skin: Skin is warm and dry  No rash noted  She is not diaphoretic  No erythema  Psychiatric: She has a normal mood and affect  Her behavior is normal  Judgment and thought content normal    Vitals reviewed

## 2019-11-05 ENCOUNTER — TELEPHONE (OUTPATIENT)
Dept: FAMILY MEDICINE CLINIC | Facility: CLINIC | Age: 29
End: 2019-11-05

## 2019-11-05 DIAGNOSIS — R51.9 INTRACTABLE HEADACHE, UNSPECIFIED CHRONICITY PATTERN, UNSPECIFIED HEADACHE TYPE: Primary | ICD-10-CM

## 2019-11-05 NOTE — TELEPHONE ENCOUNTER
Patient called to request a referral to a Neurologist   Patient reports she went to the ER last night and was prescribed pain medication but states she still has no relief

## 2019-11-06 ENCOUNTER — TELEPHONE (OUTPATIENT)
Dept: FAMILY MEDICINE CLINIC | Facility: CLINIC | Age: 29
End: 2019-11-06

## 2019-11-06 LAB — B BURGDOR IGG+IGM SER-ACNC: <0.91 ISR (ref 0–0.9)

## 2019-11-07 ENCOUNTER — TELEPHONE (OUTPATIENT)
Dept: NEUROLOGY | Facility: CLINIC | Age: 29
End: 2019-11-07

## 2019-11-07 ENCOUNTER — HOSPITAL ENCOUNTER (OUTPATIENT)
Dept: MRI IMAGING | Facility: HOSPITAL | Age: 29
Discharge: HOME | End: 2019-11-07
Payer: COMMERCIAL

## 2019-11-07 DIAGNOSIS — R51.9 INTRACTABLE HEADACHE, UNSPECIFIED CHRONICITY PATTERN, UNSPECIFIED HEADACHE TYPE: ICD-10-CM

## 2019-11-07 PROCEDURE — A9585 GADOBUTROL INJECTION: HCPCS | Performed by: PHYSICIAN ASSISTANT

## 2019-11-07 PROCEDURE — 70553 MRI BRAIN STEM W/O & W/DYE: CPT

## 2019-11-07 RX ADMIN — GADOBUTROL 6 ML: 604.72 INJECTION INTRAVENOUS at 15:05

## 2019-11-08 ENCOUNTER — CONSULT (OUTPATIENT)
Dept: NEUROLOGY | Facility: CLINIC | Age: 29
End: 2019-11-08
Payer: COMMERCIAL

## 2019-11-08 VITALS
HEART RATE: 66 BPM | BODY MASS INDEX: 23.67 KG/M2 | SYSTOLIC BLOOD PRESSURE: 108 MMHG | DIASTOLIC BLOOD PRESSURE: 72 MMHG | HEIGHT: 67 IN | WEIGHT: 150.8 LBS | RESPIRATION RATE: 18 BRPM

## 2019-11-08 DIAGNOSIS — G43.009 MIGRAINE WITHOUT AURA AND WITHOUT STATUS MIGRAINOSUS, NOT INTRACTABLE: Primary | ICD-10-CM

## 2019-11-08 DIAGNOSIS — R51.9 INTRACTABLE HEADACHE, UNSPECIFIED CHRONICITY PATTERN, UNSPECIFIED HEADACHE TYPE: ICD-10-CM

## 2019-11-08 PROCEDURE — 99243 OFF/OP CNSLTJ NEW/EST LOW 30: CPT | Performed by: PSYCHIATRY & NEUROLOGY

## 2019-11-08 RX ORDER — METHYLPREDNISOLONE 4 MG/1
TABLET ORAL
Qty: 1 TABLET | Refills: 1 | Status: SHIPPED | OUTPATIENT
Start: 2019-11-08 | End: 2020-04-09

## 2019-11-08 RX ORDER — TOPIRAMATE 25 MG/1
TABLET ORAL
Qty: 90 TABLET | Refills: 2 | Status: SHIPPED | OUTPATIENT
Start: 2019-11-08 | End: 2020-01-21 | Stop reason: SDUPTHER

## 2019-11-08 NOTE — PROGRESS NOTES
Patient ID: Bela Guillen is a 29 y o  female  Assessment/Plan:    Migraine without aura and without status migrainosus, not intractable  Unremarkable general neurologic examination  MRI brain unremarkable except for changes in the maxillary sinuses  Gives a history typical for migraine without aura dating back 3-4 years  Through the years occurring fairly frequently and in the recent year so on a weekly basis  Currently with a sustained headache present now for a week  Did not respond to Toradol injection  --in attempt to break her current headache issue, steroid burst with Medrol Dosepak  --simultaneously begin topiramate (no history of glaucoma or kidney stones) as a daily preventative medication starting with 25 mg tablets 1 at bedtime nightly for 1 week followed by 1 twice daily  --instructed to drink extra fluids while on topiramate  --to maintain a headache diary  --given a series of handouts for her review detail link migraine triggers, dietary non dietary, medication overuse headache, over-the-counter agents available for cyst, etc   --have asked that she call the office in 2-3 weeks with a status report and to discuss possible further changes  She will call before then should she have questions or concerns  She will follow up in 8-10 weeks  Subjective:    HPI  Patient, 29years of age and right-handed, presents for further evaluation regarding her headaches  She was accompanied by good friend, Miguelia  She relates the onset of her headaches to approximately 3-4 years ago  Headaches for the most part stereotyped  Holocranial   Sharp and pulsatile discomfort ranging from moderate to at times severe  Describes sounds as bothersome during her headaches and does have occasional nausea  Untreated her headaches would persist for the better part of a day  Headaches unassociated with aura    Frequency is been that of perhaps 1 a week over the past year so, and her current headache has been unremitting over the past 7-8 days  Did not have significant benefit from trip to the ED with the cocktail including Toradol  Did not respond to triptans  A very strong family history of migraine on her father's side  Just had an MRI brain performed  Other than some bilateral maxillary sinus changes was unremarkable  Recent blood work includes CBC with hemoglobin 13 8, hematocrit 40 8, white count 7 60 platelet count 907, CMP with creatinine 0 79, AST 9 and ALT 14, TSH normal at 1 888 and a negative Lyme serology  Past Medical History:   Diagnosis Date    Acute appendicitis with localized peritonitis     Acute otitis media, right 3/23/2019    Anxiety     Anxiety and depression     Last Assessed: 4/17/2017    Depression     No meds currently  Last Assessed: 9/24/2015    Dysuria     Last Assessed: 8/1/2016    Endometriosis     Last Assessed: 1/23/2015    Fatigue     Last Assessed: 9/24/2015    Granulation tissue at vaginal vault     Last Assessed: 2/6/2017    Irregular menses     Migraines     Pelvic pain in female     Left pelvic area      Trichomoniasis     Resolved: 5/18/2017     Vaginal spotting     Last Assessed: 1/30/2017     Past Surgical History:   Procedure Laterality Date    APPENDECTOMY  10/06/2016    HYSTERECTOMY      LAPAROSCOPIC ENDOMETRIOSIS FULGURATION  2012    PELVIC LAPAROSCOPY      SC LAP,APPENDECTOMY N/A 10/6/2016    Procedure: APPENDECTOMY LAPAROSCOPIC;  Surgeon: Jeannie Peacock MD;  Location: [facility];  Service: General    SC LAPAROSCOPY W TOT HYSTERECTUTERUS <=[address] TUBE/OVARY N/A 7/25/2016    Procedure: HYSTERECTOMY LAPAROSCOPIC TOTAL  LSO  right salpingectomy  diagnostic cystoscopy;  Surgeon: Sonny Segura MD;  Location[facility];  Service: Gynecology    TUBAL LIGATION  03/2015    VAGINAL HYSTERECTOMY      Last Assessed: 9/16/2016     Social History     Socioeconomic History    Marital status:      Spouse name: None    Number of children: None    Years of education: None    Highest education level: None   Occupational History    None   Social Needs    Financial resource strain: None    Food insecurity:     Worry: None     Inability: None    Transportation needs:     Medical: None     Non-medical: None   Tobacco Use    Smoking status: Former Smoker     Years:      Types: Cigarettes     Last attempt to quit: 2013     Years since quittin 9    Smokeless tobacco: Never Used    Tobacco comment: Quit 5 years ago   Smoked 5 cigarettes daily   Substance and Sexual Activity    Alcohol use: Yes     Frequency: 2-4 times a month     Comment: Socially    Drug use: No    Sexual activity: Yes     Partners: Male   Lifestyle    Physical activity:     Days per week: None     Minutes per session: None    Stress: None   Relationships    Social connections:     Talks on phone: None     Gets together: None     Attends [employer] service: None     Active member of club or organization: None     Attends meetings of clubs or organizations: None     Relationship status: None    Intimate partner violence:     Fear of current or ex partner: None     Emotionally abused: None     Physically abused: None     Forced sexual activity: None   Other Topics Concern    None   Social History Narrative    No living will    Marital Problem - going through divorce     Family History   Problem Relation Age of Onset    Thyroid disease Mother     Sleep apnea Mother     Parkinsonism Paternal Grandfather      No Known Allergies    Current Outpatient Medications:     citalopram (CeleXA) 20 mg tablet, Take 1 5 tablets (30 mg total) by mouth daily, Disp: 45 tablet, Rfl: 2    Multiple Vitamins-Minerals (MULTIVITAL) tablet, Take 1 tablet by mouth daily, Disp: , Rfl:     rizatriptan (MAXALT-MLT) 10 MG disintegrating tablet, Take 1 tablet (10 mg total) by mouth once as needed for migraine for up to 1 dose May repeat in 2 hours if needed, Disp: 9 tablet, Rfl: 0   methylPREDNISolone 4 MG tablet therapy pack, Use as directed on package, Disp: 1 tablet, Rfl: 1    topiramate (TOPAMAX) 25 mg tablet, By mouth take 1 tablet in the morning and 2 tablets in the evening daily or as directed, Disp: 90 tablet, Rfl: 2    valACYclovir (VALTREX) 1,000 mg tablet, Two tablets every 12 hours for 1 day, Disp: 20 tablet, Rfl: 0  No current facility-administered medications for this visit  Objective:    Blood pressure 108/72, pulse 66, resp  rate 18, height 5' 7" (1 702 m), weight 68 4 kg (150 lb 12 8 oz), not currently breastfeeding  Physical Exam  Head normocephalic  Eyes nonicteric  No audible head, ocular or anterior neck bruits  Lungs clear to auscultation  Rhythm regular  GI (abdomen) soft nontender  Bowel sounds present  No significant lower extremity edema  Neurological Exam  Alert  Pleasantly interactive  Fully oriented  No dysarthria  Unremarkable spontaneous gait  Romberg maneuver performed unremarkably  Able to tandem walk  Cranial Nerves:   I: Olfactory sense intact bilaterally  II: Visual fields full to confrontation  Pupils equal, round, reactive to light with normal accomodation  Fundus: with bilaterally marginated discs and spontaneous venous pulsations  III,IV,VI: Extraocular muscles EOMI, no nystagmus  V: Masseter and pterygoid strength  Sensation in the V1 through V3 distributions intact to pinprick and light touch bilaterally  VII: Face is symmetric with no weakness noted  VIII: Audition intact to finger rub bilaterally  IX/X: Uvula midline  Soft palate elevation symmetric  XI: Trapezius and SCM strength 5/5 bilaterally  XII: Tongue midline with no atrophy or fasciculations with appropriate movement  Accurate finger-to-nose and heel-to-shin maneuvers bilaterally  Full symmetrical strength throughout the 4 extremities with no upper extremity drift  Sensory testing grossly intact to pin, position and vibration throughout    No extinction with double simultaneous stimulation  Muscle stretch reflexes bilaterally 2 at biceps and triceps, bilaterally 1 at brachioradialis and bilaterally 2 at knees and ankles  Toe response downgoing bilaterally  Superficial abdominal reflexes present in all 4 quadrants  ROS:    Review of Systems   Constitutional: Negative  Negative for appetite change and fever  HENT: Negative  Negative for hearing loss, tinnitus (bilateral ears), trouble swallowing and voice change  Eyes: Negative  Negative for photophobia and pain  Respiratory: Negative  Negative for shortness of breath  Cardiovascular: Negative  Negative for palpitations  Gastrointestinal: Negative  Negative for nausea and vomiting  Endocrine: Negative  Negative for cold intolerance and heat intolerance  Genitourinary: Negative  Negative for dysuria, frequency and urgency  Musculoskeletal: Negative  Negative for myalgias and neck pain  Skin: Negative  Negative for rash  Allergic/Immunologic: Negative  Neurological: Positive for dizziness, tremors (left hand), light-headedness and headaches  Negative for seizures, syncope, facial asymmetry, speech difficulty, weakness and numbness  Hematological: Bruises/bleeds easily  Psychiatric/Behavioral: Positive for sleep disturbance  Negative for confusion and hallucinations  The patient is nervous/anxious  I personally reviewed the ROS that was entered by the medical assistant  *Please note this document was created using voice recognition software and may contain sound-alike word errors  *

## 2019-11-08 NOTE — PATIENT INSTRUCTIONS
Follow the instructions on your Medrol Dosepak  At the same time start to topiramate using 25 mg tablets beginning with 1 tablet at bedtime nightly for 1 week followed by 1 tablet twice daily  Drink extra fluids during the day while on topiramate  Please keep a headache diary  Call in 2-3 weeks with a status report  Please review your handouts

## 2019-11-08 NOTE — LETTER
November 8, 2019     Ila Zhang PA-C  [address] [facility] [address]  [facility] [place] [id number]    Patient: Geni Bradley   YOB: 1990   Date of Visit: 11/8/2019       Dear Dr Nancy Moore: Thank you for referring William Boone to me for evaluation  Below are my notes for this consultation  If you have questions, please do not hesitate to call me  I look forward to following your patient along with you  Sincerely,        Vinh Godfrey MD        CC: No Recipients  Vinh Godfrey MD  11/8/2019 10:55 AM  Sign at close encounter  Patient ID: Geni Bradley is a 29 y o  female  Assessment/Plan:    Migraine without aura and without status migrainosus, not intractable  Unremarkable general neurologic examination  MRI brain unremarkable except for changes in the maxillary sinuses  Gives a history typical for migraine without aura dating back 3-4 years  Through the years occurring fairly frequently and in the recent year so on a weekly basis  Currently with a sustained headache present now for a week  Did not respond to Toradol injection  --in attempt to break her current headache issue, steroid burst with Medrol Dosepak  --simultaneously begin topiramate (no history of glaucoma or kidney stones) as a daily preventative medication starting with 25 mg tablets 1 at bedtime nightly for 1 week followed by 1 twice daily  --instructed to drink extra fluids while on topiramate  --to maintain a headache diary  --given a series of handouts for her review detail link migraine triggers, dietary non dietary, medication overuse headache, over-the-counter agents available for cyst, etc   --have asked that she call the office in 2-3 weeks with a status report and to discuss possible further changes  She will call before then should she have questions or concerns  She will follow up in 8-10 weeks      Subjective:    HPI  Patient, 29years of age and right-handed, presents for further evaluation regarding her headaches  She was accompanied by good friend, Delta  She relates the onset of her headaches to approximately 3-4 years ago  Headaches for the most part stereotyped  Holocranial   Sharp and pulsatile discomfort ranging from moderate to at times severe  Describes sounds as bothersome during her headaches and does have occasional nausea  Untreated her headaches would persist for the better part of a day  Headaches unassociated with aura  Frequency is been that of perhaps 1 a week over the past year so, and her current headache has been unremitting over the past 7-8 days  Did not have significant benefit from trip to the ED with the cocktail including Toradol  Did not respond to triptans  A very strong family history of migraine on her father's side  Just had an MRI brain performed  Other than some bilateral maxillary sinus changes was unremarkable  Recent blood work includes CBC with hemoglobin 13 8, hematocrit 40 8, white count 7 60 platelet count 817, CMP with creatinine 0 79, AST 9 and ALT 14, TSH normal at 1 888 and a negative Lyme serology  Past Medical History:   Diagnosis Date    Acute appendicitis with localized peritonitis     Acute otitis media, right 3/23/2019    Anxiety     Anxiety and depression     Last Assessed: 4/17/2017    Depression     No meds currently  Last Assessed: 9/24/2015    Dysuria     Last Assessed: 8/1/2016    Endometriosis     Last Assessed: 1/23/2015    Fatigue     Last Assessed: 9/24/2015    Granulation tissue at vaginal vault     Last Assessed: 2/6/2017    Irregular menses     Migraines     Pelvic pain in female     Left pelvic area      Trichomoniasis     Resolved: 5/18/2017     Vaginal spotting     Last Assessed: 1/30/2017     Past Surgical History:   Procedure Laterality Date    APPENDECTOMY  10/06/2016    HYSTERECTOMY      LAPAROSCOPIC ENDOMETRIOSIS FULGURATION  2012    PELVIC LAPAROSCOPY      PA LAP,APPENDECTOMY N/A 10/6/2016    Procedure: APPENDECTOMY LAPAROSCOPIC;  Surgeon: Patrick Brady MD;  [facility];  Service: General    VT LAPAROSCOPY W TOT HYSTERECTUTERUS <=250 GR[address]  W TUBE/OVARY N/A 2016    Procedure: HYSTERECTOMY LAPAROSCOPIC TOTAL  LSO  right salpingectomy  diagnostic cystoscopy;  Surgeon: Aaron Rivas MD;  [facility];  Service: Gynecology    TUBAL LIGATION  2015    VAGINAL HYSTERECTOMY      Last Assessed: 2016     Social History     Socioeconomic History    Marital status:      Spouse name: None    Number of children: None    Years of education: None    Highest education level: None   Occupational History    None   Social Needs    Financial resource strain: None    Food insecurity:     Worry: None     Inability: None    Transportation needs:     Medical: None     Non-medical: None   Tobacco Use    Smoking status: Former Smoker     Years:      Types: Cigarettes     Last attempt to quit: 2013     Years since quittin 9    Smokeless tobacco: Never Used    Tobacco comment: Quit 5 years ago   Smoked 5 cigarettes daily   Substance and Sexual Activity    Alcohol use: Yes     Frequency: 2-4 times a month     Comment: Socially    Drug use: No    Sexual activity: Yes     Partners: Male   Lifestyle    Physical activity:     Days per week: None     Minutes per session: None    Stress: None   Relationships    Social connections:     Talks on phone: None     Gets together: None     Attends [employer] service: None     Active member of club or organization: None     Attends meetings of clubs or organizations: None     Relationship status: None    Intimate partner violence:     Fear of current or ex partner: None     Emotionally abused: None     Physically abused: None     Forced sexual activity: None   Other Topics Concern    None   Social History Narrative    No living will    Marital Problem - going through divorce     Family History   Problem Relation Age of Onset    Thyroid disease Mother     Sleep apnea Mother     Parkinsonism Paternal Grandfather      No Known Allergies    Current Outpatient Medications:     citalopram (CeleXA) 20 mg tablet, Take 1 5 tablets (30 mg total) by mouth daily, Disp: 45 tablet, Rfl: 2    Multiple Vitamins-Minerals (MULTIVITAL) tablet, Take 1 tablet by mouth daily, Disp: , Rfl:     rizatriptan (MAXALT-MLT) 10 MG disintegrating tablet, Take 1 tablet (10 mg total) by mouth once as needed for migraine for up to 1 dose May repeat in 2 hours if needed, Disp: 9 tablet, Rfl: 0    methylPREDNISolone 4 MG tablet therapy pack, Use as directed on package, Disp: 1 tablet, Rfl: 1    topiramate (TOPAMAX) 25 mg tablet, By mouth take 1 tablet in the morning and 2 tablets in the evening daily or as directed, Disp: 90 tablet, Rfl: 2    valACYclovir (VALTREX) 1,000 mg tablet, Two tablets every 12 hours for 1 day, Disp: 20 tablet, Rfl: 0  No current facility-administered medications for this visit  Objective:    Blood pressure 108/72, pulse 66, resp  rate 18, height 5' 7" (1 702 m), weight 68 4 kg (150 lb 12 8 oz), not currently breastfeeding  Physical Exam  Head normocephalic  Eyes nonicteric  No audible head, ocular or anterior neck bruits  Lungs clear to auscultation  Rhythm regular  GI (abdomen) soft nontender  Bowel sounds present  No significant lower extremity edema  Neurological Exam  Alert  Pleasantly interactive  Fully oriented  No dysarthria  Unremarkable spontaneous gait  Romberg maneuver performed unremarkably  Able to tandem walk  Cranial Nerves:   I: Olfactory sense intact bilaterally  II: Visual fields full to confrontation  Pupils equal, round, reactive to light with normal accomodation  Fundus: with bilaterally marginated discs and spontaneous venous pulsations  III,IV,VI: Extraocular muscles EOMI, no nystagmus  V: [place]eter and pterygoid strength   Sensation in the V1 through V3 distributions intact to pinprick and light touch bilaterally  VII: Face is symmetric with no weakness noted  VIII: Audition intact to finger rub bilaterally  IX/X: Uvula midline  Soft palate elevation symmetric  XI: Trapezius and SCM strength 5/5 bilaterally  XII: Tongue midline with no atrophy or fasciculations with appropriate movement  Accurate finger-to-nose and heel-to-shin maneuvers bilaterally  Full symmetrical strength throughout the 4 extremities with no upper extremity drift  Sensory testing grossly intact to pin, position and vibration throughout  No extinction with double simultaneous stimulation  Muscle stretch reflexes bilaterally 2 at biceps and triceps, bilaterally 1 at brachioradialis and bilaterally 2 at knees and ankles  Toe response downgoing bilaterally  Superficial abdominal reflexes present in all 4 quadrants  ROS:    Review of Systems   Constitutional: Negative  Negative for appetite change and fever  HENT: Negative  Negative for hearing loss, tinnitus (bilateral ears), trouble swallowing and voice change  Eyes: Negative  Negative for photophobia and pain  Respiratory: Negative  Negative for shortness of breath  Cardiovascular: Negative  Negative for palpitations  Gastrointestinal: Negative  Negative for nausea and vomiting  Endocrine: Negative  Negative for cold intolerance and heat intolerance  Genitourinary: Negative  Negative for dysuria, frequency and urgency  Musculoskeletal: Negative  Negative for myalgias and neck pain  Skin: Negative  Negative for rash  Allergic/Immunologic: Negative  Neurological: Positive for dizziness, tremors (left hand), light-headedness and headaches  Negative for seizures, syncope, facial asymmetry, speech difficulty, weakness and numbness  Hematological: Bruises/bleeds easily  Psychiatric/Behavioral: Positive for sleep disturbance  Negative for confusion and hallucinations  The patient is nervous/anxious          I personally reviewed the ROS that was entered by the medical assistant  *Please note this document was created using voice recognition software and may contain sound-alike word errors  *

## 2019-11-08 NOTE — ASSESSMENT & PLAN NOTE
Unremarkable general neurologic examination  MRI brain unremarkable except for changes in the maxillary sinuses  Gives a history typical for migraine without aura dating back 3-4 years  Through the years occurring fairly frequently and in the recent year so on a weekly basis  Currently with a sustained headache present now for a week  Did not respond to Toradol injection  --in attempt to break her current headache issue, steroid burst with Medrol Dosepak  --simultaneously begin topiramate (no history of glaucoma or kidney stones) as a daily preventative medication starting with 25 mg tablets 1 at bedtime nightly for 1 week followed by 1 twice daily  --instructed to drink extra fluids while on topiramate  --to maintain a headache diary  --given a series of handouts for her review detail link migraine triggers, dietary non dietary, medication overuse headache, over-the-counter agents available for cyst, etc   --have asked that she call the office in 2-3 weeks with a status report and to discuss possible further changes  She will call before then should she have questions or concerns

## 2019-11-11 ENCOUNTER — TELEPHONE (OUTPATIENT)
Dept: NEUROLOGY | Facility: CLINIC | Age: 29
End: 2019-11-11

## 2019-11-11 DIAGNOSIS — J32.9 SINUSITIS, UNSPECIFIED CHRONICITY, UNSPECIFIED LOCATION: Primary | ICD-10-CM

## 2019-11-11 RX ORDER — AMOXICILLIN AND CLAVULANATE POTASSIUM 875; 125 MG/1; MG/1
1 TABLET, FILM COATED ORAL EVERY 12 HOURS SCHEDULED
Qty: 14 TABLET | Refills: 0 | Status: SHIPPED | OUTPATIENT
Start: 2019-11-11 | End: 2019-11-18

## 2019-11-11 NOTE — TELEPHONE ENCOUNTER
Patient calling stating that since starting the topiramate and steroids, she's been feeling "spacey" and states that her head feels "very clear"  She states that she feels very different, but is unable to really verbalize how she is feeling  She likens this ot feeling like a "smart drunk"  She states that she is also taking celexa, so she is unsure if it is only the new medications, or a mix of the old and new meds  Patient is asking for advice on what she should do  Please advise  [phone] okay to leave a detailed message

## 2019-11-11 NOTE — TELEPHONE ENCOUNTER
Spoke with patient  Suspect her symptomatic issues are likely from a combination of the high-dose steroids for steroid burst possibly with some input from the topiramate  Her headaches are fortunately less intense  She is to stop the steroids as of today  She will hold the topiramate and restart at 25 mg at bedtime nightly on Friday November 15th  To call the office early the following week with status update

## 2020-01-10 ENCOUNTER — TELEPHONE (OUTPATIENT)
Dept: NEUROLOGY | Facility: CLINIC | Age: 30
End: 2020-01-10

## 2020-01-10 DIAGNOSIS — G43.009 MIGRAINE WITHOUT AURA AND WITHOUT STATUS MIGRAINOSUS, NOT INTRACTABLE: Primary | ICD-10-CM

## 2020-01-10 DIAGNOSIS — G43.009 MIGRAINE WITHOUT AURA AND WITHOUT STATUS MIGRAINOSUS, NOT INTRACTABLE: ICD-10-CM

## 2020-01-10 RX ORDER — METHYLPREDNISOLONE 4 MG/1
TABLET ORAL
Qty: 1 TABLET | Refills: 0 | Status: SHIPPED | OUTPATIENT
Start: 2020-01-10 | End: 2020-01-13

## 2020-01-10 NOTE — TELEPHONE ENCOUNTER
Called and advised pt of all of the below  She verbalized clear understanding of all instructions  Pt would like to try Medrol again  Requesting rx be sent to [facility]

## 2020-01-10 NOTE — TELEPHONE ENCOUNTER
She should increase the topiramate to 25 mg tablets 2 in the morning and 2 at night  Did her sustained headache respond to the C/ [address] when it was last tried? If so could try again  Alternatively, could also consider a Toradol injection although that did not work for her on last occasion  Please let me know how she would like to proceed  Thank you

## 2020-01-10 NOTE — TELEPHONE ENCOUNTER
pt called and states that she has been doing alright but for the last 2 weeks her headaches have gotten worse  she was able to control them a week ago with ibuprofen or tylenol but now headache is constant for the last 5 days  she takes topiramate 25mg 1 tab in am and 2 tabs in pm-tolerating this dose   hugo 6-7/10   no n/v/light/sound sensitivity  states that she feels very tired  she has an appt with dar on 1/21      please advise  [phone] to leave a detailed message

## 2020-01-13 ENCOUNTER — OFFICE VISIT (OUTPATIENT)
Dept: FAMILY MEDICINE CLINIC | Facility: CLINIC | Age: 30
End: 2020-01-13
Payer: COMMERCIAL

## 2020-01-13 VITALS
HEART RATE: 66 BPM | BODY MASS INDEX: 23.13 KG/M2 | HEIGHT: 67 IN | WEIGHT: 147.4 LBS | SYSTOLIC BLOOD PRESSURE: 128 MMHG | DIASTOLIC BLOOD PRESSURE: 76 MMHG | OXYGEN SATURATION: 97 %

## 2020-01-13 DIAGNOSIS — F32.A ANXIETY AND DEPRESSION: Primary | ICD-10-CM

## 2020-01-13 DIAGNOSIS — F41.9 ANXIETY AND DEPRESSION: Primary | ICD-10-CM

## 2020-01-13 PROCEDURE — 3008F BODY MASS INDEX DOCD: CPT | Performed by: PHYSICIAN ASSISTANT

## 2020-01-13 PROCEDURE — 1036F TOBACCO NON-USER: CPT | Performed by: PHYSICIAN ASSISTANT

## 2020-01-13 PROCEDURE — 99213 OFFICE O/P EST LOW 20 MIN: CPT | Performed by: PHYSICIAN ASSISTANT

## 2020-01-13 RX ORDER — CITALOPRAM 20 MG/1
30 TABLET ORAL DAILY
Qty: 45 TABLET | Refills: 2 | Status: SHIPPED | OUTPATIENT
Start: 2020-01-13 | End: 2020-07-17 | Stop reason: SDUPTHER

## 2020-01-13 RX ORDER — ALPRAZOLAM 0.25 MG/1
0.25 TABLET ORAL
Qty: 20 TABLET | Refills: 0 | Status: SHIPPED | OUTPATIENT
Start: 2020-01-13 | End: 2021-08-31 | Stop reason: SDUPTHER

## 2020-01-13 NOTE — PROGRESS NOTES
Assessment/Plan:    Problem List Items Addressed This Visit        Other    Anxiety and depression - Primary    Relevant Medications    citalopram (CeleXA) 20 mg tablet    ALPRAZolam (XANAX) 0 25 mg tablet           Diagnoses and all orders for this visit:    Anxiety and depression  -     citalopram (CeleXA) 20 mg tablet; Take 1 5 tablets (30 mg total) by mouth daily  -     ALPRAZolam (XANAX) 0 25 mg tablet; Take 1 tablet (0 25 mg total) by mouth daily at bedtime as needed for anxiety              Subjective:      Patient ID: Geni Bradley is a 34 y o  female  Laila Black is here today to follow up for anxiety and depression  She is doing well and is stable on her medication  She is following with neurology for her migraine headaches  The following portions of the patient's history were reviewed and updated as appropriate:   She has a past medical history of Acute appendicitis with localized peritonitis, Acute otitis media, right (3/23/2019), Anxiety, Anxiety and depression, Depression, Dysuria, Endometriosis, Fatigue, Granulation tissue at vaginal vault, Irregular menses, Migraines, Pelvic pain in female, Trichomoniasis, and Vaginal spotting ,  does not have any pertinent problems on file  ,   has a past surgical history that includes Laparoscopy; Tubal ligation (03/2015); Hysterectomy; pr lap,appendectomy (N/A, 10/6/2016); pr laparoscopy w tot hysterectuterus <=250 gram  w tube/ovary (N/A, 7/25/2016); Appendectomy (10/06/2016); Laparoscopic endometriosis fulguration (2012); and Vaginal hysterectomy  ,  family history includes Parkinsonism in her paternal grandfather; Sleep apnea in her mother; Thyroid disease in her mother  ,   reports that she quit smoking about 6 years ago  Her smoking use included cigarettes  She quit after 5 00 years of use  She has never used smokeless tobacco  She reports that she drinks alcohol  She reports that she does not use drugs  ,  has No Known Allergies     Current Outpatient Medications Medication Sig Dispense Refill    methylPREDNISolone 4 MG tablet therapy pack Use as directed on package 1 tablet 1    Multiple Vitamins-Minerals (MULTIVITAL) tablet Take 1 tablet by mouth daily      rizatriptan (MAXALT-MLT) 10 MG disintegrating tablet Take 1 tablet (10 mg total) by mouth once as needed for migraine for up to 1 dose May repeat in 2 hours if needed 9 tablet 0    topiramate (TOPAMAX) 25 mg tablet By mouth take 1 tablet in the morning and 2 tablets in the evening daily or as directed 90 tablet 2    ALPRAZolam (XANAX) 0 25 mg tablet Take 1 tablet (0 25 mg total) by mouth daily at bedtime as needed for anxiety 20 tablet 0    citalopram (CeleXA) 20 mg tablet Take 1 5 tablets (30 mg total) by mouth daily 45 tablet 2    valACYclovir (VALTREX) 1,000 mg tablet Two tablets every 12 hours for 1 day 20 tablet 0     No current facility-administered medications for this visit  Review of Systems   Constitutional: Negative for activity change, appetite change, chills, diaphoresis, fatigue, fever and unexpected weight change  HENT: Negative for congestion, ear pain, postnasal drip, rhinorrhea, sinus pressure, sinus pain, sneezing, sore throat, tinnitus and voice change  Eyes: Negative for pain, redness and visual disturbance  Respiratory: Negative for cough, chest tightness, shortness of breath and wheezing  Cardiovascular: Negative for chest pain, palpitations and leg swelling  Gastrointestinal: Negative for abdominal pain, blood in stool, constipation, diarrhea, nausea and vomiting  Genitourinary: Negative for difficulty urinating, dysuria, frequency, hematuria and urgency  Musculoskeletal: Negative for arthralgias, back pain, gait problem, joint swelling, myalgias, neck pain and neck stiffness  Skin: Negative for color change, pallor, rash and wound  Neurological: Positive for headaches (follows with neurology)  Negative for dizziness, tremors, weakness and light-headedness  Psychiatric/Behavioral: Negative for dysphoric mood, self-injury, sleep disturbance and suicidal ideas  The patient is not nervous/anxious  Objective:  Vitals:    01/13/20 1409   BP: 128/76   Pulse: 66   SpO2: 97%   Weight: 66 9 kg (147 lb 6 4 oz)   Height: 5' 7" (1 702 m)     Body mass index is 23 09 kg/m²  Controlled Substance Review    [place]MP or [place]  reviewed: No red flags were identified; safe to proceed with prescription  Joe Mattaaniljose de jesus Physical Exam   Constitutional: She is oriented to person, place, and time  She appears well-developed and well-nourished  No distress  HENT:   Head: Normocephalic and atraumatic  Right Ear: Hearing, tympanic membrane, external ear and ear canal normal    Left Ear: Hearing, tympanic membrane, external ear and ear canal normal    Mouth/Throat: Uvula is midline, oropharynx is clear and moist and mucous membranes are normal  No oropharyngeal exudate  Eyes: Conjunctivae are normal  Right eye exhibits no discharge  Left eye exhibits no discharge  No scleral icterus  Neck: Neck supple  Carotid bruit is not present  No thyromegaly present  Cardiovascular: Normal rate, regular rhythm and normal heart sounds  No murmur heard  Pulmonary/Chest: Effort normal and breath sounds normal  No respiratory distress  She has no wheezes  Abdominal: Soft  Bowel sounds are normal  She exhibits no distension and no mass  There is no tenderness  There is no rebound and no guarding  Musculoskeletal: Normal range of motion  She exhibits no edema or tenderness  Lymphadenopathy:     She has no cervical adenopathy  Neurological: She is alert and oriented to person, place, and time  Skin: Skin is warm and dry  No rash noted  She is not diaphoretic  No erythema  Psychiatric: She has a normal mood and affect  Her behavior is normal  Judgment and thought content normal    Vitals reviewed

## 2020-01-21 ENCOUNTER — TELEPHONE (OUTPATIENT)
Dept: NEUROLOGY | Facility: CLINIC | Age: 30
End: 2020-01-21

## 2020-01-21 DIAGNOSIS — G43.009 MIGRAINE WITHOUT AURA AND WITHOUT STATUS MIGRAINOSUS, NOT INTRACTABLE: ICD-10-CM

## 2020-01-21 RX ORDER — TOPIRAMATE 25 MG/1
TABLET ORAL
Qty: 90 TABLET | Refills: 2 | Status: SHIPPED | OUTPATIENT
Start: 2020-01-21 | End: 2020-02-26

## 2020-01-23 ENCOUNTER — TELEPHONE (OUTPATIENT)
Dept: NEUROLOGY | Facility: CLINIC | Age: 30
End: 2020-01-23

## 2020-01-23 NOTE — TELEPHONE ENCOUNTER
Mailbox is full and cannot leave messages   Called patient to bring in sooner (tomorrow 1/24/2020) at 3:15 in [facility] with [facility]

## 2020-02-13 ENCOUNTER — TELEPHONE (OUTPATIENT)
Dept: NEUROLOGY | Facility: CLINIC | Age: 30
End: 2020-02-13

## 2020-02-13 NOTE — TELEPHONE ENCOUNTER
Contacted patient to offer earlier opening with [address] at [facility] on 2/14/2020 3:15PM - left # to callback to move March appt up

## 2020-02-26 DIAGNOSIS — G43.009 MIGRAINE WITHOUT AURA AND WITHOUT STATUS MIGRAINOSUS, NOT INTRACTABLE: ICD-10-CM

## 2020-02-26 RX ORDER — TOPIRAMATE 25 MG/1
TABLET ORAL
Qty: 120 TABLET | Refills: 5 | Status: SHIPPED | OUTPATIENT
Start: 2020-02-26 | End: 2020-04-09

## 2020-02-26 NOTE — TELEPHONE ENCOUNTER
Called patient to offer sooner appointment with Falguni Green today 2/26 in [facility] Due to work, patient decline  Requested a future appt for 3:45 if possible  Made patient aware that I would update for appt preferences on wait list and call when one becomes available  ALSO: patient states she has not been able to take her topiramate as Dr Pearlie Peabody instructed her to do (2 tabs in the morning, 2 tabs at night)  Per patient when the refill was placed on 1/21/20 the refill instruction did not reflect these changes  The pharmacist advised that patient to take the medication as instructed and to call our office, however patient was not given the proper quantity and "kept forgetting to call" to make us aware  New refill request placed for topiramate

## 2020-03-26 ENCOUNTER — PATIENT MESSAGE (OUTPATIENT)
Dept: NEUROLOGY | Facility: CLINIC | Age: 30
End: 2020-03-26

## 2020-03-26 ENCOUNTER — TELEMEDICINE (OUTPATIENT)
Dept: NEUROLOGY | Facility: CLINIC | Age: 30
End: 2020-03-26
Payer: COMMERCIAL

## 2020-03-26 DIAGNOSIS — G43.009 MIGRAINE WITHOUT AURA AND WITHOUT STATUS MIGRAINOSUS, NOT INTRACTABLE: Primary | ICD-10-CM

## 2020-03-26 DIAGNOSIS — G44.321 INTRACTABLE CHRONIC POST-TRAUMATIC HEADACHE: ICD-10-CM

## 2020-03-26 DIAGNOSIS — R51.9 INTRACTABLE HEADACHE, UNSPECIFIED CHRONICITY PATTERN, UNSPECIFIED HEADACHE TYPE: ICD-10-CM

## 2020-03-26 PROCEDURE — 99214 OFFICE O/P EST MOD 30 MIN: CPT | Performed by: PHYSICIAN ASSISTANT

## 2020-03-26 RX ORDER — GABAPENTIN 100 MG/1
CAPSULE ORAL
Qty: 90 CAPSULE | Refills: 2 | Status: SHIPPED | OUTPATIENT
Start: 2020-03-26 | End: 2020-08-07

## 2020-04-06 PROBLEM — G44.321 INTRACTABLE CHRONIC POST-TRAUMATIC HEADACHE: Status: ACTIVE | Noted: 2020-04-06

## 2020-04-09 ENCOUNTER — TELEPHONE (OUTPATIENT)
Dept: FAMILY MEDICINE CLINIC | Facility: CLINIC | Age: 30
End: 2020-04-09

## 2020-04-09 ENCOUNTER — TELEMEDICINE (OUTPATIENT)
Dept: FAMILY MEDICINE CLINIC | Facility: CLINIC | Age: 30
End: 2020-04-09
Payer: COMMERCIAL

## 2020-04-09 VITALS — HEIGHT: 67 IN | BODY MASS INDEX: 23.09 KG/M2 | TEMPERATURE: 98.4 F

## 2020-04-09 DIAGNOSIS — G47.19 EXCESSIVE DAYTIME SLEEPINESS: Primary | ICD-10-CM

## 2020-04-09 DIAGNOSIS — G43.009 MIGRAINE WITHOUT AURA AND WITHOUT STATUS MIGRAINOSUS, NOT INTRACTABLE: ICD-10-CM

## 2020-04-09 DIAGNOSIS — R41.840 POOR CONCENTRATION: ICD-10-CM

## 2020-04-09 DIAGNOSIS — F32.A ANXIETY AND DEPRESSION: ICD-10-CM

## 2020-04-09 DIAGNOSIS — F41.9 ANXIETY AND DEPRESSION: ICD-10-CM

## 2020-04-09 DIAGNOSIS — G44.321 INTRACTABLE CHRONIC POST-TRAUMATIC HEADACHE: ICD-10-CM

## 2020-04-09 PROCEDURE — 99214 OFFICE O/P EST MOD 30 MIN: CPT | Performed by: PHYSICIAN ASSISTANT

## 2020-04-14 ENCOUNTER — TRANSCRIBE ORDERS (OUTPATIENT)
Dept: SLEEP CENTER | Facility: CLINIC | Age: 30
End: 2020-04-14

## 2020-04-19 ENCOUNTER — HOSPITAL ENCOUNTER (OUTPATIENT)
Dept: SLEEP CENTER | Facility: CLINIC | Age: 30
Discharge: HOME | End: 2020-04-19
Payer: COMMERCIAL

## 2020-04-19 DIAGNOSIS — G47.19 EXCESSIVE DAYTIME SLEEPINESS: ICD-10-CM

## 2020-04-19 PROCEDURE — 95810 POLYSOM 6/> YRS 4/> PARAM: CPT | Performed by: INTERNAL MEDICINE

## 2020-04-19 PROCEDURE — 95810 POLYSOM 6/> YRS 4/> PARAM: CPT

## 2020-04-23 ENCOUNTER — TELEPHONE (OUTPATIENT)
Dept: SLEEP CENTER | Facility: CLINIC | Age: 30
End: 2020-04-23

## 2020-04-24 DIAGNOSIS — G47.33 OSA (OBSTRUCTIVE SLEEP APNEA): Primary | ICD-10-CM

## 2020-04-30 ENCOUNTER — TELEPHONE (OUTPATIENT)
Dept: SLEEP CENTER | Facility: CLINIC | Age: 30
End: 2020-04-30

## 2020-05-01 ENCOUNTER — TELEMEDICINE (OUTPATIENT)
Dept: SLEEP CENTER | Facility: CLINIC | Age: 30
End: 2020-05-01
Payer: COMMERCIAL

## 2020-05-01 VITALS — HEIGHT: 67 IN | WEIGHT: 140 LBS | BODY MASS INDEX: 21.97 KG/M2

## 2020-05-01 DIAGNOSIS — G47.33 OSA (OBSTRUCTIVE SLEEP APNEA): Primary | ICD-10-CM

## 2020-05-01 DIAGNOSIS — G47.01 INSOMNIA DUE TO MEDICAL CONDITION: ICD-10-CM

## 2020-05-01 PROCEDURE — 99215 OFFICE O/P EST HI 40 MIN: CPT | Performed by: NURSE PRACTITIONER

## 2020-05-04 ENCOUNTER — TELEPHONE (OUTPATIENT)
Dept: SLEEP CENTER | Facility: CLINIC | Age: 30
End: 2020-05-04

## 2020-05-11 ENCOUNTER — TELEPHONE (OUTPATIENT)
Dept: NEUROLOGY | Facility: CLINIC | Age: 30
End: 2020-05-11

## 2020-05-12 ENCOUNTER — TELEMEDICINE (OUTPATIENT)
Dept: NEUROLOGY | Facility: CLINIC | Age: 30
End: 2020-05-12
Payer: COMMERCIAL

## 2020-05-12 DIAGNOSIS — F41.9 ANXIETY AND DEPRESSION: ICD-10-CM

## 2020-05-12 DIAGNOSIS — G44.321 INTRACTABLE CHRONIC POST-TRAUMATIC HEADACHE: ICD-10-CM

## 2020-05-12 DIAGNOSIS — F32.A ANXIETY AND DEPRESSION: ICD-10-CM

## 2020-05-12 DIAGNOSIS — G43.009 MIGRAINE WITHOUT AURA AND WITHOUT STATUS MIGRAINOSUS, NOT INTRACTABLE: Primary | ICD-10-CM

## 2020-05-12 DIAGNOSIS — G47.33 OSA (OBSTRUCTIVE SLEEP APNEA): ICD-10-CM

## 2020-05-12 PROCEDURE — 99214 OFFICE O/P EST MOD 30 MIN: CPT | Performed by: PHYSICIAN ASSISTANT

## 2020-05-13 ENCOUNTER — TELEPHONE (OUTPATIENT)
Dept: NEUROLOGY | Facility: CLINIC | Age: 30
End: 2020-05-13

## 2020-05-19 ENCOUNTER — TELEPHONE (OUTPATIENT)
Dept: NEUROLOGY | Facility: CLINIC | Age: 30
End: 2020-05-19

## 2020-05-19 DIAGNOSIS — G44.321 INTRACTABLE CHRONIC POST-TRAUMATIC HEADACHE: Primary | ICD-10-CM

## 2020-05-19 RX ORDER — KETOROLAC TROMETHAMINE 10 MG/1
10 TABLET, FILM COATED ORAL EVERY 6 HOURS PRN
Qty: 10 TABLET | Refills: 0 | Status: SHIPPED | OUTPATIENT
Start: 2020-05-19 | End: 2020-08-13 | Stop reason: SDUPTHER

## 2020-07-17 ENCOUNTER — TELEMEDICINE (OUTPATIENT)
Dept: FAMILY MEDICINE CLINIC | Facility: CLINIC | Age: 30
End: 2020-07-17
Payer: COMMERCIAL

## 2020-07-17 VITALS — BODY MASS INDEX: 22.7 KG/M2 | WEIGHT: 144.6 LBS | HEIGHT: 67 IN | TEMPERATURE: 98.3 F

## 2020-07-17 DIAGNOSIS — J01.10 ACUTE NON-RECURRENT FRONTAL SINUSITIS: Primary | ICD-10-CM

## 2020-07-17 DIAGNOSIS — F41.9 ANXIETY AND DEPRESSION: ICD-10-CM

## 2020-07-17 DIAGNOSIS — F32.A ANXIETY AND DEPRESSION: ICD-10-CM

## 2020-07-17 PROCEDURE — 1036F TOBACCO NON-USER: CPT | Performed by: PHYSICIAN ASSISTANT

## 2020-07-17 PROCEDURE — 99214 OFFICE O/P EST MOD 30 MIN: CPT | Performed by: PHYSICIAN ASSISTANT

## 2020-07-17 PROCEDURE — 3008F BODY MASS INDEX DOCD: CPT | Performed by: PHYSICIAN ASSISTANT

## 2020-07-17 RX ORDER — AMOXICILLIN 500 MG/1
500 CAPSULE ORAL EVERY 8 HOURS SCHEDULED
Qty: 30 CAPSULE | Refills: 0 | Status: SHIPPED | OUTPATIENT
Start: 2020-07-17 | End: 2020-07-27

## 2020-07-17 RX ORDER — CITALOPRAM 20 MG/1
20 TABLET ORAL DAILY
Qty: 30 TABLET | Refills: 2 | Status: SHIPPED | OUTPATIENT
Start: 2020-07-17 | End: 2021-05-13

## 2020-07-17 NOTE — PROGRESS NOTES
Virtual Regular Visit      Assessment/Plan:    Problem List Items Addressed This Visit        Other    Anxiety and depression    Relevant Medications    citalopram (CeleXA) 20 mg tablet      Other Visit Diagnoses     Acute non-recurrent frontal sinusitis    -  Primary    Relevant Medications    amoxicillin (AMOXIL) 500 mg capsule        Marbella would like to try decreasing citalopram from 30 mg to 20 mg daily  Will call in the Rx reflecting the same  Also, if her sinus symptoms are not improving by Monday, she will call the office and let me know  If anxiety/depression worsens at 20 mg of citalopram, she will also call and let me know that as well  Reason for visit is   Chief Complaint   Patient presents with    Cold Like Symptoms     Cough, stuffy nose, congestion, sore throat and left ear pain x 2 days   Virtual Regular Visit        Encounter provider Teresa Neville PA-C    Provider located at [address] [address]      Recent Visits  No visits were found meeting these conditions  Showing recent visits within past 7 days and meeting all other requirements     Today's Visits  Date Type Provider Dept   07/17/20 Telemedicine Teresa Neville PA-C [facility]   Showing today's visits and meeting all other requirements     Future Appointments  No visits were found meeting these conditions  Showing future appointments within next 150 days and meeting all other requirements        The patient was identified by name and date of birth  Horacio Tompkins was informed that this is a telemedicine visit and that the visit is being conducted through [employer] and patient was informed that this is not a secure, HIPAA-complaint platform  She agrees to proceed     My office door was closed  No one else was in the room  She acknowledged consent and understanding of privacy and security of the video platform   The patient has agreed to participate and understands they can discontinue the visit at any time  Patient is aware this is a billable service  Subjective  Kimberly Loya is a 34 y o  female    Marbella's visit today is due to acute sinus symptoms  Started 2 days ago with frontal sinus pressure, sore throat, and L ear pain  She has a mild cough  Denies fevers/chills, SOB, GI symptoms, denies changes in taste/smell  Denies sick contacts  Past Medical History:   Diagnosis Date    Acute appendicitis with localized peritonitis     Acute otitis media, right 3/23/2019    Anxiety     Anxiety and depression     Last Assessed: 4/17/2017    Depression     No meds currently  Last Assessed: 9/24/2015    Dysuria     Last Assessed: 8/1/2016    Endometriosis     Last Assessed: 1/23/2015    Fatigue     Last Assessed: 9/24/2015    Granulation tissue at vaginal vault     Last Assessed: 2/6/2017    Irregular menses     Migraines     Pelvic pain in female     Left pelvic area      Trichomoniasis     Resolved: 5/18/2017     Vaginal spotting     Last Assessed: 1/30/2017       Past Surgical History:   Procedure Laterality Date    APPENDECTOMY  10/06/2016    HYSTERECTOMY      LAPAROSCOPIC ENDOMETRIOSIS FULGURATION  2012    PELVIC LAPAROSCOPY      [place] LAP,APPENDECTOMY N/A 10/6/2016    Procedure: APPENDECTOMY LAPAROSCOPIC;  Surgeon: Nathalie Natarajan MD;  [facility];  Service: General    [place] LAPAROSCOPY W TOT HYSTERECTUTERUS <=250 Josef Gather TUBE/OVARY N/A 7/25/2016    Procedure: HYSTERECTOMY LAPAROSCOPIC TOTAL  LSO  right salpingectomy  diagnostic cystoscopy;  Surgeon: Ti Woodson MD;  [facility];  Service: Gynecology    TUBAL LIGATION  03/2015    VAGINAL HYSTERECTOMY      Last Assessed: 9/16/2016       Current Outpatient Medications   Medication Sig Dispense Refill    ALPRAZolam (XANAX) 0 25 mg tablet Take 1 tablet (0 25 mg total) by mouth daily at bedtime as needed for anxiety 20 tablet 0    citalopram (CeleXA) 20 mg tablet Take 1 tablet (20 mg total) by mouth daily 30 tablet 2    gabapentin (NEURONTIN) 100 mg capsule One tab at bedtime x5 days, then 2 tabs at bedtime x5 days, then 3 tabs at bedtime 90 capsule 2    ketorolac (TORADOL) 10 mg tablet Take 1 tablet (10 mg total) by mouth every 6 (six) hours as needed (migraine) Max 2-3 per week  Take with food/ antacid  10 tablet 0    Multiple Vitamins-Minerals (MULTIVITAL) tablet Take 1 tablet by mouth daily      amoxicillin (AMOXIL) 500 mg capsule Take 1 capsule (500 mg total) by mouth every 8 (eight) hours for 10 days 30 capsule 0     No current facility-administered medications for this visit  No Known Allergies    Review of Systems   Constitutional: Negative for activity change, appetite change, chills, diaphoresis, fatigue, fever and unexpected weight change  HENT: Positive for congestion, ear pain, postnasal drip, rhinorrhea, sinus pressure, sinus pain and sore throat  Negative for sneezing, tinnitus and voice change  Eyes: Negative for pain, redness and visual disturbance  Respiratory: Positive for cough (mild)  Negative for chest tightness, shortness of breath and wheezing  Cardiovascular: Negative for chest pain, palpitations and leg swelling  Gastrointestinal: Negative for abdominal pain, blood in stool, constipation, diarrhea, nausea and vomiting  Genitourinary: Negative for difficulty urinating, dysuria, frequency, hematuria and urgency  Musculoskeletal: Negative for arthralgias, back pain, gait problem, joint swelling, myalgias, neck pain and neck stiffness  Skin: Negative for color change, pallor, rash and wound  Neurological: Negative for dizziness, tremors, weakness, light-headedness and headaches  Psychiatric/Behavioral: Negative for dysphoric mood, self-injury, sleep disturbance and suicidal ideas  The patient is not nervous/anxious          Video Exam    Vitals:    07/17/20 0931   Temp: 98 3 °F (36 8 °C)   Weight: 65 6 kg (144 lb 9 6 oz)   Height: 5' 7" (1 702 m)       Physical Exam   Constitutional: She is oriented to person, place, and time  She appears well-nourished  No distress  HENT:   Head: Normocephalic and atraumatic  Eyes: Conjunctivae are normal    Pulmonary/Chest: Effort normal  No respiratory distress  Pt speaking in clear, fluent sentences without cough or apparent SOB  Neurological: She is alert and oriented to person, place, and time  Skin: She is not diaphoretic  Psychiatric: She has a normal mood and affect  Her behavior is normal  Judgment and thought content normal    Vitals reviewed  VIRTUAL VISIT DISCLAIMER    Collins Ignacio acknowledges that she has consented to an online visit or consultation  She understands that the online visit is based solely on information provided by her, and that, in the absence of a face-to-face physical evaluation by the physician, the diagnosis she receives is both limited and provisional in terms of accuracy and completeness  This is not intended to replace a full medical face-to-face evaluation by the physician  Collins Ignacio understands and accepts these terms

## 2020-08-06 DIAGNOSIS — R51.9 INTRACTABLE HEADACHE, UNSPECIFIED CHRONICITY PATTERN, UNSPECIFIED HEADACHE TYPE: ICD-10-CM

## 2020-08-09 RX ORDER — GABAPENTIN 100 MG/1
CAPSULE ORAL
Qty: 90 CAPSULE | Refills: 3 | Status: SHIPPED | OUTPATIENT
Start: 2020-08-09 | End: 2020-11-24 | Stop reason: SDUPTHER

## 2020-08-12 ENCOUNTER — TELEPHONE (OUTPATIENT)
Dept: NEUROLOGY | Facility: CLINIC | Age: 30
End: 2020-08-12

## 2020-08-12 NOTE — TELEPHONE ENCOUNTER
LMOM to confirm 8/13/2020 3:45PM [address] at [facility] and review covid screening questions and to complete registration, please transfer to [address]  ADVISED OF NO SHOW FEE  Please make aware of mask, visitor and temp policy

## 2020-08-13 ENCOUNTER — TELEMEDICINE (OUTPATIENT)
Dept: NEUROLOGY | Facility: CLINIC | Age: 30
End: 2020-08-13
Payer: COMMERCIAL

## 2020-08-13 VITALS — HEIGHT: 67 IN | WEIGHT: 140 LBS | BODY MASS INDEX: 21.97 KG/M2

## 2020-08-13 DIAGNOSIS — G47.33 OSA (OBSTRUCTIVE SLEEP APNEA): ICD-10-CM

## 2020-08-13 DIAGNOSIS — G43.009 MIGRAINE WITHOUT AURA AND WITHOUT STATUS MIGRAINOSUS, NOT INTRACTABLE: ICD-10-CM

## 2020-08-13 DIAGNOSIS — G44.321 INTRACTABLE CHRONIC POST-TRAUMATIC HEADACHE: Primary | ICD-10-CM

## 2020-08-13 PROCEDURE — G2012 BRIEF CHECK IN BY MD/QHP: HCPCS | Performed by: PHYSICIAN ASSISTANT

## 2020-08-13 PROCEDURE — 3008F BODY MASS INDEX DOCD: CPT | Performed by: PHYSICIAN ASSISTANT

## 2020-08-13 RX ORDER — KETOROLAC TROMETHAMINE 10 MG/1
10 TABLET, FILM COATED ORAL EVERY 6 HOURS PRN
Qty: 10 TABLET | Refills: 2 | Status: SHIPPED | OUTPATIENT
Start: 2020-08-13 | End: 2021-03-08 | Stop reason: SDUPTHER

## 2020-08-13 NOTE — PROGRESS NOTES
Virtual Brief Visit    Assessment/Plan:    Problem List Items Addressed This Visit        Respiratory    KAI (obstructive sleep apnea)       Cardiovascular and Mediastinum    Migraine without aura and without status migrainosus, not intractable    Relevant Medications    ketorolac (TORADOL) 10 mg tablet       Other    Intractable chronic post-traumatic headache - Primary    Relevant Medications    ketorolac (TORADOL) 10 mg tablet        Migraine headaches are improved on gabapentin  She currently takes 300 mg q h s , and will optimize the medication to 400 mg q h s  For further control  Side effects reviewed including dizziness, sedation and swelling  She will message me or call for a refill of the 400 mg after about a week of trialing it  Continue ibuprofen p r n  Low-grade headache, Toradol for a more severe headache  She understands not to take these two within 24 hours of each other  No more than 3 abortive medications per week  Continue PAP re: KAI  She was urged to follow with sleep medicine  The patient should not hesitate to call me prior to her follow up with any questions or concerns  The patient was instructed to urgently call 911 or present to the nearest emergency room with any new or worsening neurological deficits  Reason for visit is   Chief Complaint   Patient presents with    Migraine    Virtual Brief Visit        Encounter provider Joseph Crouch PA-C    Provider located at [address] [address]    Recent Visits  Date Type Provider Dept   08/13/20 106 Michelle Powell PA-C [facility] [place]   08/12/20 Telephone [facility] 98  recent visits within past 7 days and meeting all other requirements     Future Appointments  No visits were found meeting these conditions     Showing future appointments within next 150 days and meeting all other requirements After connecting through telephone, the patient was identified by name and date of birth  Oksana Campo was informed that this is a telemedicine visit and that the visit is being conducted through telephone  My office door was closed  No one else was in the room  She acknowledged consent and understanding of privacy and security of the platform  The patient has agreed to participate and understands she can discontinue the visit at any time  Patient is aware this is a billable service  It was my intent to perform this visit via video technology but the patient was not able to do a video connection so the visit was completed via audio telephone only  Subjective    Oksana Campo is a 34 y o  female who is contacted via telemedicine for neurological follow-up  HPI     The patient was recently in [facility] and could not come into the office, due to the rules of needing to quarantine for 1-2 weeks after travel  Unfortunately gabapentin did not get refilled in time prior to her leaving to [place], and she did not have the medication for almost an entire week  She states her migraine headaches are worse without having the medication, therefore she knows it is working      She continues to have particular triggers such as hot weather, bright lights especially the sun, being outside in the sun  At the onset of a headache she takes ibuprofen which works fairly well  If it is more severe headache she uses Toradol  She will use Toradol if she wakes up with headache because she knows it is going to be a severe 1 in that case, this is usually a very stinging type of pain  Toradol helps this case  If headaches come on gradually during the day she knows it is going to be low-grade so she only takes ibuprofen  She gets about 2-3 low-grade headaches per week on gabapentin but they are more manageable  She has to take Toradol about once per week for a more severe migraine      She continues a positive airway pressure which has significantly improved sleep function and headaches as well  She has no new concerns at this time  She is very happy with the gabapentin overall  States she was even able to wean down on the Celexa from 30-20  Past Medical History:   Diagnosis Date    Acute appendicitis with localized peritonitis     Acute otitis media, right 3/23/2019    Anxiety     Anxiety and depression     Last Assessed: 4/17/2017    Depression     No meds currently  Last Assessed: 9/24/2015    Dysuria     Last Assessed: 8/1/2016    Endometriosis     Last Assessed: 1/23/2015    Fatigue     Last Assessed: 9/24/2015    Granulation tissue at vaginal vault     Last Assessed: 2/6/2017    Irregular menses     Migraines     Pelvic pain in female     Left pelvic area   Trichomoniasis     Resolved: 5/18/2017     Vaginal spotting     Last Assessed: 1/30/2017       Past Surgical History:   Procedure Laterality Date    APPENDECTOMY  10/06/2016    HYSTERECTOMY      LAPAROSCOPIC ENDOMETRIOSIS FULGURATION  2012    PELVIC LAPAROSCOPY      [place] LAP,APPENDECTOMY N/A 10/6/2016    Procedure: APPENDECTOMY LAPAROSCOPIC;  Surgeon: Aysha Odell MD;  [facility];  Service: General    [place] LAPAROSCOPY W TOT HYSTERECTUTERUS <=250 Wynema Fellers TUBE/OVARY N/A 7/25/2016    Procedure: HYSTERECTOMY LAPAROSCOPIC TOTAL  LSO  right salpingectomy  diagnostic cystoscopy;  Surgeon: Jamaal Segovia MD;  [facility];  Service: Gynecology    TUBAL LIGATION  03/2015    VAGINAL HYSTERECTOMY      Last Assessed: 9/16/2016       Current Outpatient Medications   Medication Sig Dispense Refill    ALPRAZolam (XANAX) 0 25 mg tablet Take 1 tablet (0 25 mg total) by mouth daily at bedtime as needed for anxiety 20 tablet 0    citalopram (CeleXA) 20 mg tablet Take 1 tablet (20 mg total) by mouth daily 30 tablet 2    gabapentin (NEURONTIN) 100 mg capsule TAKE 3 CASPSULES BY MOUTH AT BEDTIME   90 capsule 3    ketorolac (TORADOL) 10 mg tablet Take 1 tablet (10 mg total) by mouth every 6 (six) hours as needed (migraine) Max 2-3 per week  Take with food/ antacid  10 tablet 2    Multiple Vitamins-Minerals (MULTIVITAL) tablet Take 1 tablet by mouth daily       No current facility-administered medications for this visit  No Known Allergies    Review of Systems   Constitutional: Negative  Negative for appetite change and fever  HENT: Negative  Negative for hearing loss, tinnitus, trouble swallowing and voice change  Eyes: Negative  Negative for photophobia and pain  Respiratory: Negative  Negative for shortness of breath  Cardiovascular: Negative  Negative for palpitations  Gastrointestinal: Negative  Negative for nausea and vomiting  Endocrine: Negative  Negative for cold intolerance  Genitourinary: Negative  Negative for dysuria, frequency and urgency  Musculoskeletal: Negative  Negative for myalgias and neck pain  Skin: Negative  Negative for rash  Neurological: Positive for tremors (hands ) and headaches (migraines have been doing a little better, they come and go , controlled with medication )  Negative for dizziness, seizures, syncope, facial asymmetry, speech difficulty, weakness, light-headedness and numbness  Hematological: Bruises/bleeds easily  Psychiatric/Behavioral: Negative  Negative for confusion, hallucinations and sleep disturbance  The following portions of the patient's history were reviewed and updated as appropriate: allergies, current medications/ medication history, past family history, past medical history, past social history, past surgical history and problem list     Review of systems was reviewed and otherwise unremarkable from a neurological perspective  Vitals:    08/13/20 1539   Weight: 63 5 kg (140 lb)   Height: 5' 7" (1 702 m)   Body mass index is 21 93 kg/m²        I spent 22 minutes directly with the patient during this visit    VIRTUAL VISIT DISCLAIMER    Dagoberto Carcamo acknowledges that she has consented to an online visit or consultation  She understands that the online visit is based solely on information provided by her, and that, in the absence of a face-to-face physical evaluation by the physician, the diagnosis she receives is both limited and provisional in terms of accuracy and completeness  This is not intended to replace a full medical face-to-face evaluation by the physician  Dagoberto Carcamo understands and accepts these terms

## 2020-08-17 ENCOUNTER — TELEPHONE (OUTPATIENT)
Dept: NEUROLOGY | Facility: CLINIC | Age: 30
End: 2020-08-17

## 2020-08-17 NOTE — TELEPHONE ENCOUNTER
Scheduled 3m f/u on Tuesday, 11/24/2020 1:30PM at [facility]  Declined appt card or AVS        ----- Message from Davon See PA-C sent at 8/14/2020  4:32 PM EDT -----  Can you please schedule pt for 3 months? Thanks  [address], patient wanted to relay message to you that "the 400 is working amazing  Thank you so much"

## 2020-09-23 ENCOUNTER — OFFICE VISIT (OUTPATIENT)
Dept: OBGYN CLINIC | Facility: CLINIC | Age: 30
End: 2020-09-23
Payer: COMMERCIAL

## 2020-09-23 VITALS — SYSTOLIC BLOOD PRESSURE: 120 MMHG | DIASTOLIC BLOOD PRESSURE: 60 MMHG | BODY MASS INDEX: 23.65 KG/M2 | WEIGHT: 151 LBS

## 2020-09-23 DIAGNOSIS — N80.9 ENDOMETRIOSIS: ICD-10-CM

## 2020-09-23 DIAGNOSIS — R10.2 PELVIC PAIN: Primary | ICD-10-CM

## 2020-09-23 PROCEDURE — 99214 OFFICE O/P EST MOD 30 MIN: CPT | Performed by: OBSTETRICS & GYNECOLOGY

## 2020-09-23 PROCEDURE — 1036F TOBACCO NON-USER: CPT | Performed by: OBSTETRICS & GYNECOLOGY

## 2020-09-23 NOTE — PROGRESS NOTES
[facility] [address], [address], [place], [place]    Assessment/Plan:  No problem-specific Assessment & Plan notes found for this encounter  Diagnoses and all orders for this visit:    Pelvic pain  -     norelgestromin-ethinyl estradiol (ORTHO EVRA) 150-35 MCG/24HR; Place one patch weekly  Use continuously    Endometriosis  -     norelgestromin-ethinyl estradiol (ORTHO EVRA) 150-35 MCG/24HR; Place one patch weekly  Use continuously    will do continuous contraception to suppress ovaries  Subjective:   Oksana Campo is a 34 y o   female  CC: pain in my right side    HPI: Patient is a 34 y o   female presents with complaints of RLQ/pelvic pain  Onset of symptoms was gradual starting 1 year ago with gradually worsening course since that time  The pain occurs with intercourse and uncertain relation to menses as patient had prior hysterectomy  It is located in the RLQ and lasts hours  She describes the pain as sharp and stabbing  Symptoms improve with none  Patient states she has tried motrin, tylenol, heat, ice, and rest without relief  In the past, she has undergone medical management, including hysterectomy with LSO  She has history of trichamonas  She does not desire further childbearing  ROS: Review of Systems   Constitutional: Negative  HENT: Negative  Respiratory: Negative  Cardiovascular: Negative  Gastrointestinal: Negative  Negative for abdominal distention, anal bleeding, constipation, diarrhea and nausea  Genitourinary: Positive for pelvic pain  Negative for decreased urine volume, difficulty urinating, dysuria, frequency, genital sores, hematuria, urgency, vaginal bleeding, vaginal discharge and vaginal pain  Musculoskeletal: Negative  Psychiatric/Behavioral: Negative          PFSH: The following portions of the patient's history were reviewed and updated as appropriate: allergies, current medications, past family history, past medical history, obstetric history, gynecologic history, past social history, past surgical history and problem list        Objective:  /60   Wt 68 5 kg (151 lb)   LMP  (LMP Unknown) Comment: uhcg negative  BMI 23 65 kg/m²    Physical Exam  Vitals signs reviewed  HENT:      Head: Normocephalic and atraumatic  Cardiovascular:      Rate and Rhythm: Normal rate  Pulmonary:      Effort: Pulmonary effort is normal  No respiratory distress  Abdominal:      General: Abdomen is flat  Bowel sounds are normal  There is no distension  Palpations: Abdomen is soft  There is no mass  Tenderness: There is no abdominal tenderness  There is no right CVA tenderness, left CVA tenderness, guarding or rebound  Hernia: No hernia is present  Genitourinary:     Exam position: Lithotomy position  Pubic Area: No rash  Labia:         Right: No rash  Left: No rash  Urethra: No prolapse (urethral meatus normal)  Vagina: Normal       Uterus: Absent  Adnexa:         Right: Tenderness present  No mass or fullness  Comments: Cervix absent  Left ovary absent    Neurological:      Mental Status: She is alert

## 2020-11-05 ENCOUNTER — OFFICE VISIT (OUTPATIENT)
Dept: FAMILY MEDICINE CLINIC | Facility: CLINIC | Age: 30
End: 2020-11-05
Payer: COMMERCIAL

## 2020-11-05 VITALS
TEMPERATURE: 96.2 F | DIASTOLIC BLOOD PRESSURE: 66 MMHG | OXYGEN SATURATION: 98 % | BODY MASS INDEX: 23.61 KG/M2 | SYSTOLIC BLOOD PRESSURE: 112 MMHG | WEIGHT: 150.4 LBS | HEIGHT: 67 IN | HEART RATE: 71 BPM

## 2020-11-05 DIAGNOSIS — H10.9 BACTERIAL CONJUNCTIVITIS OF LEFT EYE: Primary | ICD-10-CM

## 2020-11-05 DIAGNOSIS — Z11.59 ENCOUNTER FOR SCREENING FOR OTHER VIRAL DISEASES: ICD-10-CM

## 2020-11-05 DIAGNOSIS — Z23 NEED FOR INFLUENZA VACCINATION: ICD-10-CM

## 2020-11-05 PROCEDURE — 90686 IIV4 VACC NO PRSV 0.5 ML IM: CPT | Performed by: PHYSICIAN ASSISTANT

## 2020-11-05 PROCEDURE — 90471 IMMUNIZATION ADMIN: CPT | Performed by: PHYSICIAN ASSISTANT

## 2020-11-05 PROCEDURE — 3008F BODY MASS INDEX DOCD: CPT | Performed by: PHYSICIAN ASSISTANT

## 2020-11-05 PROCEDURE — 99214 OFFICE O/P EST MOD 30 MIN: CPT | Performed by: PHYSICIAN ASSISTANT

## 2020-11-05 RX ORDER — TOBRAMYCIN 3 MG/ML
2 SOLUTION/ DROPS OPHTHALMIC
Qty: 5 ML | Refills: 0 | Status: SHIPPED | OUTPATIENT
Start: 2020-11-05 | End: 2020-11-12

## 2020-11-09 ENCOUNTER — TELEPHONE (OUTPATIENT)
Dept: OBGYN CLINIC | Facility: MEDICAL CENTER | Age: 30
End: 2020-11-09

## 2020-11-09 DIAGNOSIS — Z11.59 ENCOUNTER FOR SCREENING FOR OTHER VIRAL DISEASES: ICD-10-CM

## 2020-11-09 PROCEDURE — U0003 INFECTIOUS AGENT DETECTION BY NUCLEIC ACID (DNA OR RNA); SEVERE ACUTE RESPIRATORY SYNDROME CORONAVIRUS 2 (SARS-COV-2) (CORONAVIRUS DISEASE [COVID-19]), AMPLIFIED PROBE TECHNIQUE, MAKING USE OF HIGH THROUGHPUT TECHNOLOGIES AS DESCRIBED BY CMS-2020-01-R: HCPCS | Performed by: PHYSICIAN ASSISTANT

## 2020-11-10 LAB — SARS-COV-2 RNA SPEC QL NAA+PROBE: NOT DETECTED

## 2020-11-11 ENCOUNTER — TELEPHONE (OUTPATIENT)
Dept: OBGYN CLINIC | Facility: MEDICAL CENTER | Age: 30
End: 2020-11-11

## 2020-11-11 DIAGNOSIS — N80.9 ENDOMETRIOSIS: ICD-10-CM

## 2020-11-11 DIAGNOSIS — R10.2 PELVIC PAIN: ICD-10-CM

## 2020-11-18 ENCOUNTER — TELEPHONE (OUTPATIENT)
Dept: FAMILY MEDICINE CLINIC | Facility: CLINIC | Age: 30
End: 2020-11-18

## 2020-11-19 ENCOUNTER — TELEPHONE (OUTPATIENT)
Dept: NEUROLOGY | Facility: CLINIC | Age: 30
End: 2020-11-19

## 2020-11-19 DIAGNOSIS — G44.321 CHRONIC POST-TRAUMATIC HEADACHE, INTRACTABLE: Primary | ICD-10-CM

## 2020-11-23 ENCOUNTER — TELEPHONE (OUTPATIENT)
Dept: NEUROLOGY | Facility: CLINIC | Age: 30
End: 2020-11-23

## 2020-11-24 ENCOUNTER — TELEPHONE (OUTPATIENT)
Dept: NEUROLOGY | Facility: CLINIC | Age: 30
End: 2020-11-24

## 2020-11-24 ENCOUNTER — TELEMEDICINE (OUTPATIENT)
Dept: NEUROLOGY | Facility: CLINIC | Age: 30
End: 2020-11-24
Payer: COMMERCIAL

## 2020-11-24 DIAGNOSIS — G47.33 OSA (OBSTRUCTIVE SLEEP APNEA): ICD-10-CM

## 2020-11-24 DIAGNOSIS — G44.321 CHRONIC POST-TRAUMATIC HEADACHE, INTRACTABLE: ICD-10-CM

## 2020-11-24 DIAGNOSIS — G43.009 MIGRAINE WITHOUT AURA AND WITHOUT STATUS MIGRAINOSUS, NOT INTRACTABLE: Primary | ICD-10-CM

## 2020-11-24 PROCEDURE — 1036F TOBACCO NON-USER: CPT | Performed by: PHYSICIAN ASSISTANT

## 2020-11-24 PROCEDURE — 99214 OFFICE O/P EST MOD 30 MIN: CPT | Performed by: PHYSICIAN ASSISTANT

## 2020-11-24 RX ORDER — GABAPENTIN 400 MG/1
CAPSULE ORAL
Qty: 90 CAPSULE | Refills: 3 | Status: SHIPPED | OUTPATIENT
Start: 2020-11-24 | End: 2022-03-16

## 2020-12-02 ENCOUNTER — TELEPHONE (OUTPATIENT)
Dept: NEUROLOGY | Facility: CLINIC | Age: 30
End: 2020-12-02

## 2020-12-03 ENCOUNTER — TELEPHONE (OUTPATIENT)
Dept: NEUROLOGY | Facility: CLINIC | Age: 30
End: 2020-12-03

## 2020-12-03 ENCOUNTER — PATIENT MESSAGE (OUTPATIENT)
Dept: NEUROLOGY | Facility: CLINIC | Age: 30
End: 2020-12-03

## 2020-12-03 DIAGNOSIS — G44.321 CHRONIC POST-TRAUMATIC HEADACHE, INTRACTABLE: Primary | ICD-10-CM

## 2020-12-03 DIAGNOSIS — G43.009 MIGRAINE WITHOUT AURA AND WITHOUT STATUS MIGRAINOSUS, NOT INTRACTABLE: ICD-10-CM

## 2020-12-03 RX ORDER — DEXAMETHASONE 2 MG/1
TABLET ORAL
Qty: 5 TABLET | Refills: 0 | Status: SHIPPED | OUTPATIENT
Start: 2020-12-03 | End: 2021-02-12 | Stop reason: SDUPTHER

## 2020-12-05 DIAGNOSIS — R10.2 PELVIC PAIN: ICD-10-CM

## 2020-12-05 DIAGNOSIS — N80.9 ENDOMETRIOSIS: ICD-10-CM

## 2020-12-07 RX ORDER — NORELGESTROMIN AND ETHINYL ESTRADIOL 150; 35 UG/D; UG/D
PATCH TRANSDERMAL
Qty: 3 PATCH | Refills: 1 | Status: SHIPPED | OUTPATIENT
Start: 2020-12-07 | End: 2021-01-27 | Stop reason: ALTCHOICE

## 2020-12-17 ENCOUNTER — TELEMEDICINE (OUTPATIENT)
Dept: FAMILY MEDICINE CLINIC | Facility: CLINIC | Age: 30
End: 2020-12-17
Payer: COMMERCIAL

## 2020-12-17 VITALS — BODY MASS INDEX: 23.54 KG/M2 | WEIGHT: 150 LBS | HEIGHT: 67 IN

## 2020-12-17 DIAGNOSIS — H10.9 BACTERIAL CONJUNCTIVITIS OF LEFT EYE: Primary | ICD-10-CM

## 2020-12-17 PROCEDURE — 1036F TOBACCO NON-USER: CPT | Performed by: FAMILY MEDICINE

## 2020-12-17 PROCEDURE — 3725F SCREEN DEPRESSION PERFORMED: CPT | Performed by: FAMILY MEDICINE

## 2020-12-17 PROCEDURE — 99213 OFFICE O/P EST LOW 20 MIN: CPT | Performed by: FAMILY MEDICINE

## 2020-12-17 PROCEDURE — 3008F BODY MASS INDEX DOCD: CPT | Performed by: FAMILY MEDICINE

## 2020-12-17 RX ORDER — OFLOXACIN 3 MG/ML
1 SOLUTION/ DROPS OPHTHALMIC 4 TIMES DAILY
Qty: 5 ML | Refills: 0 | Status: SHIPPED | OUTPATIENT
Start: 2020-12-17 | End: 2021-01-27 | Stop reason: ALTCHOICE

## 2020-12-18 ENCOUNTER — TELEPHONE (OUTPATIENT)
Dept: OBGYN CLINIC | Facility: CLINIC | Age: 30
End: 2020-12-18

## 2021-01-27 ENCOUNTER — TELEMEDICINE (OUTPATIENT)
Dept: FAMILY MEDICINE CLINIC | Facility: CLINIC | Age: 31
End: 2021-01-27
Payer: COMMERCIAL

## 2021-01-27 VITALS — TEMPERATURE: 99.6 F | HEIGHT: 67 IN | BODY MASS INDEX: 23.54 KG/M2 | WEIGHT: 150 LBS

## 2021-01-27 DIAGNOSIS — Z20.822 EXPOSURE TO COVID-19 VIRUS: Primary | ICD-10-CM

## 2021-01-27 PROCEDURE — G2012 BRIEF CHECK IN BY MD/QHP: HCPCS | Performed by: PHYSICIAN ASSISTANT

## 2021-01-27 PROCEDURE — 3008F BODY MASS INDEX DOCD: CPT | Performed by: FAMILY MEDICINE

## 2021-01-27 NOTE — PROGRESS NOTES
COVID-19 Virtual Visit     Assessment/Plan:    Problem List Items Addressed This Visit     None      Visit Diagnoses     Exposure to COVID-19 virus    -  Primary    Relevant Orders    Novel Coronavirus (Covid-19),PCR [facility] - Collected at [facility] or [facility]         Disposition:     I recommended self-quarantine for 14 days and to call back for worsening symptoms or development of shortness of breath  I referred patient to one of our centralized sites for a COVID-19 swab  I have spent 10 minutes directly with the patient  Greater than 50% of this time was spent in counseling/coordination of care regarding: risks and benefits of treatment options, instructions for management and patient and family education  Encounter provider Jerrell Easton PA-C    Provider located at [address] [address]    Recent Visits  No visits were found meeting these conditions  Showing recent visits within past 7 days and meeting all other requirements     Today's Visits  Date Type Provider Dept   01/27/21 Telemedicine Jerrell Easton PA-C  [facility]   Showing today's visits and meeting all other requirements     Future Appointments  No visits were found meeting these conditions  Showing future appointments within next 150 days and meeting all other requirements        Patient agrees to participate in a virtual check in via telephone or video visit instead of presenting to the office to address urgent/immediate medical needs  Patient is aware this is a billable service  After connecting through Telephone, the patient was identified by name and date of birth  Rosa M Perdomo was informed that this was a telemedicine visit and that the exam was being conducted confidentially over secure lines  My office door was closed  No one else was in the room  Rosa M Perdomo acknowledged consent and understanding of privacy and security of the telemedicine visit   I informed the patient that I have reviewed her record in Epic and presented the opportunity for her to ask any questions regarding the visit today  The patient agreed to participate  It was my intent to perform this visit via video technology but the patient was not able to do a video connection so the visit was completed via audio telephone only  Subjective:   Katie Jeffries is a 27 y o  female who is concerned about COVID-19  Patient's symptoms include fever (100 3), fatigue, nasal congestion and rhinorrhea  Patient denies chills, sore throat, anosmia, loss of taste, cough, shortness of breath, chest tightness, abdominal pain, nausea, vomiting, diarrhea, myalgias and headaches  Date of symptom onset: 1/26/2021  Date of exposure: 1/22/2021    Exposure:   Contact with a person who is under investigation (PUI) for or who is positive for COVID-19 within the last 14 days?: Yes    Hospitalized recently for fever and/or lower respiratory symptoms?: No      Currently a healthcare worker that is involved in direct patient care?: No      Works in a special setting where the risk of COVID-19 transmission may be high? (this may include long-term care, correctional and retirement facilities; homeless shelters; assisted-living facilities and group homes ): No      Resident in a special setting where the risk of COVID-19 transmission may be high? (this may include long-term care, correctional and retirement facilities; homeless shelters; assisted-living facilities and group homes ): No      Lab Results   Component Value Date    Dorcas Kelly Not Detected 11/09/2020     Past Medical History:   Diagnosis Date    Acute appendicitis with localized peritonitis     Acute otitis media, right 3/23/2019    Anxiety     Anxiety and depression     Last Assessed: 4/17/2017    Depression     No meds currently   Last Assessed: 9/24/2015    Dysuria     Last Assessed: 8/1/2016    Endometriosis     Last Assessed: 1/23/2015    Fatigue     Last Assessed: 9/24/2015  Granulation tissue at vaginal vault     Last Assessed: 2/6/2017    Irregular menses     Migraines     Pelvic pain in female     Left pelvic area   Trichomoniasis     Resolved: 5/18/2017     Vaginal spotting     Last Assessed: 1/30/2017     Past Surgical History:   Procedure Laterality Date    APPENDECTOMY  10/06/2016    HYSTERECTOMY      LAPAROSCOPIC ENDOMETRIOSIS FULGURATION  2012    PELVIC LAPAROSCOPY      [place] LAP,APPENDECTOMY N/A 10/6/2016    Procedure: APPENDECTOMY LAPAROSCOPIC;  Surgeon: Lizandro Guerrero MD;  [facility];  Service: General    [place] LAPAROSCOPY W TOT HYSTERECTUTERUS <=[address] TUBE/OVARY N/A 7/25/2016    Procedure: HYSTERECTOMY LAPAROSCOPIC TOTAL  LSO  right salpingectomy  diagnostic cystoscopy;  Surgeon: Charlotte Knutson MD;  [facility];  Service: Gynecology    TUBAL LIGATION  03/2015    VAGINAL HYSTERECTOMY      Last Assessed: 9/16/2016     Current Outpatient Medications   Medication Sig Dispense Refill    ALPRAZolam (XANAX) 0 25 mg tablet Take 1 tablet (0 25 mg total) by mouth daily at bedtime as needed for anxiety 20 tablet 0    citalopram (CeleXA) 20 mg tablet Take 1 tablet (20 mg total) by mouth daily 30 tablet 2    dexamethasone (DECADRON) 2 mg tablet 1 tab qam with food prn migraine  5 tablet 0    gabapentin (NEURONTIN) 400 mg capsule 1 capsule BY MOUTH AT BEDTIME  90 capsule 3    ketorolac (TORADOL) 10 mg tablet Take 1 tablet (10 mg total) by mouth every 6 (six) hours as needed (migraine) Max 2-3 per week  Take with food/ antacid  10 tablet 2    Multiple Vitamins-Minerals (MULTIVITAL) tablet Take 1 tablet by mouth daily       No current facility-administered medications for this visit  No Known Allergies    Review of Systems   Constitutional: Positive for fatigue and fever (100 3)  Negative for activity change, appetite change, chills, diaphoresis and unexpected weight change  HENT: Positive for congestion and rhinorrhea   Negative for ear pain, postnasal drip, sinus pressure, sinus pain, sneezing, sore throat, tinnitus and voice change  Eyes: Negative for pain, redness and visual disturbance  Respiratory: Negative for cough, chest tightness, shortness of breath and wheezing  Cardiovascular: Negative for chest pain, palpitations and leg swelling  Gastrointestinal: Negative for abdominal pain, blood in stool, constipation, diarrhea, nausea and vomiting  Genitourinary: Negative for difficulty urinating, dysuria, frequency, hematuria and urgency  Musculoskeletal: Negative for arthralgias, back pain, gait problem, joint swelling, myalgias, neck pain and neck stiffness  Skin: Negative for color change, pallor, rash and wound  Neurological: Negative for dizziness, tremors, weakness, light-headedness and headaches  Psychiatric/Behavioral: Negative for dysphoric mood, self-injury, sleep disturbance and suicidal ideas  The patient is not nervous/anxious  Objective:    Vitals:    01/27/21 1225   Temp: 99 6 °F (37 6 °C)   Weight: 68 kg (150 lb)   Height: 5' 7" (1 702 m)       VIRTUAL VISIT DISCLAIMER    Marbella LUBA Garcia acknowledges that she has consented to an online visit or consultation  She understands that the online visit is based solely on information provided by her, and that, in the absence of a face-to-face physical evaluation by the physician, the diagnosis she receives is both limited and provisional in terms of accuracy and completeness  This is not intended to replace a full medical face-to-face evaluation by the physician  Dory Baumann understands and accepts these terms

## 2021-01-29 DIAGNOSIS — Z20.822 EXPOSURE TO COVID-19 VIRUS: ICD-10-CM

## 2021-01-29 PROCEDURE — 87635 SARS-COV-2 COVID-19 AMP PRB: CPT | Performed by: PHYSICIAN ASSISTANT

## 2021-01-30 LAB — SARS-COV-2 RNA RESP QL NAA+PROBE: NEGATIVE

## 2021-02-12 ENCOUNTER — TELEPHONE (OUTPATIENT)
Dept: NEUROLOGY | Facility: CLINIC | Age: 31
End: 2021-02-12

## 2021-02-12 DIAGNOSIS — G43.009 MIGRAINE WITHOUT AURA AND WITHOUT STATUS MIGRAINOSUS, NOT INTRACTABLE: ICD-10-CM

## 2021-02-12 RX ORDER — DEXAMETHASONE 2 MG/1
TABLET ORAL
Qty: 5 TABLET | Refills: 0 | Status: SHIPPED | OUTPATIENT
Start: 2021-02-12 | End: 2021-08-25 | Stop reason: ALTCHOICE

## 2021-02-12 NOTE — TELEPHONE ENCOUNTER
pt called and states that she is starting to get bad headaches again  she called pcp and they asked if she got COVID test, this was about 3 weeks ago  they ordered a rapid COVID test which was negative  she is still complaining of headahce/stuffiness  they recommended that she get covid tested again, she was tested again which was negative  She has had this migraine for 4 days now  6-7/10  No other symptoms except feeling tired  ketorolac is effective but migraine comes back after 4-5 hours  gabapenitn 400mg 1 cap hs -  States that the last 2 days she woke up tired in the morning, not sure if related to gabapentin as she has been taking this for a while and hasn't had this in the past   She has not used her CPAP in a very long time  Has not taken any ketorolac today  She thinks that decadron was effective in dec when it was prescribed  She did not take benadryl in dec  She is agreeable to decadron again if that is what is recommended     Please advise  [phone] to leave detailed message

## 2021-03-08 DIAGNOSIS — G44.321 INTRACTABLE CHRONIC POST-TRAUMATIC HEADACHE: ICD-10-CM

## 2021-03-09 RX ORDER — KETOROLAC TROMETHAMINE 10 MG/1
10 TABLET, FILM COATED ORAL EVERY 6 HOURS PRN
Qty: 10 TABLET | Refills: 0 | Status: SHIPPED | OUTPATIENT
Start: 2021-03-09 | End: 2021-05-10 | Stop reason: SDUPTHER

## 2021-03-30 DIAGNOSIS — Z23 ENCOUNTER FOR IMMUNIZATION: ICD-10-CM

## 2021-04-01 ENCOUNTER — IMMUNIZATIONS (OUTPATIENT)
Dept: FAMILY MEDICINE CLINIC | Facility: HOSPITAL | Age: 31
End: 2021-04-01

## 2021-04-01 DIAGNOSIS — Z23 ENCOUNTER FOR IMMUNIZATION: Primary | ICD-10-CM

## 2021-04-01 PROCEDURE — 0011A SARS-COV-2 / COVID-19 MRNA VACCINE (MODERNA) 100 MCG: CPT

## 2021-04-01 PROCEDURE — 91301 SARS-COV-2 / COVID-19 MRNA VACCINE (MODERNA) 100 MCG: CPT

## 2021-04-28 ENCOUNTER — TELEPHONE (OUTPATIENT)
Dept: FAMILY MEDICINE CLINIC | Facility: CLINIC | Age: 31
End: 2021-04-28

## 2021-04-28 DIAGNOSIS — R30.0 DYSURIA: Primary | ICD-10-CM

## 2021-04-28 RX ORDER — CIPROFLOXACIN 500 MG/1
500 TABLET, FILM COATED ORAL EVERY 12 HOURS SCHEDULED
Qty: 10 TABLET | Refills: 0 | Status: SHIPPED | OUTPATIENT
Start: 2021-04-28 | End: 2021-05-03

## 2021-04-28 NOTE — TELEPHONE ENCOUNTER
Patient called states she has a UTI, unable to come in for an appt due to her work schedule  Asking if there is anything you suggest her taking at home or if she can run to the lab for a sample?

## 2021-04-28 NOTE — TELEPHONE ENCOUNTER
Pt to go to lab to leave sample  I'll call something in for her, but she needs to go to lab and leave sample prior to starting the antibiotic

## 2021-04-29 ENCOUNTER — APPOINTMENT (OUTPATIENT)
Dept: LAB | Facility: HOSPITAL | Age: 31
End: 2021-04-29
Payer: COMMERCIAL

## 2021-04-29 ENCOUNTER — IMMUNIZATIONS (OUTPATIENT)
Dept: FAMILY MEDICINE CLINIC | Facility: HOSPITAL | Age: 31
End: 2021-04-29

## 2021-04-29 DIAGNOSIS — R30.0 DYSURIA: ICD-10-CM

## 2021-04-29 DIAGNOSIS — Z23 ENCOUNTER FOR IMMUNIZATION: Primary | ICD-10-CM

## 2021-04-29 LAB
BACTERIA UR QL AUTO: ABNORMAL /HPF
BILIRUB UR QL STRIP: NEGATIVE
CLARITY UR: ABNORMAL
COLOR UR: YELLOW
GLUCOSE UR STRIP-MCNC: NEGATIVE MG/DL
HGB UR QL STRIP.AUTO: NEGATIVE
KETONES UR STRIP-MCNC: NEGATIVE MG/DL
LEUKOCYTE ESTERASE UR QL STRIP: ABNORMAL
NITRITE UR QL STRIP: POSITIVE
NON-SQ EPI CELLS URNS QL MICRO: ABNORMAL /HPF
PH UR STRIP.AUTO: 6 [PH]
PROT UR STRIP-MCNC: NEGATIVE MG/DL
RBC #/AREA URNS AUTO: ABNORMAL /HPF
SP GR UR STRIP.AUTO: >=1.03 (ref 1–1.03)
UROBILINOGEN UR QL STRIP.AUTO: 0.2 E.U./DL
WBC #/AREA URNS AUTO: ABNORMAL /HPF

## 2021-04-29 PROCEDURE — 0012A SARS-COV-2 / COVID-19 MRNA VACCINE (MODERNA) 100 MCG: CPT

## 2021-04-29 PROCEDURE — 91301 SARS-COV-2 / COVID-19 MRNA VACCINE (MODERNA) 100 MCG: CPT

## 2021-04-29 PROCEDURE — 87186 SC STD MICRODIL/AGAR DIL: CPT

## 2021-04-29 PROCEDURE — 81001 URINALYSIS AUTO W/SCOPE: CPT | Performed by: PHYSICIAN ASSISTANT

## 2021-04-29 PROCEDURE — 87077 CULTURE AEROBIC IDENTIFY: CPT

## 2021-04-29 PROCEDURE — 87086 URINE CULTURE/COLONY COUNT: CPT

## 2021-05-01 LAB — BACTERIA UR CULT: ABNORMAL

## 2021-05-10 DIAGNOSIS — G44.321 INTRACTABLE CHRONIC POST-TRAUMATIC HEADACHE: ICD-10-CM

## 2021-05-10 RX ORDER — KETOROLAC TROMETHAMINE 10 MG/1
10 TABLET, FILM COATED ORAL EVERY 6 HOURS PRN
Qty: 10 TABLET | Refills: 0 | Status: SHIPPED | OUTPATIENT
Start: 2021-05-10 | End: 2021-07-26 | Stop reason: SDUPTHER

## 2021-05-13 ENCOUNTER — OFFICE VISIT (OUTPATIENT)
Dept: NEUROLOGY | Facility: CLINIC | Age: 31
End: 2021-05-13
Payer: COMMERCIAL

## 2021-05-13 VITALS
BODY MASS INDEX: 22.66 KG/M2 | DIASTOLIC BLOOD PRESSURE: 60 MMHG | HEIGHT: 67 IN | HEART RATE: 84 BPM | SYSTOLIC BLOOD PRESSURE: 100 MMHG | WEIGHT: 144.4 LBS

## 2021-05-13 DIAGNOSIS — G44.321 INTRACTABLE CHRONIC POST-TRAUMATIC HEADACHE: Primary | ICD-10-CM

## 2021-05-13 DIAGNOSIS — G47.33 OSA (OBSTRUCTIVE SLEEP APNEA): ICD-10-CM

## 2021-05-13 DIAGNOSIS — G43.009 MIGRAINE WITHOUT AURA AND WITHOUT STATUS MIGRAINOSUS, NOT INTRACTABLE: ICD-10-CM

## 2021-05-13 PROCEDURE — 3008F BODY MASS INDEX DOCD: CPT | Performed by: PHYSICIAN ASSISTANT

## 2021-05-13 PROCEDURE — 1036F TOBACCO NON-USER: CPT | Performed by: PHYSICIAN ASSISTANT

## 2021-05-13 PROCEDURE — 99214 OFFICE O/P EST MOD 30 MIN: CPT | Performed by: PHYSICIAN ASSISTANT

## 2021-05-13 RX ORDER — KETOROLAC TROMETHAMINE 30 MG/ML
60 INJECTION, SOLUTION INTRAMUSCULAR; INTRAVENOUS ONCE
Status: DISCONTINUED | OUTPATIENT
Start: 2021-05-13 | End: 2021-05-13

## 2021-05-13 RX ORDER — AMITRIPTYLINE HYDROCHLORIDE 10 MG/1
TABLET, FILM COATED ORAL
Qty: 60 TABLET | Refills: 2 | Status: SHIPPED | OUTPATIENT
Start: 2021-05-13 | End: 2021-09-02 | Stop reason: SDUPTHER

## 2021-05-13 RX ORDER — KETOROLAC TROMETHAMINE 30 MG/ML
60 INJECTION, SOLUTION INTRAMUSCULAR; INTRAVENOUS ONCE
Status: COMPLETED | OUTPATIENT
Start: 2021-05-13 | End: 2021-05-13

## 2021-05-13 RX ADMIN — KETOROLAC TROMETHAMINE 60 MG: 30 INJECTION, SOLUTION INTRAMUSCULAR; INTRAVENOUS at 14:49

## 2021-05-13 NOTE — PROGRESS NOTES
Neurology      Rosalia Macias is a 27 y o  female  [id number]      Assessment/Recommendations:     Diagnoses and all orders for this visit:    Intractable chronic post-traumatic headache  -     amitriptyline (ELAVIL) 10 mg tablet; 1 tab qhs x 1 week, then Increase to 20 mg qhs if tolerated  -     Discontinue: ketorolac (TORADOL) injection 60 mg  -     ketorolac (TORADOL) 60 mg/2 mL IM injection 60 mg    Migraine without aura and without status migrainosus, not intractable  -     Discontinue: ketorolac (TORADOL) injection 60 mg  -     ketorolac (TORADOL) 60 mg/2 mL IM injection 60 mg    KAI (obstructive sleep apnea)         Migraines are worse  She will trial elavil, s/e reviewed, hopefully to assist with sleep as well  Toradol injection to day to break cycle  The patient should not hesitate to call me prior to her follow up with any questions or concerns  Chief Complaint:  Migraine  Patient states since last visit she is feeling the same  Subjective:  HPI   She recently graduated  The patient states she is getting more frequent migraine headaches, and she is unsure if gabapentin is helpful  Toradol does help at the onset of a migraine headache  She denies side effects to either of these medications  She states she gets a migraine headaches about every 3 days for the past 1 5 months  Typically 6-7/10 on average, some days are 3/10, some days are 5/10, at the apex of the head, she takes toradol when 7-8/10  She is unsure of triggers  She describes the headache as pounding, and it feels like she wants to hit her head off of a wall, sometimes it feels like pressure  Denies n/v, denies L and S sensitivity  States headaches have been worse sicne her accident as noted in prior notes  She endorses dizziness if she gets up too quick sometimes from sitting  She drinks 4-5 bottles per day (big bottles per her hx)   She drinks a little coffee every morning, some days drinks coke later in the day    Triggers- weather, cold (has to wear a hat or ears will start hurting)    S/p hysterectomy- 7/15/15, with h/o endometriosis  Still has only her R ovary  She stopped CPAP because she waking up in the middle of the night from it  Has mild KAI, feels well rested    Does acupuncture now for back pain  Prior documentation:  Since last seen the patient has been doing much better since increasing the dose of gabapentin from 300-400 mg q h s     She denies side effects  She has experienced reduced frequency and severity of migraine headaches since starting the gabapentin  She takes ibuprofen 2-3 times per week 800 mg, and Toradol if that fails  She knows when to take Toradol if it is going to be a severe migraine headache  Her biggest headache triggers are stress, 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  She currently has a lot of personal stressors:  She is taking care of her kids, while going to school for her associates degree, while learning her father's business        She weaned slowly off of Celexa on her own, and for about a month and a half now has been completely off of Celexa  She does not notice any worsening depression or anxiety or any withdrawal like symptoms       She started a birth control since last seen, about 1-2 months ago for hormone regulation:  Ortho Evra, which is a combination estrogen and progesterone therapy  She does not notice any side effects, worsening headaches, focal deficits, etc, since starting this  She states it was started for better hormone regulation because she had a hysterectomy and 1 ovary removed at the age of 22 for endometriosis  She has 1 ovary now      She is trying to maintain compliance with her CPAP  She does not like using it, but overall it is beneficial for her sleep function      Patient Active Problem List   Diagnosis    Anxiety and depression    Migraine without aura and without status migrainosus, not intractable    Intractable chronic post-traumatic headache    KAI (obstructive sleep apnea)    Excessive daytime sleepiness    Insomnia due to medical condition    Chronic post-traumatic headache, intractable     Past Medical History:   Diagnosis Date    Acute appendicitis with localized peritonitis     Acute otitis media, right 3/23/2019    Anxiety     Anxiety and depression     Last Assessed: 4/17/2017    Depression     No meds currently  Last Assessed: 9/24/2015    Dysuria     Last Assessed: 8/1/2016    Endometriosis     Last Assessed: 1/23/2015    Fatigue     Last Assessed: 9/24/2015    Granulation tissue at vaginal vault     Last Assessed: 2/6/2017    Irregular menses     Migraines     Pelvic pain in female     Left pelvic area   Trichomoniasis     Resolved: 5/18/2017     Vaginal spotting     Last Assessed: 1/30/2017     Past Surgical History:   Procedure Laterality Date    APPENDECTOMY  10/06/2016    HYSTERECTOMY      LAPAROSCOPIC ENDOMETRIOSIS FULGURATION  2012    PELVIC LAPAROSCOPY      NE LAP,APPENDECTOMY N/A 10/6/2016    Procedure: APPENDECTOMY LAPAROSCOPIC;  Surgeon: Lynda Mejias MD;  [facility];  Service: General    NE LAPAROSCOPY W TOT HYSTERECTUTERUS <=[address] TUBE/OVARY N/A 7/25/2016    Procedure: HYSTERECTOMY LAPAROSCOPIC TOTAL  LSO  right salpingectomy  diagnostic cystoscopy;  Surgeon: Aracely Blanc MD;  [facility];  Service: Gynecology    TUBAL LIGATION  03/2015    VAGINAL HYSTERECTOMY      Last Assessed: 9/16/2016     Current Outpatient Medications on File Prior to Visit   Medication Sig Dispense Refill    ALPRAZolam (XANAX) 0 25 mg tablet Take 1 tablet (0 25 mg total) by mouth daily at bedtime as needed for anxiety 20 tablet 0    gabapentin (NEURONTIN) 400 mg capsule 1 capsule BY MOUTH AT BEDTIME  90 capsule 3    ketorolac (TORADOL) 10 mg tablet Take 1 tablet (10 mg total) by mouth every 6 (six) hours as needed (migraine) Max 2-3 per week   Take with food/ antacid  10 tablet 0    Multiple Vitamins-Minerals (MULTIVITAL) tablet Take 1 tablet by mouth daily      dexamethasone (DECADRON) 2 mg tablet 1 tab qam with food prn migraine  (Patient not taking: Reported on 5/13/2021) 5 tablet 0     No current facility-administered medications on file prior to visit  No Known Allergies        ROS:  Review of Systems   Constitutional: Negative  Negative for appetite change and fever  HENT: Negative  Negative for hearing loss, tinnitus, trouble swallowing and voice change  Eyes: Negative  Negative for photophobia and pain  Respiratory: Negative  Negative for shortness of breath  Cardiovascular: Negative  Negative for palpitations  Gastrointestinal: Negative  Negative for nausea and vomiting  Endocrine: Negative  Negative for cold intolerance  Genitourinary: Negative  Negative for dysuria, frequency and urgency  Musculoskeletal: Negative  Negative for myalgias and neck pain  Skin: Negative  Negative for rash  Neurological: Positive for tremors, light-headedness and headaches  Negative for dizziness, seizures, syncope, facial asymmetry, speech difficulty, weakness and numbness  Patient states she has tremors in both hands  Patient states she is getting migraines every 3 days  Hematological: Negative  Does not bruise/bleed easily  Psychiatric/Behavioral: Negative  Negative for confusion, hallucinations and sleep disturbance  Objective:  /60 (BP Location: Right arm, Patient Position: Sitting, Cuff Size: Adult)   Pulse 84   Ht 5' 7" (1 702 m)   Wt 65 5 kg (144 lb 6 4 oz)   [facility]  [facility] Unknown) [facility]  BMI 22 62 kg/m²   Body mass index is 22 62 kg/m²  Physical Exam    Neurological Exam  On neurologic exam, the patient is alert and oriented to time and place  Speech is fluent and articulate, and the patient follows commands appropriately   Judgment and affect appear normal  Pupils are equally round and reactive to light and extraocular muscles are intact without nystagmus  Face is symmetric, and tongue, uvula, and palate are midline  Hearing is intact  Motor examination reveals intact strength throughout  Reflexes 2+ t/o and nonfocal  Normal gait is steady  I have personally reviewed pertinent films in PACS  The following portions of the patient's history were reviewed and updated as appropriate: allergies, current medications, family history, past medical history, social history and active problem list   Review of systems was reviewed and otherwise unremarkable from a neurological perspective  I have spent 30 minutes with the patient today in which greater than 50% of this time was spent in counseling and/or coordination of care regarding diagnoses, test results, impression, plan and potential medication side effects

## 2021-07-06 ENCOUNTER — TELEPHONE (OUTPATIENT)
Dept: FAMILY MEDICINE CLINIC | Facility: CLINIC | Age: 31
End: 2021-07-06

## 2021-07-06 NOTE — TELEPHONE ENCOUNTER
Pt called and left a voicemail that her heart rate was up to 207  Pt was concerned  Pt was advised to go to the ER  While speaking with the PT on the phone during the followup call, PT stated she was having chest pains  PT was advised to go to a ER

## 2021-07-08 ENCOUNTER — OFFICE VISIT (OUTPATIENT)
Dept: URGENT CARE | Facility: CLINIC | Age: 31
End: 2021-07-08
Payer: COMMERCIAL

## 2021-07-08 ENCOUNTER — APPOINTMENT (OUTPATIENT)
Dept: RADIOLOGY | Facility: CLINIC | Age: 31
End: 2021-07-08
Payer: COMMERCIAL

## 2021-07-08 VITALS
RESPIRATION RATE: 18 BRPM | SYSTOLIC BLOOD PRESSURE: 150 MMHG | HEART RATE: 82 BPM | OXYGEN SATURATION: 100 % | TEMPERATURE: 97.7 F | DIASTOLIC BLOOD PRESSURE: 72 MMHG

## 2021-07-08 DIAGNOSIS — M79.672 LEFT FOOT PAIN: ICD-10-CM

## 2021-07-08 DIAGNOSIS — M79.672 PAIN OF LEFT HEEL: ICD-10-CM

## 2021-07-08 DIAGNOSIS — T14.8XXA SKIN AVULSION: Primary | ICD-10-CM

## 2021-07-08 PROCEDURE — 12001 RPR S/N/AX/GEN/TRNK 2.5CM/<: CPT | Performed by: NURSE PRACTITIONER

## 2021-07-08 PROCEDURE — 90471 IMMUNIZATION ADMIN: CPT | Performed by: NURSE PRACTITIONER

## 2021-07-08 PROCEDURE — 73630 X-RAY EXAM OF FOOT: CPT

## 2021-07-08 PROCEDURE — 90715 TDAP VACCINE 7 YRS/> IM: CPT

## 2021-07-08 PROCEDURE — 73650 X-RAY EXAM OF HEEL: CPT

## 2021-07-08 PROCEDURE — 99214 OFFICE O/P EST MOD 30 MIN: CPT | Performed by: NURSE PRACTITIONER

## 2021-07-08 RX ORDER — CEPHALEXIN 500 MG/1
500 CAPSULE ORAL EVERY 12 HOURS SCHEDULED
Qty: 14 CAPSULE | Refills: 0 | Status: SHIPPED | OUTPATIENT
Start: 2021-07-08 | End: 2021-07-15

## 2021-07-08 NOTE — PATIENT INSTRUCTIONS
You have 5 sutures in your left heel  Apply bacitracin daily x 3 days only  Dress with dressing daily to avoid infection,  Wear sandals to prevent rubbing of sutures  Do not get wet x 24 hours - if you do, dry well  Take the cephalexin as prescribed  Remove sutures in 10-14 days  Return to Care now or see your PCP  Go to the ED if symptoms worsen  Follow up with your PCP    Care For Your Stitches   WHAT YOU NEED TO KNOW:   Stitches, or sutures, are used to close cuts and wounds on the skin  Stitches need to be removed after your wound has healed  DISCHARGE INSTRUCTIONS:   Return to the emergency department if:   · Your stitches come apart  · Blood soaks through your bandages  · You suddenly cannot move your injured joint  · You have sudden numbness around your wound  · You see red streaks coming from your wound  Contact your healthcare provider if:   · You have a fever and chills  · Your wound is red, warm, swollen, or leaking pus  · There is a bad smell coming from your wound  · You have increased pain in the wound area  · You have questions or concerns about your condition or care  Care for your stitches:   · Protect the stitches  You may need to cover your stitches with a bandage for 24 to 48 hours, or as directed  Do not bump or hit the suture area  This could open the wound  Do not trim or shorten the ends of your stitches  If they rub on your clothing, put a gauze bandage between the stitches and your clothes  · Clean the area as directed  Carefully wash your wound with soap and water  For mouth and lip wounds, rinse your mouth after meals and at bedtime  Ask your healthcare provider what to use to rinse your mouth  If you have a scalp wound, you may gently wash your hair every 2 days with mild shampoo  Do not use hair products, such as hair spray  Check your wound for signs of infection when you clean it  Signs include redness, swelling, and pus      · Keep the area dry as directed  Wait 12 to 24 hours after you receive your stitches before you take a shower  Take showers instead of baths  Do not take a bath or swim  Your healthcare provider will give you instructions for bathing with your stitches  Help your wound heal:   · Elevate your wound  Keep your wound above the level of your heart as often as you can  This will help decrease swelling and pain  Prop the area on pillows or blankets, if possible, to keep it elevated comfortably  · Limit activity  Do not stretch the skin around your wound  This will help prevent bleeding and swelling  Follow up with your healthcare provider as directed: You may need to return to have your stitches removed  Write down your questions so you remember to ask them during your visits  © Copyright [address] Information is for End User's use only and may not be sold, redistributed or otherwise used for commercial purposes  All illustrations and images included in CareNotes® are the copyrighted property of [employer] D A M , Inc  or [facility]  The above information is an  only  It is not intended as medical advice for individual conditions or treatments  Talk to your doctor, nurse or pharmacist before following any medical regimen to see if it is safe and effective for you  Laceration   WHAT YOU NEED TO KNOW:   A laceration is an injury to the skin and the soft tissue underneath it  Lacerations can happen anywhere on the body  DISCHARGE INSTRUCTIONS:   Return to the emergency department if:   · You have heavy bleeding or bleeding that does not stop after 10 minutes of holding firm, direct pressure over the wound  · Your wound opens up  Call your doctor if:   · You have a fever or chills  · Your laceration is red, warm, or swollen  · You have red streaks on your skin coming from your wound  · You have white or yellow drainage from the wound that smells bad      · You have pain that gets worse, even after treatment  · You have questions or concerns about your condition or care  Medicines: You may need any of the following:  · Prescription pain medicine  may be given  Ask your healthcare provider how to take this medicine safely  Some prescription pain medicines contain acetaminophen  Do not take other medicines that contain acetaminophen without talking to your healthcare provider  Too much acetaminophen may cause liver damage  Prescription pain medicine may cause constipation  Ask your healthcare provider how to prevent or treat constipation  · Antibiotics  help treat or prevent a bacterial infection  · Take your medicine as directed  Contact your healthcare provider if you think your medicine is not helping or if you have side effects  Tell him or her if you are allergic to any medicine  Keep a list of the medicines, vitamins, and herbs you take  Include the amounts, and when and why you take them  Bring the list or the pill bottles to follow-up visits  Carry your medicine list with you in case of an emergency  Care for your wound as directed:   · Do not get your wound wet  until your healthcare provider says it is okay  Do not soak your wound in water  Do not go swimming until your healthcare provider says it is okay  Carefully wash the wound with soap and water  Gently pat the area dry or allow it to air dry  · Change your bandages  when they get wet, dirty, or after washing  Apply new, clean bandages as directed  Do not apply elastic bandages or tape too tight  Do not put powders or lotions over your incision  · Apply antibiotic ointment as directed  Your healthcare provider may give you antibiotic ointment to put over your wound if you have stitches  If you have strips of tape over your incision, let them dry up and fall off on their own  If they do not fall off within 14 days, gently remove them  If you have glue over your wound, do not remove or pick at it   If your glue comes off, do not replace it with glue that you have at home  · Check your wound every day for signs of infection, such as swelling, redness, or pus  Self-care:   · Apply ice  on your wound for 15 to 20 minutes every hour or as directed  Use an ice pack, or put crushed ice in a plastic bag  Cover it with a towel  Ice helps prevent tissue damage and decreases swelling and pain  · Use a splint as directed  A splint will decrease movement and stress on your wound  It may help it heal faster  A splint may be used for lacerations over joints or areas of your body that bend  Ask your healthcare provider how to apply and remove a splint  · Decrease scarring of your wound  by applying ointments as directed  Do not apply ointments until your healthcare provider says it is okay  You may need to wait until your wound is healed  Ask which ointment to buy and how often to use it  After your wound is healed, use sunscreen over the area when you are out in the sun  You should do this for at least 6 months to 1 year after your injury  Follow up with your doctor as directed: You may need to follow up in 24 to 48 hours to have your wound checked for infection  You will need to return in 3 to 14 days if you have stitches or staples so they can be removed  Care for your wound as directed to prevent infection and help it heal  Write down your questions so you remember to ask them during your visits  © Copyright [address] Information is for End User's use only and may not be sold, redistributed or otherwise used for commercial purposes  All illustrations and images included in CareNotes® are the copyrighted property of [employer]  or [facility] Mykel Keen   The above information is an  only  It is not intended as medical advice for individual conditions or treatments  Talk to your doctor, nurse or pharmacist before following any medical regimen to see if it is safe and effective for you

## 2021-07-08 NOTE — PROGRESS NOTES
[address]        NAME: Huber Herman is a 27 y o  female  : 1990    MRN: [id number]  DATE: 2021  TIME: 3:46 PM    Assessment and Plan   Skin avulsion [T14  8XXA]  1  Skin avulsion  Tdap Vaccine greater than or equal to 8yo    cephalexin (KEFLEX) 500 mg capsule    left heel    2  Left foot pain  XR foot 3+ vw left    XR heel / calcaneus 2+ vw left   3  Pain of left heel  XR foot 3+ vw left    XR heel / calcaneus 2+ vw left         Patient Instructions       Follow up with PCP in 3-5 days  Proceed to  ER if symptoms worsen  You have 5 sutures in your left heel  Apply bacitracin daily x 3 days only  Dress with dressing daily to avoid infection,  Wear sandals to prevent rubbing of sutures  Do not get wet x 24 hours - if you do, dry well  Take the cephalexin as prescribed  Remove sutures in 10-14 days  Return to Care now or see your PCP  Go to the ED if symptoms worsen  Follow up with your PCP    Chief Complaint     Chief Complaint   Patient presents with    Laceration     Pt reports that a cart hit the pt in the back of her left foot  History of Present Illness       27year old female presenting or evaluation of a left heel laceration  She reports she was pulling a heavy cart at [address] when the cart ran into the back of her left ankle causing an avulsion to the left heel  She complaints of pain to posterior aspect of her heel and dorsal aspect of her left foot  Also reports paraesthesia to foot, sensation intact  Patient noted to be limping with assistance from family member  LNMP- hysterectomy, last tetanus  willing to update  Laceration         Review of Systems   Review of Systems   Constitutional: Negative for chills, fatigue and fever  HENT: Negative  Eyes: Negative  Respiratory: Negative  Gastrointestinal: Negative  Endocrine: Negative  Genitourinary: Negative  Musculoskeletal: Positive for gait problem  Skin: Positive for wound  Neurological: Positive for numbness (left foot)  Hematological: Negative  Psychiatric/Behavioral: Negative  Current Medications       Current Outpatient Medications:     ALPRAZolam (XANAX) 0 25 mg tablet, Take 1 tablet (0 25 mg total) by mouth daily at bedtime as needed for anxiety, Disp: 20 tablet, Rfl: 0    amitriptyline (ELAVIL) 10 mg tablet, 1 tab qhs x 1 week, then Increase to 20 mg qhs if tolerated  , Disp: 60 tablet, Rfl: 2    cephalexin (KEFLEX) 500 mg capsule, Take 1 capsule (500 mg total) by mouth every 12 (twelve) hours for 7 days, Disp: 14 capsule, Rfl: 0    dexamethasone (DECADRON) 2 mg tablet, 1 tab qam with food prn migraine  (Patient not taking: Reported on 5/13/2021), Disp: 5 tablet, Rfl: 0    gabapentin (NEURONTIN) 400 mg capsule, 1 capsule BY MOUTH AT BEDTIME , Disp: 90 capsule, Rfl: 3    ketorolac (TORADOL) 10 mg tablet, Take 1 tablet (10 mg total) by mouth every 6 (six) hours as needed (migraine) Max 2-3 per week  Take with food/ antacid , Disp: 10 tablet, Rfl: 0    Multiple Vitamins-Minerals (MULTIVITAL) tablet, Take 1 tablet by mouth daily, Disp: , Rfl:     Current Allergies     Allergies as of 07/08/2021    (No Known Allergies)            The following portions of the patient's history were reviewed and updated as appropriate: allergies, current medications, past family history, past medical history, past social history, past surgical history and problem list      Past Medical History:   Diagnosis Date    Acute appendicitis with localized peritonitis     Acute otitis media, right 3/23/2019    Anxiety     Anxiety and depression     Last Assessed: 4/17/2017    Depression     No meds currently   Last Assessed: 9/24/2015    Dysuria     Last Assessed: 8/1/2016    Endometriosis     Last Assessed: 1/23/2015    Fatigue     Last Assessed: 9/24/2015    Granulation tissue at vaginal vault     Last Assessed: 2/6/2017    Irregular menses     Migraines     Pelvic pain in female     Left pelvic area   Trichomoniasis     Resolved: 5/18/2017     Vaginal spotting     Last Assessed: 1/30/2017       Past Surgical History:   Procedure Laterality Date    APPENDECTOMY  10/06/2016    HYSTERECTOMY      LAPAROSCOPIC ENDOMETRIOSIS FULGURATION  2012    PELVIC LAPAROSCOPY      KS LAP,APPENDECTOMY N/A 10/6/2016    Procedure: APPENDECTOMY LAPAROSCOPIC;  Surgeon: Raeford Alpers, MD;  Location: [facility];  Service: General    KS LAPAROSCOPY W TOT HYSTERECTUTERUS <=[address] TUBE/OVARY N/A 7/25/2016    Procedure: HYSTERECTOMY LAPAROSCOPIC TOTAL  LSO  right salpingectomy  diagnostic cystoscopy;  Surgeon: Loan Liu MD;  Location: [facility];  Service: Gynecology    TUBAL LIGATION  03/2015    VAGINAL HYSTERECTOMY      Last Assessed: 9/16/2016       Family History   Problem Relation Age of Onset    Thyroid disease Mother     Sleep apnea Mother     Parkinsonism Paternal Grandfather          Medications have been verified  Objective   /72   Pulse 82   Temp 97 7 °F (36 5 °C)   Resp 18   LMP  (LMP Unknown) Comment: uhcg negative  SpO2 100%   No LMP recorded (lmp unknown)  Patient has had a hysterectomy  Physical Exam     Physical Exam  Vitals and nursing note reviewed  Constitutional:       General: She is not in acute distress  Appearance: Normal appearance  She is normal weight  She is not ill-appearing or toxic-appearing  HENT:      Head: Normocephalic and atraumatic  Right Ear: External ear normal       Left Ear: External ear normal       Nose: Nose normal       Mouth/Throat:      Mouth: Mucous membranes are moist       Pharynx: Oropharynx is clear  Eyes:      Extraocular Movements: Extraocular movements intact  Conjunctiva/sclera: Conjunctivae normal    Cardiovascular:      Rate and Rhythm: Normal rate and regular rhythm  Pulses: Normal pulses  Dorsalis pedis pulses are 2+ on the right side and 2+ on the left side  Posterior tibial pulses are 2+ on the right side and 2+ on the left side  Heart sounds: Normal heart sounds  Pulmonary:      Effort: Pulmonary effort is normal       Breath sounds: Normal breath sounds  Abdominal:      General: Abdomen is flat  Palpations: Abdomen is soft  Musculoskeletal:         General: Tenderness and signs of injury present  No swelling or deformity  Cervical back: Normal range of motion and neck supple  Left lower leg: No edema  Left ankle: Decreased range of motion  Left Achilles Tendon: Tenderness present  Padilla's test negative  Left foot: Laceration and tenderness (dorsal aspect of foot and posterior heel  ) present  Legs:    Skin:     General: Skin is warm  Findings: Signs of injury and wound present  Neurological:      General: No focal deficit present  Mental Status: She is alert and oriented to person, place, and time  Coordination: Coordination is intact  Gait: Gait abnormal (limping due to due L foot pain)  Psychiatric:         Mood and Affect: Mood normal          Behavior: Behavior normal          Thought Content: Thought content normal          Judgment: Judgment normal          Laceration repair    Date/Time: 7/8/2021 3:20 PM  Performed by: ELAINE Rousseau  Authorized by: ELAINE Rousseau   Consent: Verbal consent obtained  Written consent obtained  Risks and benefits: risks, benefits and alternatives were discussed  Consent given by: patient  Patient understanding: patient states understanding of the procedure being performed  Patient consent: the patient's understanding of the procedure matches consent given  Procedure consent: procedure consent matches procedure scheduled  Relevant documents: relevant documents present and verified  Test results: test results available and properly labeled  Site marked: the operative site was marked  Radiology Images displayed and confirmed   If images not available, report reviewed: imaging studies available  Required items: required blood products, implants, devices, and special equipment available  Patient identity confirmed: verbally with patient and hospital-assigned identification number  Body area: lower extremity  Location details: left ankle  Laceration length: 1 cm  Foreign bodies: no foreign bodies  Tendon involvement: none  Nerve involvement: none  Vascular damage: no  Anesthesia: local infiltration    Anesthesia:  Local Anesthetic: lidocaine 1% without epinephrine  Anesthetic total: 1 5 mL    Sedation:  Patient sedated: no      Wound Dehiscence:  Superficial Wound Dehiscence: simple closure      Procedure Details:  Preparation: Patient was prepped and draped in the usual sterile fashion  Irrigation solution: saline  Irrigation method: syringe  Amount of cleaning: standard  Debridement: none  Degree of undermining: none  Skin closure: 5-0 nylon  Number of sutures: 5  Technique: knot tying  Approximation: close  Approximation difficulty: simple  Dressing: antibiotic ointment, gauze roll and 4x4 sterile gauze  Patient tolerance: patient tolerated the procedure well with no immediate complications        Foot xray preliminary read negative for acute pathology- awaiting RAD read    Heel xray preliminary read negative for acute pathology- awaiting RAD read

## 2021-07-22 ENCOUNTER — OFFICE VISIT (OUTPATIENT)
Dept: URGENT CARE | Facility: CLINIC | Age: 31
End: 2021-07-22
Payer: COMMERCIAL

## 2021-07-22 VITALS
HEART RATE: 82 BPM | RESPIRATION RATE: 16 BRPM | HEIGHT: 67 IN | SYSTOLIC BLOOD PRESSURE: 119 MMHG | DIASTOLIC BLOOD PRESSURE: 70 MMHG | OXYGEN SATURATION: 99 % | WEIGHT: 144 LBS | TEMPERATURE: 98.6 F | BODY MASS INDEX: 22.6 KG/M2

## 2021-07-22 DIAGNOSIS — T14.8XXA AVULSION OF SKIN: Primary | ICD-10-CM

## 2021-07-22 DIAGNOSIS — Z48.02 ENCOUNTER FOR REMOVAL OF SUTURES: ICD-10-CM

## 2021-07-22 PROCEDURE — 99213 OFFICE O/P EST LOW 20 MIN: CPT | Performed by: PHYSICIAN ASSISTANT

## 2021-07-22 NOTE — PATIENT INSTRUCTIONS
Keep area clean and dry  Watch for signs of infection  Follow up with PCP in 3-5 days  Proceed to  ER if symptoms worsen  Stitches Removal   WHAT YOU NEED TO KNOW:   Stitches may need to be removed in 3 to 14 days depending on the location of your wound  Your healthcare provider will use sterile forceps or tweezers to  the knot of each stitch  He will cut the stitch with scissors and pull the stitch out  You may feel a slight tug as the stitch comes out  He may place small steristrips across your wound after the stitches have been removed  These pieces of tape will peel and fall of on their own  Do not pull them off  DISCHARGE INSTRUCTIONS:   Return to the emergency department if:   · Your wound splits open  · You suddenly cannot move your injured joint  · You have sudden numbness around your wound  · You see red streaks coming from your wound  Contact your healthcare provider if:   · You have a fever and chills  · Your wound is red, warm, swollen, or leaking pus  · There is a bad smell coming from your wound  · You have increased pain in the wound area  · You have questions or concerns about your condition or care  Care for your wound:   · Clean your wound as directed  Carefully wash your wound with soap and water  Pat the area dry with a clean towel  · Protect your wound  Your wound can swell, bleed, or split open if it is stretched or bumped  You may need to wear a bandage that supports your wound until it is completely healed  · Minimize your scar  Use sunblock if your wound is exposed to the sun  Apply it every day after the stitches are removed  This will help prevent skin discoloration  Follow up with your healthcare provider as directed: You may need to return in 3 to 5 days if the stitches are on your face  Stitches on your scalp need to be removed after 7 to 14 days  Stitches over joints may remain in place up to 14 days   Write down your questions so you remember to ask them during your visits  © 2017 [address] Information is for End User's use only and may not be sold, redistributed or otherwise used for commercial purposes  All illustrations and images included in CareNotes® are the copyrighted property of [employer]  or [employer]  The above information is an  only  It is not intended as medical advice for individual conditions or treatments  Talk to your doctor, nurse or pharmacist before following any medical regimen to see if it is safe and effective for you

## 2021-07-22 NOTE — PROGRESS NOTES
[employer] [facility]        NAME: Alvino Monge is a 27 y o  female  : 1990    MRN: [id number]  DATE: 2021  TIME: 9:34 AM    Assessment and Plan   Avulsion of skin [T14  8XXA]  1  Avulsion of skin  Suture removal   2  Encounter for removal of sutures  Suture removal         Patient Instructions     Keep area clean and dry  Watch for signs of infection  Follow up with PCP in 3-5 days  Proceed to  ER if symptoms worsen  Chief Complaint     Chief Complaint   Patient presents with    Suture / Staple Removal     here for suture removal on left heel area, placed 14 days ago, denies fever or s/s of infection  History of Present Illness       Patient had 5 sutures placed in L ankle on 21  Reports sharp pain worse with walking in L ankle  She has been wearing flip flops since the wound repair and changing her bandage regularly  Reports that she never wore flip flops before the injury  Suture / Staple Removal  The sutures were placed 11 to 14 days ago  She tried oral antibiotics since the wound repair  Her temperature was unmeasured prior to arrival  There has been no drainage from the wound  There is no redness present  There is no swelling present  There is new pain present  She has no difficulty moving the affected extremity or digit  Review of Systems   Review of Systems   Constitutional: Negative for chills and fever  Musculoskeletal: Negative for joint swelling  Skin: Negative for color change  Neurological: Negative for weakness and numbness  Current Medications       Current Outpatient Medications:     ALPRAZolam (XANAX) 0 25 mg tablet, Take 1 tablet (0 25 mg total) by mouth daily at bedtime as needed for anxiety, Disp: 20 tablet, Rfl: 0    amitriptyline (ELAVIL) 10 mg tablet, 1 tab qhs x 1 week, then Increase to 20 mg qhs if tolerated  , Disp: 60 tablet, Rfl: 2    gabapentin (NEURONTIN) 400 mg capsule, 1 capsule BY MOUTH AT BEDTIME , Disp: 90 capsule, Rfl: 3    ketorolac (TORADOL) 10 mg tablet, Take 1 tablet (10 mg total) by mouth every 6 (six) hours as needed (migraine) Max 2-3 per week  Take with food/ antacid , Disp: 10 tablet, Rfl: 0    Multiple Vitamins-Minerals (MULTIVITAL) tablet, Take 1 tablet by mouth daily, Disp: , Rfl:     dexamethasone (DECADRON) 2 mg tablet, 1 tab qam with food prn migraine  (Patient not taking: Reported on 5/13/2021), Disp: 5 tablet, Rfl: 0    Current Allergies     Allergies as of 07/22/2021    (No Known Allergies)            The following portions of the patient's history were reviewed and updated as appropriate: allergies, current medications, past family history, past medical history, past social history, past surgical history and problem list      Past Medical History:   Diagnosis Date    Acute appendicitis with localized peritonitis     Acute otitis media, right 3/23/2019    Anxiety     Anxiety and depression     Last Assessed: 4/17/2017    Depression     No meds currently  Last Assessed: 9/24/2015    Dysuria     Last Assessed: 8/1/2016    Endometriosis     Last Assessed: 1/23/2015    Fatigue     Last Assessed: 9/24/2015    Granulation tissue at vaginal vault     Last Assessed: 2/6/2017    Irregular menses     Migraines     Pelvic pain in female     Left pelvic area      Trichomoniasis     Resolved: 5/18/2017     Vaginal spotting     Last Assessed: 1/30/2017       Past Surgical History:   Procedure Laterality Date    APPENDECTOMY  10/06/2016    HYSTERECTOMY      LAPAROSCOPIC ENDOMETRIOSIS FULGURATION  2012    PELVIC LAPAROSCOPY      OR LAP,APPENDECTOMY N/A 10/6/2016    Procedure: APPENDECTOMY LAPAROSCOPIC;  Surgeon: Verona Brock MD;  Location: [facility];  Service: General    OR LAPAROSCOPY W TOT HYSTERECTUTERUS <=250 Ruthanna Irma TUBE/OVARY N/A 7/25/2016    Procedure: HYSTERECTOMY LAPAROSCOPIC TOTAL  LSO  right salpingectomy  diagnostic cystoscopy;  Surgeon: Dontrell Olmstead MD;  Location: [facility];  Service: Gynecology    TUBAL LIGATION  03/2015    VAGINAL HYSTERECTOMY      Last Assessed: 9/16/2016       Family History   Problem Relation Age of Onset    Thyroid disease Mother     Sleep apnea Mother     Parkinsonism Paternal Grandfather          Medications have been verified  Objective   /70 (BP Location: Left arm, Patient Position: Sitting, Cuff Size: Adult)   Pulse 82   Temp 98 6 °F (37 °C) (Temporal)   Resp 16   Ht 5' 7" (1 702 m)   Wt 65 3 kg (144 lb)   LMP  (LMP Unknown) Comment: uhcg negative  SpO2 99%   BMI 22 55 kg/m²   No LMP recorded (lmp unknown)  Patient has had a hysterectomy  Physical Exam     Physical Exam  Constitutional:       General: She is not in acute distress  Appearance: She is well-developed  Cardiovascular:      Rate and Rhythm: Normal rate and regular rhythm  Pulses: Normal pulses  Heart sounds: Normal heart sounds  No murmur heard  No friction rub  No gallop  Pulmonary:      Effort: Pulmonary effort is normal  No respiratory distress  Breath sounds: Normal breath sounds  No wheezing or rales  Chest:      Chest wall: No tenderness  Musculoskeletal:         General: No swelling or tenderness  Normal range of motion  Feet:    Skin:     General: Skin is warm  Capillary Refill: Capillary refill takes less than 2 seconds  Findings: No erythema  Neurological:      Mental Status: She is alert  Sensory: No sensory deficit  Psychiatric:         Behavior: Behavior normal          Thought Content: Thought content normal          Judgment: Judgment normal        Suture removal    Date/Time: 7/22/2021 9:25 AM  Performed by: Shivani Rtaliff PA-C  Authorized by: Shivani Ratliff PA-C   Universal Protocol:  Consent: Verbal consent obtained    Risks and benefits: risks, benefits and alternatives were discussed  Consent given by: patient  Patient identity confirmed: verbally with patient        Location: Anesthesia laterality: L ankle  Procedure details:      Tools used:  Suture removal kit    Wound appearance:  No sign(s) of infection    Number of sutures removed:  5

## 2021-07-28 ENCOUNTER — OFFICE VISIT (OUTPATIENT)
Dept: FAMILY MEDICINE CLINIC | Facility: CLINIC | Age: 31
End: 2021-07-28
Payer: COMMERCIAL

## 2021-07-28 VITALS
SYSTOLIC BLOOD PRESSURE: 132 MMHG | BODY MASS INDEX: 23.39 KG/M2 | DIASTOLIC BLOOD PRESSURE: 88 MMHG | OXYGEN SATURATION: 96 % | TEMPERATURE: 97.6 F | HEIGHT: 67 IN | WEIGHT: 149 LBS | HEART RATE: 94 BPM

## 2021-07-28 DIAGNOSIS — F41.9 ANXIETY: Primary | ICD-10-CM

## 2021-07-28 DIAGNOSIS — S91.312D LACERATION OF LEFT HEEL, SUBSEQUENT ENCOUNTER: ICD-10-CM

## 2021-07-28 PROCEDURE — 3008F BODY MASS INDEX DOCD: CPT | Performed by: PHYSICIAN ASSISTANT

## 2021-07-28 PROCEDURE — 1036F TOBACCO NON-USER: CPT | Performed by: PHYSICIAN ASSISTANT

## 2021-07-28 PROCEDURE — 3725F SCREEN DEPRESSION PERFORMED: CPT | Performed by: PHYSICIAN ASSISTANT

## 2021-07-28 PROCEDURE — 99213 OFFICE O/P EST LOW 20 MIN: CPT | Performed by: PHYSICIAN ASSISTANT

## 2021-07-28 RX ORDER — BUSPIRONE HYDROCHLORIDE 5 MG/1
5 TABLET ORAL 3 TIMES DAILY
Qty: 90 TABLET | Refills: 0 | Status: SHIPPED | OUTPATIENT
Start: 2021-07-28 | End: 2021-08-31 | Stop reason: SDUPTHER

## 2021-07-28 NOTE — PROGRESS NOTES
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  Would consider PT after wound is completely healed if still having pain  Will start buspar for anxiety, follow up in 1 month or sooner PRN  Subjective:      Patient ID: Graciela Maldonado is a 27 y o  female  Dhara Martinez is here today 3 weeks after injury to L heel, lacerated on shopping cart  Went to ER, had imaging done (negative for acute injury) as well as sutures  Sutures removed and patient still having pain  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  Also, complaining of increased anxiety over past few weeks  Depression "is good right now "       The following portions of the patient's history were reviewed and updated as appropriate:   She has a past medical history of Acute appendicitis with localized peritonitis, Acute otitis media, right (3/23/2019), Anxiety, Anxiety and depression, Depression, Dysuria, Endometriosis, Fatigue, Granulation tissue at vaginal vault, Irregular menses, Migraines, Pelvic pain in female, Trichomoniasis, and Vaginal spotting ,  does not have any pertinent problems on file  ,   has a past surgical history that includes Laparoscopy; Tubal ligation (03/2015); Hysterectomy; pr lap,appendectomy (N/A, 10/6/2016); pr laparoscopy w tot hysterectuterus <=250 gram  w tube/ovary (N/A, 7/25/2016); Appendectomy (10/06/2016); Laparoscopic endometriosis fulguration (2012); and Vaginal hysterectomy  ,  family history includes Parkinsonism in her paternal grandfather; Sleep apnea in her mother; Thyroid disease in her mother  ,   reports that she quit smoking about 7 years ago  Her smoking use included cigarettes  She quit after 5 00 years of use  She has never used smokeless tobacco  She reports current alcohol use  She reports that she does not use drugs  ,  has No Known Allergies     Current Outpatient Medications   Medication Sig Dispense Refill    ALPRAZolam (XANAX) 0 25 mg tablet Take 1 tablet (0 25 mg total) by mouth daily at bedtime as needed for anxiety 20 tablet 0    amitriptyline (ELAVIL) 10 mg tablet 1 tab qhs x 1 week, then Increase to 20 mg qhs if tolerated  60 tablet 2    gabapentin (NEURONTIN) 400 mg capsule 1 capsule BY MOUTH AT BEDTIME  90 capsule 3    ketorolac (TORADOL) 10 mg tablet Take 1 tablet (10 mg total) by mouth every 6 (six) hours as needed (migraine) Max 2-3 per week  Take with food/ antacid  10 tablet 2    Multiple Vitamins-Minerals (MULTIVITAL) tablet Take 1 tablet by mouth daily      busPIRone (BUSPAR) 5 mg tablet Take 1 tablet (5 mg total) by mouth 3 (three) times a day 90 tablet 0    dexamethasone (DECADRON) 2 mg tablet 1 tab qam with food prn migraine  (Patient not taking: Reported on 5/13/2021) 5 tablet 0     No current facility-administered medications for this visit  Review of Systems   Constitutional: Negative for activity change, appetite change, chills, diaphoresis, fatigue, fever and unexpected weight change  HENT: Negative for congestion, ear pain, postnasal drip, rhinorrhea, sinus pressure, sinus pain, sneezing, sore throat, tinnitus and voice change  Eyes: Negative for pain, redness and visual disturbance  Respiratory: Negative for cough, chest tightness, shortness of breath and wheezing  Cardiovascular: Negative for chest pain, palpitations and leg swelling  Gastrointestinal: Negative for abdominal pain, blood in stool, constipation, diarrhea, nausea and vomiting     Genitourinary: Negative for difficulty urinating, dysuria, frequency, hematuria and urgency  Musculoskeletal: Negative for arthralgias, back pain, gait problem, joint swelling, myalgias, neck pain and neck stiffness  Skin: Positive for wound  Negative for color change, pallor and rash  Neurological: Negative for dizziness, tremors, weakness, light-headedness and headaches  Psychiatric/Behavioral: Negative for dysphoric mood, self-injury, sleep disturbance and suicidal ideas  The patient is nervous/anxious  Objective:  Vitals:    07/28/21 1049   BP: 132/88   Pulse: 94   Temp: 97 6 °F (36 4 °C)   SpO2: 96%   Weight: 67 6 kg (149 lb)   Height: 5' 7" (1 702 m)     Body mass index is 23 34 kg/m²  Physical Exam  Vitals reviewed  Constitutional:       General: She is not in acute distress  Appearance: She is well-developed  She is not diaphoretic  HENT:      Head: Normocephalic and atraumatic  Right Ear: Hearing, tympanic membrane, ear canal and external ear normal       Left Ear: Hearing, tympanic membrane, ear canal and external ear normal       Mouth/Throat:      Pharynx: Uvula midline  No oropharyngeal exudate  Eyes:      General: No scleral icterus  Right eye: No discharge  Left eye: No discharge  Conjunctiva/sclera: Conjunctivae normal    Neck:      Thyroid: No thyromegaly  Vascular: No carotid bruit  Cardiovascular:      Rate and Rhythm: Normal rate and regular rhythm  Heart sounds: Normal heart sounds  No murmur heard  Pulmonary:      Effort: Pulmonary effort is normal  No respiratory distress  Breath sounds: Normal breath sounds  No wheezing  Abdominal:      General: Bowel sounds are normal  There is no distension  Palpations: Abdomen is soft  There is no mass  Tenderness: There is no abdominal tenderness  There is no guarding or rebound  Musculoskeletal:         General: No tenderness  Normal range of motion  Cervical back: Neck supple     Lymphadenopathy: Cervical: No cervical adenopathy  Skin:     General: Skin is warm and dry  Findings: No erythema or rash  Comments: L heel laceration located over Achilles tendon insertion point healing, wound did open since sutures removed  No signs of infection, healing slowly  + TTP   Neurological:      Mental Status: She is alert and oriented to person, place, and time  Psychiatric:         Behavior: Behavior normal          Thought Content:  Thought content normal          Judgment: Judgment normal

## 2021-08-24 ENCOUNTER — TELEPHONE (OUTPATIENT)
Dept: OBGYN CLINIC | Facility: MEDICAL CENTER | Age: 31
End: 2021-08-24

## 2021-08-24 ENCOUNTER — TELEPHONE (OUTPATIENT)
Dept: FAMILY MEDICINE CLINIC | Facility: CLINIC | Age: 31
End: 2021-08-24

## 2021-08-24 NOTE — TELEPHONE ENCOUNTER
Patient has been having issues sleeping this last month, thought it had to do with the new medication she was recently prescribed, but now she has been starting to have hot flashes and night sweats  Patient did have hysterectomy 5 years ago, thinking she is starting menopause  Called GYN and they are not able to get her in for 1 month  She feels she is not able to wait that long due to not sleeping  She does have an appt scheduled for tomorrow but was going to cancel since I suggested she discuss this with GYN  Please advise

## 2021-08-24 NOTE — TELEPHONE ENCOUNTER
She should keep her follow-up appointment with GYN, they are going to be able to provide her with better insight into her hysterectomy and when they would expect her to go through menopause  If she wants to be seen, I will still see her

## 2021-08-25 ENCOUNTER — OFFICE VISIT (OUTPATIENT)
Dept: FAMILY MEDICINE CLINIC | Facility: CLINIC | Age: 31
End: 2021-08-25
Payer: COMMERCIAL

## 2021-08-25 ENCOUNTER — LAB (OUTPATIENT)
Dept: LAB | Facility: HOSPITAL | Age: 31
End: 2021-08-25
Payer: COMMERCIAL

## 2021-08-25 VITALS
OXYGEN SATURATION: 95 % | HEART RATE: 83 BPM | WEIGHT: 151.6 LBS | HEIGHT: 67 IN | BODY MASS INDEX: 23.79 KG/M2 | TEMPERATURE: 97.8 F | SYSTOLIC BLOOD PRESSURE: 112 MMHG | DIASTOLIC BLOOD PRESSURE: 66 MMHG

## 2021-08-25 DIAGNOSIS — N95.1 HOT FLASHES DUE TO MENOPAUSE: ICD-10-CM

## 2021-08-25 DIAGNOSIS — G47.00 INSOMNIA, UNSPECIFIED TYPE: Primary | ICD-10-CM

## 2021-08-25 DIAGNOSIS — G47.00 INSOMNIA, UNSPECIFIED TYPE: ICD-10-CM

## 2021-08-25 LAB
ALBUMIN SERPL BCP-MCNC: 4.6 G/DL (ref 3.5–5)
ALP SERPL-CCNC: 55 U/L (ref 46–116)
ALT SERPL W P-5'-P-CCNC: 21 U/L (ref 12–78)
ANION GAP SERPL CALCULATED.3IONS-SCNC: 11 MMOL/L (ref 4–13)
AST SERPL W P-5'-P-CCNC: 12 U/L (ref 5–45)
BASOPHILS # BLD AUTO: 0.03 THOUSANDS/ΜL (ref 0–0.1)
BASOPHILS NFR BLD AUTO: 1 % (ref 0–1)
BILIRUB SERPL-MCNC: 0.46 MG/DL (ref 0.2–1)
BUN SERPL-MCNC: 16 MG/DL (ref 5–25)
CALCIUM SERPL-MCNC: 9.2 MG/DL (ref 8.3–10.1)
CHLORIDE SERPL-SCNC: 103 MMOL/L (ref 100–108)
CO2 SERPL-SCNC: 27 MMOL/L (ref 21–32)
CREAT SERPL-MCNC: 0.9 MG/DL (ref 0.6–1.3)
EOSINOPHIL # BLD AUTO: 0.05 THOUSAND/ΜL (ref 0–0.61)
EOSINOPHIL NFR BLD AUTO: 1 % (ref 0–6)
ERYTHROCYTE [DISTWIDTH] IN BLOOD BY AUTOMATED COUNT: 11.9 % (ref 11.6–15.1)
FSH SERPL-ACNC: 19.3 MIU/ML
GFR SERPL CREATININE-BSD FRML MDRD: 86 ML/MIN/1.73SQ M
GLUCOSE SERPL-MCNC: 91 MG/DL (ref 65–140)
HCT VFR BLD AUTO: 37.7 % (ref 34.8–46.1)
HGB BLD-MCNC: 13.2 G/DL (ref 11.5–15.4)
IMM GRANULOCYTES # BLD AUTO: 0.01 THOUSAND/UL (ref 0–0.2)
IMM GRANULOCYTES NFR BLD AUTO: 0 % (ref 0–2)
LYMPHOCYTES # BLD AUTO: 2.06 THOUSANDS/ΜL (ref 0.6–4.47)
LYMPHOCYTES NFR BLD AUTO: 36 % (ref 14–44)
MCH RBC QN AUTO: 31.8 PG (ref 26.8–34.3)
MCHC RBC AUTO-ENTMCNC: 35 G/DL (ref 31.4–37.4)
MCV RBC AUTO: 91 FL (ref 82–98)
MONOCYTES # BLD AUTO: 0.43 THOUSAND/ΜL (ref 0.17–1.22)
MONOCYTES NFR BLD AUTO: 7 % (ref 4–12)
NEUTROPHILS # BLD AUTO: 3.2 THOUSANDS/ΜL (ref 1.85–7.62)
NEUTS SEG NFR BLD AUTO: 55 % (ref 43–75)
NRBC BLD AUTO-RTO: 0 /100 WBCS
PLATELET # BLD AUTO: 224 THOUSANDS/UL (ref 149–390)
PMV BLD AUTO: 11.6 FL (ref 8.9–12.7)
POTASSIUM SERPL-SCNC: 4.2 MMOL/L (ref 3.5–5.3)
PROT SERPL-MCNC: 8.1 G/DL (ref 6.4–8.2)
RBC # BLD AUTO: 4.15 MILLION/UL (ref 3.81–5.12)
SODIUM SERPL-SCNC: 141 MMOL/L (ref 136–145)
TSH SERPL DL<=0.05 MIU/L-ACNC: 1.46 UIU/ML (ref 0.36–3.74)
WBC # BLD AUTO: 5.78 THOUSAND/UL (ref 4.31–10.16)

## 2021-08-25 PROCEDURE — 80053 COMPREHEN METABOLIC PANEL: CPT

## 2021-08-25 PROCEDURE — 36415 COLL VENOUS BLD VENIPUNCTURE: CPT

## 2021-08-25 PROCEDURE — 85025 COMPLETE CBC W/AUTO DIFF WBC: CPT

## 2021-08-25 PROCEDURE — 99214 OFFICE O/P EST MOD 30 MIN: CPT | Performed by: PHYSICIAN ASSISTANT

## 2021-08-25 PROCEDURE — 83001 ASSAY OF GONADOTROPIN (FSH): CPT

## 2021-08-25 PROCEDURE — 84443 ASSAY THYROID STIM HORMONE: CPT

## 2021-08-25 NOTE — PROGRESS NOTES
Assessment/Plan:    Problem List Items Addressed This Visit     None      Visit Diagnoses     Insomnia, unspecified type    -  Primary    Relevant Orders    TSH, 3rd generation with Free T4 reflex (Completed)    Follicle stimulating hormone    CBC and differential (Completed)    Comprehensive metabolic panel (Completed)    Hot flashes due to menopause        Relevant Orders    TSH, 3rd generation with Free T4 reflex (Completed)    Follicle stimulating hormone    CBC and differential (Completed)    Comprehensive metabolic panel (Completed)           Diagnoses and all orders for this visit:    Insomnia, unspecified type  -     TSH, 3rd generation with Free T4 reflex; Future  -     Follicle stimulating hormone; Future  -     CBC and differential; Future  -     Comprehensive metabolic panel; Future    Hot flashes due to menopause  -     TSH, 3rd generation with Free T4 reflex; Future  -     Follicle stimulating hormone; Future  -     CBC and differential; Future  -     Comprehensive metabolic panel; Future        Will start a workup to see if hot flashes are definitely caused by her being in menopause or if there is a thyroid problem  Labs ordered  She will keep follow-up with Marissa Womack for next Tuesday as scheduled  Subjective:      Patient ID: Radha Loja is a 27 y o  female  Gloria Gonzalez is a 27year old female who is here today with concerns of not sleeping  She has a history of endometriosis  8 years ago she was given Lupron injections, which imitated menopause  The symptoms that she is experiencing is exactly how she felt during that time  She is not able to sleep, irritable, mood swings, sweats, and hot flashes  6 years ago she had a hysterectomy  She had everything removed with the exception of one ovary  She was told that she would likely go into early menopause  She has tried OTC melatonin, essential oils, and NyQuil  The only thing that helped was the NyQuil she took last night   This has been going on for about 1 week  The sleep disturbances are going on for about 2 months  She is prescribed xanax, but that does not make her tired  Tammy Zamora recently started her on Buspar, which has been helping with her anxiety  She called her GYN, but they can't get her in for another month  She also has thyroid problems that run in her family, so she wanted to make sure that was not the issue  The following portions of the patient's history were reviewed and updated as appropriate:   She has a past medical history of Acute appendicitis with localized peritonitis, Acute otitis media, right (3/23/2019), Anxiety, Anxiety and depression, Depression, Dysuria, Endometriosis, Fatigue, Granulation tissue at vaginal vault, Irregular menses, Migraines, Pelvic pain in female, Trichomoniasis, and Vaginal spotting ,  does not have any pertinent problems on file  ,   has a past surgical history that includes Laparoscopy; Tubal ligation (03/2015); Hysterectomy; pr lap,appendectomy (N/A, 10/6/2016); pr laparoscopy w tot hysterectuterus <=250 gram  w tube/ovary (N/A, 7/25/2016); Appendectomy (10/06/2016); Laparoscopic endometriosis fulguration (2012); and Vaginal hysterectomy  ,  family history includes Parkinsonism in her paternal grandfather; Sleep apnea in her mother; Thyroid disease in her mother  ,   reports that she quit smoking about 7 years ago  Her smoking use included cigarettes  She quit after 5 00 years of use  She has never used smokeless tobacco  She reports current alcohol use  She reports that she does not use drugs  ,  has No Known Allergies     Current Outpatient Medications   Medication Sig Dispense Refill    ALPRAZolam (XANAX) 0 25 mg tablet Take 1 tablet (0 25 mg total) by mouth daily at bedtime as needed for anxiety 20 tablet 0    amitriptyline (ELAVIL) 10 mg tablet 1 tab qhs x 1 week, then Increase to 20 mg qhs if tolerated   60 tablet 2    busPIRone (BUSPAR) 5 mg tablet Take 1 tablet (5 mg total) by mouth 3 (three) times a day 90 tablet 0    gabapentin (NEURONTIN) 400 mg capsule 1 capsule BY MOUTH AT BEDTIME  90 capsule 3    ketorolac (TORADOL) 10 mg tablet Take 1 tablet (10 mg total) by mouth every 6 (six) hours as needed (migraine) Max 2-3 per week  Take with food/ antacid  10 tablet 2    Multiple Vitamins-Minerals (MULTIVITAL) tablet Take 1 tablet by mouth daily       No current facility-administered medications for this visit  Review of Systems   Constitutional: Positive for diaphoresis  Negative for chills, fatigue and fever  HENT: Negative for congestion, ear pain, postnasal drip, rhinorrhea, sneezing, sore throat and trouble swallowing  Eyes: Negative for pain and visual disturbance  Respiratory: Negative for apnea, cough, shortness of breath and wheezing  Cardiovascular: Negative for chest pain and palpitations  Gastrointestinal: Negative for abdominal pain, constipation, diarrhea, nausea and vomiting  Genitourinary: Negative for dysuria and hematuria  Musculoskeletal: Negative for arthralgias, gait problem and myalgias  Neurological: Negative for dizziness, syncope, weakness, light-headedness, numbness and headaches  Psychiatric/Behavioral: Positive for decreased concentration, dysphoric mood and sleep disturbance  Negative for suicidal ideas  The patient is nervous/anxious  Objective:  Vitals:    08/25/21 1440   BP: 112/66   Pulse: 83   Temp: 97 8 °F (36 6 °C)   SpO2: 95%   Weight: 68 8 kg (151 lb 9 6 oz)   Height: 5' 7" (1 702 m)     Body mass index is 23 74 kg/m²  Physical Exam  Vitals and nursing note reviewed  Constitutional:       Appearance: She is well-developed  HENT:      Head: Normocephalic and atraumatic  Right Ear: Tympanic membrane, ear canal and external ear normal       Left Ear: Tympanic membrane, ear canal and external ear normal       Nose: Nose normal       Mouth/Throat:      Pharynx: No oropharyngeal exudate or posterior oropharyngeal erythema     Eyes: Extraocular Movements: Extraocular movements intact  Cardiovascular:      Rate and Rhythm: Normal rate and regular rhythm  Heart sounds: Normal heart sounds  No murmur heard  No friction rub  No gallop  Pulmonary:      Effort: Pulmonary effort is normal  No respiratory distress  Breath sounds: Normal breath sounds  No wheezing or rales  Musculoskeletal:         General: Normal range of motion  Cervical back: Normal range of motion and neck supple  Lymphadenopathy:      Cervical: No cervical adenopathy  Skin:     General: Skin is warm and dry  Neurological:      Mental Status: She is alert and oriented to person, place, and time  Psychiatric:         Mood and Affect: Mood is anxious  Behavior: Behavior normal          Thought Content:  Thought content normal          Judgment: Judgment normal

## 2021-08-31 ENCOUNTER — OFFICE VISIT (OUTPATIENT)
Dept: FAMILY MEDICINE CLINIC | Facility: CLINIC | Age: 31
End: 2021-08-31
Payer: COMMERCIAL

## 2021-08-31 VITALS
DIASTOLIC BLOOD PRESSURE: 68 MMHG | OXYGEN SATURATION: 97 % | BODY MASS INDEX: 23.89 KG/M2 | HEIGHT: 67 IN | WEIGHT: 152.2 LBS | SYSTOLIC BLOOD PRESSURE: 110 MMHG | TEMPERATURE: 98.2 F | HEART RATE: 94 BPM

## 2021-08-31 DIAGNOSIS — F41.9 ANXIETY: Primary | ICD-10-CM

## 2021-08-31 DIAGNOSIS — F32.A ANXIETY AND DEPRESSION: ICD-10-CM

## 2021-08-31 DIAGNOSIS — F41.9 ANXIETY AND DEPRESSION: ICD-10-CM

## 2021-08-31 PROCEDURE — 99213 OFFICE O/P EST LOW 20 MIN: CPT | Performed by: PHYSICIAN ASSISTANT

## 2021-08-31 RX ORDER — BUSPIRONE HYDROCHLORIDE 5 MG/1
5 TABLET ORAL 3 TIMES DAILY
Qty: 270 TABLET | Refills: 0 | Status: SHIPPED | OUTPATIENT
Start: 2021-08-31 | End: 2021-09-15 | Stop reason: SDUPTHER

## 2021-08-31 RX ORDER — ALPRAZOLAM 0.25 MG/1
0.25 TABLET ORAL
Qty: 20 TABLET | Refills: 0 | Status: SHIPPED | OUTPATIENT
Start: 2021-08-31

## 2021-08-31 NOTE — PROGRESS NOTES
Assessment/Plan:    Problem List Items Addressed This Visit        Other    Anxiety and depression    Relevant Medications    busPIRone (BUSPAR) 5 mg tablet    ALPRAZolam (XANAX) 0 25 mg tablet      Other Visit Diagnoses     Anxiety    -  Primary    Relevant Medications    busPIRone (BUSPAR) 5 mg tablet           Diagnoses and all orders for this visit:    Anxiety  -     busPIRone (BUSPAR) 5 mg tablet; Take 1 tablet (5 mg total) by mouth 3 (three) times a day    Anxiety and depression  -     ALPRAZolam (XANAX) 0 25 mg tablet; Take 1 tablet (0 25 mg total) by mouth daily at bedtime as needed for anxiety        No problem-specific Assessment & Plan notes found for this encounter  Subjective:      Patient ID: Anand Lan is a 27 y o  female  Nash Spindle returns today to discuss anxiety  Has noticed a big improvement in anxiety since starting buspar  Would like to continue medication  The following portions of the patient's history were reviewed and updated as appropriate:   She has a past medical history of Acute appendicitis with localized peritonitis, Acute otitis media, right (3/23/2019), Anxiety, Anxiety and depression, Depression, Dysuria, Endometriosis, Fatigue, Granulation tissue at vaginal vault, Irregular menses, Migraines, Pelvic pain in female, Trichomoniasis, and Vaginal spotting ,  does not have any pertinent problems on file  ,   has a past surgical history that includes Laparoscopy; Tubal ligation (03/2015); Hysterectomy; pr lap,appendectomy (N/A, 10/6/2016); pr laparoscopy w tot hysterectuterus <=250 gram  w tube/ovary (N/A, 7/25/2016); Appendectomy (10/06/2016); Laparoscopic endometriosis fulguration (2012); and Vaginal hysterectomy  ,  family history includes Parkinsonism in her paternal grandfather; Sleep apnea in her mother; Thyroid disease in her mother  ,   reports that she quit smoking about 7 years ago  Her smoking use included cigarettes  She quit after 5 00 years of use   She has never used smokeless tobacco  She reports current alcohol use  She reports that she does not use drugs  ,  has No Known Allergies     Current Outpatient Medications   Medication Sig Dispense Refill    ALPRAZolam (XANAX) 0 25 mg tablet Take 1 tablet (0 25 mg total) by mouth daily at bedtime as needed for anxiety 20 tablet 0    amitriptyline (ELAVIL) 10 mg tablet 1 tab qhs x 1 week, then Increase to 20 mg qhs if tolerated  60 tablet 2    busPIRone (BUSPAR) 5 mg tablet Take 1 tablet (5 mg total) by mouth 3 (three) times a day 270 tablet 0    gabapentin (NEURONTIN) 400 mg capsule 1 capsule BY MOUTH AT BEDTIME  90 capsule 3    ketorolac (TORADOL) 10 mg tablet Take 1 tablet (10 mg total) by mouth every 6 (six) hours as needed (migraine) Max 2-3 per week  Take with food/ antacid  10 tablet 2    Multiple Vitamins-Minerals (MULTIVITAL) tablet Take 1 tablet by mouth daily       No current facility-administered medications for this visit  Review of Systems   Constitutional: Positive for fatigue  Negative for activity change, appetite change, chills, diaphoresis, fever and unexpected weight change  HENT: Negative for congestion, ear pain, postnasal drip, rhinorrhea, sinus pressure, sinus pain, sneezing, sore throat, tinnitus and voice change  Eyes: Negative for pain, redness and visual disturbance  Respiratory: Negative for cough, chest tightness, shortness of breath and wheezing  Cardiovascular: Negative for chest pain, palpitations and leg swelling  Gastrointestinal: Negative for abdominal pain, blood in stool, constipation, diarrhea, nausea and vomiting  Genitourinary: Negative for difficulty urinating, dysuria, frequency, hematuria and urgency  Musculoskeletal: Negative for arthralgias, back pain, gait problem, joint swelling, myalgias, neck pain and neck stiffness  Skin: Negative for color change, pallor, rash and wound     Neurological: Negative for dizziness, tremors, weakness, light-headedness and headaches  Psychiatric/Behavioral: Positive for sleep disturbance  Negative for dysphoric mood, self-injury and suicidal ideas  The patient is not nervous/anxious  Objective:  Vitals:    08/31/21 1546   BP: 110/68   Pulse: 94   Temp: 98 2 °F (36 8 °C)   SpO2: 97%   Weight: 69 kg (152 lb 3 2 oz)   Height: 5' 7" (1 702 m)     Body mass index is 23 84 kg/m²  Physical Exam  Vitals reviewed  Constitutional:       General: She is not in acute distress  Appearance: She is well-developed  She is not diaphoretic  HENT:      Head: Normocephalic and atraumatic  Right Ear: Hearing, tympanic membrane, ear canal and external ear normal       Left Ear: Hearing, tympanic membrane, ear canal and external ear normal       Mouth/Throat:      Pharynx: Uvula midline  No oropharyngeal exudate  Eyes:      General: No scleral icterus  Right eye: No discharge  Left eye: No discharge  Conjunctiva/sclera: Conjunctivae normal    Neck:      Thyroid: No thyromegaly  Vascular: No carotid bruit  Cardiovascular:      Rate and Rhythm: Normal rate and regular rhythm  Heart sounds: Normal heart sounds  No murmur heard  Pulmonary:      Effort: Pulmonary effort is normal  No respiratory distress  Breath sounds: Normal breath sounds  No wheezing  Abdominal:      General: Bowel sounds are normal  There is no distension  Palpations: Abdomen is soft  There is no mass  Tenderness: There is no abdominal tenderness  There is no guarding or rebound  Musculoskeletal:         General: No tenderness  Normal range of motion  Cervical back: Neck supple  Lymphadenopathy:      Cervical: No cervical adenopathy  Skin:     General: Skin is warm and dry  Findings: No erythema or rash  Neurological:      Mental Status: She is alert and oriented to person, place, and time  Psychiatric:         Behavior: Behavior normal          Thought Content:  Thought content normal  Judgment: Judgment normal

## 2021-09-02 ENCOUNTER — TELEMEDICINE (OUTPATIENT)
Dept: NEUROLOGY | Facility: CLINIC | Age: 31
End: 2021-09-02
Payer: COMMERCIAL

## 2021-09-02 VITALS — HEIGHT: 67 IN | BODY MASS INDEX: 23.86 KG/M2 | WEIGHT: 152 LBS

## 2021-09-02 DIAGNOSIS — G44.329 CHRONIC POST-TRAUMATIC HEADACHE: ICD-10-CM

## 2021-09-02 DIAGNOSIS — G47.33 OSA (OBSTRUCTIVE SLEEP APNEA): ICD-10-CM

## 2021-09-02 DIAGNOSIS — G43.009 MIGRAINE WITHOUT AURA AND WITHOUT STATUS MIGRAINOSUS, NOT INTRACTABLE: Primary | ICD-10-CM

## 2021-09-02 PROCEDURE — 99214 OFFICE O/P EST MOD 30 MIN: CPT | Performed by: PHYSICIAN ASSISTANT

## 2021-09-02 RX ORDER — AMITRIPTYLINE HYDROCHLORIDE 10 MG/1
TABLET, FILM COATED ORAL
Qty: 90 TABLET | Refills: 3 | Status: SHIPPED | OUTPATIENT
Start: 2021-09-02 | End: 2021-11-03

## 2021-09-02 RX ORDER — KETOROLAC TROMETHAMINE 10 MG/1
10 TABLET, FILM COATED ORAL EVERY 6 HOURS PRN
Qty: 10 TABLET | Refills: 2 | Status: SHIPPED | OUTPATIENT
Start: 2021-09-02 | End: 2021-10-11 | Stop reason: HOSPADM

## 2021-09-02 NOTE — PROGRESS NOTES
Virtual Regular Visit    Verification of patient location:    Patient is located in the following state in which I hold an active license [place]      Assessment/Plan:    Problem List Items Addressed This Visit        Respiratory    KAI (obstructive sleep apnea)       Cardiovascular and Mediastinum    Migraine without aura and without status migrainosus, not intractable - Primary    Relevant Medications    amitriptyline (ELAVIL) 10 mg tablet    ketorolac (TORADOL) 10 mg tablet       Other    Chronic post-traumatic headache, intractable    Relevant Medications    amitriptyline (ELAVIL) 10 mg tablet    ketorolac (TORADOL) 10 mg tablet          Amitriptyline is effective at reducing migraine headaches  She still has at least 1 severe migraine per week  She was agreeable to increase amitriptyline from 20-30 mg, while keeping a journal of her heart rate  If the heart rate runs above 100 beats per minute in 2-3 consecutive days she should let me know  She has not had this issue so far  She has not had any other side effects from the medication  I reviewed the potential common side effects with this and she will let me know if any occur after the dose increase  She can continue 400 mg gabapentin q h s , with keeping in mind the option to stop this in the future if amitriptyline alone is adequate  At the onset of a severe migraine she will use Toradol which is helpful  She should try to avoid more than 2-3 doses analgesic per week to prevent medication overuse headache and other adverse events  Scheduled with gynecology for ?hot flashes/early menopause in the setting of prior hysterectomy  She has 1 ovary  She feels that hot flashes, insomnia and anxiety are all related to abnormal hormone levels and she is eager to meet with gynecology to have a discussion about this  She is also having more headaches due to stress of waiting for this visit      The patient should not hesitate to call me prior to her follow up with any questions or concerns  Reason for visit is   Chief Complaint   Patient presents with    Virtual Regular Visit        Encounter provider Linh Dutton PA-C    Provider located at [address] [id number]      Recent Visits  Date Type Provider Dept   09/03/21 Telephone Eda Huerta PA-C [facility]   09/02/21 [address] [place] [address] [facility] [place]   08/31/21 Office Visit Angela Valle PA-C [facility]   Showing recent visits within past 7 days and meeting all other requirements  Future Appointments  No visits were found meeting these conditions  Showing future appointments within next 150 days and meeting all other requirements       The patient was identified by name and date of birth  Karol Patrick was informed that this is a telemedicine visit and that the visit is being conducted through [address] [place] [address] and patient was informed that this is a secure, HIPAA-compliant platform  She agrees to proceed     My office door was closed  No one else was in the room  She acknowledged consent and understanding of privacy and security of the video platform  The patient has agreed to participate and understands they can discontinue the visit at any time  Patient is aware this is a billable service  Subjective  Karol Patrick is a 27 y o  female who is contacted via telemedicine for neurological follow-up  HPI     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 as a  soon  She reports she is waiting to see gynecology soon to discuss hot flashes, insomnia, worsening anxiety secondary to possible menopause symptoms  She had a hysterectomy in the past and she only has 1 ovary  While she is waiting for this appointment she feels that her headaches are getting worse due to stress  She gets a headache once a week    She calls it a headache attack  It sticks with her in [address]  Sometimes it lasts 3-4 hours  It mainly happens in the afternoon/ evening, possibly around 4:00 p m  Aisha Stock She takes ibuprofen which helps lower the pain severity  The headache sometimes gets worse around 7:00 p m  One night on a Saturday she was debating whether not to go to the hospital due to the headache  Sometimes she uses Toradol which typically usually helps  She describes headaches as still at the apex of the head, no clear change in character since we last saw her  There is no nausea or vomiting, typically no light or sound sensitivity  One night she did have some light sound sensitivity and place an ice pack on her neck which was helpful  Night was really she did have some light and sound send ice pack on neck   She does not think the amitriptyline is causing insomnia, and she does not think she has any side effects to it  She feels that overall it is beneficial I reducing her headaches  She also kept taking gabapentin  For insomnia melatonin did not help, and then she found an over-the-counter sleep aid which are gummy bears but she does not remember what is in it  These help her thankfully  Prior note: The patient states she is getting more frequent migraine headaches, and she is unsure if gabapentin is helpful  Toradol does help at the onset of a migraine headache  She denies side effects to either of these medications      She states she gets a migraine headaches about every 3 days for the past 1 5 months  Typically 6-7/10 on average, some days are 3/10, some days are 5/10, at the apex of the head, she takes toradol when 7-8/10  She is unsure of triggers  She describes the headache as pounding, and it feels like she wants to hit her head off of a wall, sometimes it feels like pressure  Denies n/v, denies L and S sensitivity   States headaches have been worse sicne her accident as noted in prior notes      She endorses dizziness if she gets up too quick sometimes from sitting  She drinks 4-5 bottles per day (big bottles per her hx)  She drinks a little coffee every morning, some days drinks coke later in the day     Triggers- weather, cold (has to wear a hat or ears will start hurting)     S/p hysterectomy- 7/15/15, with h/o endometriosis  Still has only her R ovary      She stopped CPAP because she waking up in the middle of the night from it  Has mild KAI, feels well rested     Does acupuncture now for back pain      Prior documentation:  Since last seen the patient has been doing much better since increasing the dose of gabapentin from 300-400 mg q h s  Ngoc Yanes denies side effects   She has experienced reduced frequency and severity of migraine headaches since starting the gabapentin   She takes ibuprofen 2-3 times per week 800 mg, and Toradol if that fails   She knows when to take Toradol if it is going to be a severe migraine headache  Cobrain biggest headache triggers are stress, 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  Chantelle Gonzalez currently has a lot of personal stressors: Chantelle Gonzalez is taking care of her kids, while going to school for her associates degree, while learning her father's business        She weaned slowly off of Celexa on her own, and for about a month and a half now has been completely off of Celexa   She does not notice any worsening depression or anxiety or any withdrawal like symptoms       She started a birth control since last seen, about 1-2 months ago for hormone regulation:  Ortho Evra, which is a combination estrogen and progesterone therapy   She does not notice any side effects, worsening headaches, focal deficits, etc, since starting this  Chantelle Gonzalez states it was started for better hormone regulation because she had a hysterectomy and 1 ovary removed at the age of 22 for endometriosis   She has 1 ovary now      She is trying to maintain compliance with her CPAP   She does not like using it, but overall it is beneficial for her sleep function  Past Medical History:   Diagnosis Date    Acute appendicitis with localized peritonitis     Acute otitis media, right 3/23/2019    Anxiety     Anxiety and depression     Last Assessed: 4/17/2017    Depression     No meds currently  Last Assessed: 9/24/2015    Dysuria     Last Assessed: 8/1/2016    Endometriosis     Last Assessed: 1/23/2015    Fatigue     Last Assessed: 9/24/2015    Granulation tissue at vaginal vault     Last Assessed: 2/6/2017    Irregular menses     Migraines     Pelvic pain in female     Left pelvic area   Trichomoniasis     Resolved: 5/18/2017     Vaginal spotting     Last Assessed: 1/30/2017       Past Surgical History:   Procedure Laterality Date    APPENDECTOMY  10/06/2016    HYSTERECTOMY      LAPAROSCOPIC ENDOMETRIOSIS FULGURATION  2012    PELVIC LAPAROSCOPY      IA LAP,APPENDECTOMY N/A 10/6/2016    Procedure: APPENDECTOMY LAPAROSCOPIC;  Surgeon: Corey Fernandez MD;  [facility];  Service: General    IA LAPAROSCOPY W TOT HYSTERECTUTERUS <=250 Jonny Fanning TUBE/OVARY N/A 7/25/2016    Procedure: HYSTERECTOMY LAPAROSCOPIC TOTAL  LSO  right salpingectomy  diagnostic cystoscopy;  Surgeon: Bushra Trinidad MD;  [facility];  Service: Gynecology    TUBAL LIGATION  03/2015    VAGINAL HYSTERECTOMY      Last Assessed: 9/16/2016       Current Outpatient Medications   Medication Sig Dispense Refill    ALPRAZolam (XANAX) 0 25 mg tablet Take 1 tablet (0 25 mg total) by mouth daily at bedtime as needed for anxiety 20 tablet 0    amitriptyline (ELAVIL) 10 mg tablet 3 tabs qhs 90 tablet 3    busPIRone (BUSPAR) 5 mg tablet Take 1 tablet (5 mg total) by mouth 3 (three) times a day 270 tablet 0    gabapentin (NEURONTIN) 400 mg capsule 1 capsule BY MOUTH AT BEDTIME  90 capsule 3    ketorolac (TORADOL) 10 mg tablet Take 1 tablet (10 mg total) by mouth every 6 (six) hours as needed (migraine) Max 2-3 per week  Take with food/ antacid  10 tablet 2    Multiple Vitamins-Minerals (MULTIVITAL) tablet Take 1 tablet by mouth daily       No current facility-administered medications for this visit  No Known Allergies    Review of Systems   Constitutional: Negative  Negative for appetite change and fever  HENT: Negative  Negative for hearing loss, tinnitus, trouble swallowing and voice change  Eyes: Negative  Negative for photophobia and pain  Respiratory: Negative  Negative for shortness of breath  Cardiovascular: Negative  Negative for palpitations  Gastrointestinal: Negative  Negative for nausea and vomiting  Endocrine: Negative  Negative for cold intolerance  Genitourinary: Negative  Negative for dysuria, frequency and urgency  Musculoskeletal: Negative  Negative for myalgias and neck pain  Skin: Negative  Negative for rash  Neurological: Positive for headaches  Negative for dizziness, tremors, seizures, syncope, facial asymmetry, speech difficulty, weakness, light-headedness and numbness  Hematological: Negative  Does not bruise/bleed easily  Psychiatric/Behavioral: Negative for confusion and hallucinations  Trouble falling asleep     The following portions of the patient's history were reviewed and updated as appropriate: allergies, current medications/ medication history, past family history, past medical history, past social history, past surgical history and problem list     Review of systems was reviewed and otherwise unremarkable from a neurological perspective  Video Exam    Vitals:    09/02/21 1017   Weight: 68 9 kg (152 lb)   Height: 5' 7" (1 702 m)   Body mass index is 23 81 kg/m²  Physical Exam   Neurological exam:  On neurologic exam, the patient is alert and oriented to time and place  Speech is fluent and articulate, and the patient follows commands appropriately  Judgment and affect appear normal   Extraocular muscles are intact without nystagmus  Face is symmetric   Hearing is intact bilaterally  Motor examination reveals adequate range of motion  I spent 30 minutes directly with the patient during this visit    VIRTUAL VISIT DISCLAIMER      Dee Shannon verbally agrees to participate in [employer]  Pt is aware that Virtual Care Services could be limited without vital signs or the ability to perform a full hands-on physical exam  Marbella Garcia understands she or the provider may request at any time to terminate the video visit and request the patient to seek care or treatment in person

## 2021-09-03 ENCOUNTER — TELEPHONE (OUTPATIENT)
Dept: NEUROLOGY | Facility: CLINIC | Age: 31
End: 2021-09-03

## 2021-09-03 NOTE — TELEPHONE ENCOUNTER
----- Message from Mary Brumfield PA-C sent at 9/3/2021  8:28 AM EDT -----  Regarding: f/u  6 months ok  If pt asks for earlier okay to do 4 months  Thanks

## 2021-09-03 NOTE — TELEPHONE ENCOUNTER
Scheduled 6m fu with [address] on 3/3/2021 1:45PM at [facility]  Declined AVS        ----- Message from Usman Marrero PA-C sent at 9/3/2021  8:28 AM EDT -----  Regarding: f/u  6 months ok  If pt asks for earlier okay to do 4 months  Thanks

## 2021-09-08 ENCOUNTER — OFFICE VISIT (OUTPATIENT)
Dept: OBGYN CLINIC | Facility: MEDICAL CENTER | Age: 31
End: 2021-09-08
Payer: COMMERCIAL

## 2021-09-08 VITALS — WEIGHT: 150 LBS | DIASTOLIC BLOOD PRESSURE: 70 MMHG | BODY MASS INDEX: 23.49 KG/M2 | SYSTOLIC BLOOD PRESSURE: 122 MMHG

## 2021-09-08 DIAGNOSIS — N95.1 PERIMENOPAUSAL: Primary | ICD-10-CM

## 2021-09-08 PROCEDURE — 99213 OFFICE O/P EST LOW 20 MIN: CPT | Performed by: OBSTETRICS & GYNECOLOGY

## 2021-09-08 NOTE — PROGRESS NOTES
[facility] [address] [place] [address]    Assessment/Plan:  No problem-specific Assessment & Plan notes found for this encounter  Diagnoses and all orders for this visit:    Perimenopausal  -     Estradiol; Future  -     Prolactin; Future      Subjective:   Nan Cruz is a 27 y o   female  CC: hot flashes/pelvic pain    HPI: HPI  Patient presents for discussion of pelvic pain, hot flashes, and difficulty sleeping  She recently had an [address] drawn and it was found to be 19 5  she has a known history of endometriosis  She is s/p TLH, LSO  She continues to have RLQ pain, cyclic  She states the pain is debilitating  She has tried using continuous patch, which has helped with the pain but made her depression worse  We discussed removing the ovary, which would leave her in surgical menopause  We reviewed her levels, which are in perimenopausal range  We will check other labs to evaluate  All questions answered  We discussed using estrogen for protection of bones and heart after her procedure  ROS: Review of Systems   Constitutional: Negative  Hot flashes     HENT: Negative  Eyes: Negative  Respiratory: Negative  Cardiovascular: Negative  Gastrointestinal: Negative  Genitourinary: Positive for pelvic pain  Musculoskeletal: Negative  All other systems reviewed and are negative  PFSH: The following portions of the patient's history were reviewed and updated as appropriate: allergies, current medications, past family history, past medical history, obstetric history, gynecologic history, past social history, past surgical history and problem list        Objective:  /70   Wt 68 kg (150 lb)   LMP  (LMP Unknown) Comment: uhcg negative  BMI 23 49 kg/m²    Physical Exam  Vitals reviewed  Constitutional:       Appearance: Normal appearance  Cardiovascular:      Rate and Rhythm: Normal rate     Pulmonary:      Effort: Pulmonary effort is normal  No respiratory distress  Neurological:      Mental Status: She is alert     Psychiatric:         Mood and Affect: Mood normal          Behavior: Behavior normal

## 2021-09-09 ENCOUNTER — LAB (OUTPATIENT)
Dept: LAB | Facility: HOSPITAL | Age: 31
End: 2021-09-09
Payer: COMMERCIAL

## 2021-09-09 DIAGNOSIS — N95.1 PERIMENOPAUSAL: ICD-10-CM

## 2021-09-09 LAB
ESTRADIOL SERPL-MCNC: 88 PG/ML
PROLACTIN SERPL-MCNC: 6.6 NG/ML

## 2021-09-09 PROCEDURE — 82670 ASSAY OF TOTAL ESTRADIOL: CPT

## 2021-09-09 PROCEDURE — 84146 ASSAY OF PROLACTIN: CPT

## 2021-09-09 PROCEDURE — 36415 COLL VENOUS BLD VENIPUNCTURE: CPT

## 2021-09-13 ENCOUNTER — TELEPHONE (OUTPATIENT)
Dept: FAMILY MEDICINE CLINIC | Facility: CLINIC | Age: 31
End: 2021-09-13

## 2021-09-13 DIAGNOSIS — F41.9 ANXIETY: ICD-10-CM

## 2021-09-13 NOTE — TELEPHONE ENCOUNTER
Patient is having surgery on 10/12 to get ovary removed, she doesn't think the anxiety medication is working, she has 3 panic attacks since  She is not sure if there is something else you can give her until she gets through the surgery? Please advise

## 2021-09-15 RX ORDER — BUSPIRONE HYDROCHLORIDE 7.5 MG/1
7.5 TABLET ORAL 3 TIMES DAILY
Qty: 270 TABLET | Refills: 0 | Status: SHIPPED | OUTPATIENT
Start: 2021-09-15 | End: 2022-01-18 | Stop reason: SDUPTHER

## 2021-09-15 NOTE — TELEPHONE ENCOUNTER
Pt was calling again because she had not heard from the office and she is still having multiple anxiety attacks and med is not working

## 2021-09-17 ENCOUNTER — TELEPHONE (OUTPATIENT)
Dept: OBGYN CLINIC | Facility: MEDICAL CENTER | Age: 31
End: 2021-09-17

## 2021-09-17 NOTE — TELEPHONE ENCOUNTER
I contacted pt's insurance about her upcoming sx  Effective 1/1/17 and active  [id number], [id number] and [id number] do not need PA  Pt will be covered at 100%  Sammie 9/17/21 at [address] [place] [address]

## 2021-09-24 ENCOUNTER — OFFICE VISIT (OUTPATIENT)
Dept: URGENT CARE | Facility: CLINIC | Age: 31
End: 2021-09-24
Payer: COMMERCIAL

## 2021-09-24 VITALS
TEMPERATURE: 98.6 F | WEIGHT: 150 LBS | BODY MASS INDEX: 23.54 KG/M2 | HEIGHT: 67 IN | OXYGEN SATURATION: 100 % | HEART RATE: 89 BPM | RESPIRATION RATE: 18 BRPM

## 2021-09-24 DIAGNOSIS — B34.9 VIRAL ILLNESS: Primary | ICD-10-CM

## 2021-09-24 PROCEDURE — 99213 OFFICE O/P EST LOW 20 MIN: CPT | Performed by: PHYSICIAN ASSISTANT

## 2021-09-24 PROCEDURE — U0005 INFEC AGEN DETEC AMPLI PROBE: HCPCS | Performed by: PHYSICIAN ASSISTANT

## 2021-09-24 PROCEDURE — U0003 INFECTIOUS AGENT DETECTION BY NUCLEIC ACID (DNA OR RNA); SEVERE ACUTE RESPIRATORY SYNDROME CORONAVIRUS 2 (SARS-COV-2) (CORONAVIRUS DISEASE [COVID-19]), AMPLIFIED PROBE TECHNIQUE, MAKING USE OF HIGH THROUGHPUT TECHNOLOGIES AS DESCRIBED BY CMS-2020-01-R: HCPCS | Performed by: PHYSICIAN ASSISTANT

## 2021-09-24 NOTE — PROGRESS NOTES
[address]          NAME: Alvino Monge is a 27 y o  female  : 1990    MRN: [id number]  DATE: 2021  TIME: 6:43 PM    Assessment and Plan   Viral illness [B34 9]  1  Viral illness  Novel Coronavirus (Covid-19),PCR [facility] - Office Collection       Patient Instructions   You can take vitamin D3 2000 IU daily, vitamin-C 1 g every 12 hours, and a daily multivitamin  Please check your sugars more frequently  Call your primary care provider and schedule a follow-up tele visit within the next 3 days  Follow CDC guidelines for self quarantine as discussed  [address]    Your healthcare provider and/or public health staff have evaluated you and have determined that you do not need to remain in the hospital at this time  At this time you can be isolated at home where you will be monitored by staff from your local or state health department  You should carefully follow the prevention and isolation steps below until a healthcare provider or local or state health department says that you can return to your normal activities  Stay home except to get medical care    People who are mildly ill with COVID-19 are able to isolate at home during their illness  You should restrict activities outside your home, except for getting medical care  Do not go to work, school, or public areas  Avoid using public transportation, ride-sharing, or taxis  Separate yourself from other people and animals in your home    People: As much as possible, you should stay in a specific room and away from other people in your home  Also, you should use a separate bathroom, if available  Animals: You should restrict contact with pets and other animals while you are sick with COVID-19, just like you would around other people   Although there have not been reports of pets or other animals becoming sick with COVID-19, it is still recommended that people sick with COVID-19 limit contact with animals until more information is known about the virus  When possible, have another member of your household care for your animals while you are sick  If you are sick with COVID-19, avoid contact with your pet, including petting, snuggling, being kissed or licked, and sharing food  If you must care for your pet or be around animals while you are sick, wash your hands before and after you interact with pets and wear a facemask  See COVID-19 and Animals for more information  Call ahead before visiting your doctor    If you have a medical appointment, call the healthcare provider and tell them that you have or may have COVID-19  This will help the healthcare providers office take steps to keep other people from getting infected or exposed  Wear a facemask    You should wear a facemask when you are around other people (e g , sharing a room or vehicle) or pets and before you enter a healthcare providers office  If you are not able to wear a facemask (for example, because it causes trouble breathing), then people who live with you should not stay in the same room with you, or they should wear a facemask if they enter your room  Cover your coughs and sneezes    Cover your mouth and nose with a tissue when you cough or sneeze  Throw used tissues in a lined trash can  Immediately wash your hands with soap and water for at least 20 seconds or, if soap and water are not available, clean your hands with an alcohol-based hand  that contains at least 60% alcohol  Clean your hands often    Wash your hands often with soap and water for at least 20 seconds, especially after blowing your nose, coughing, or sneezing; going to the bathroom; and before eating or preparing food  If soap and water are not readily available, use an alcohol-based hand  with at least 60% alcohol, covering all surfaces of your hands and rubbing them together until they feel dry    Soap and water are the best option if hands are visibly dirty  Avoid touching your eyes, nose, and mouth with unwashed hands  Avoid sharing personal household items    You should not share dishes, drinking glasses, cups, eating utensils, towels, or bedding with other people or pets in your home  After using these items, they should be washed thoroughly with soap and water  Clean all high-touch surfaces everyday    High touch surfaces include counters, tabletops, doorknobs, bathroom fixtures, toilets, phones, keyboards, tablets, and bedside tables  Also, clean any surfaces that may have blood, stool, or body fluids on them  Use a household cleaning spray or wipe, according to the label instructions  Labels contain instructions for safe and effective use of the cleaning product including precautions you should take when applying the product, such as wearing gloves and making sure you have good ventilation during use of the product  Monitor your symptoms    Seek prompt medical attention if your illness is worsening (e g , difficulty breathing)  Before seeking care, call your healthcare provider and tell them that you have, or are being evaluated for, COVID-19  Put on a facemask before you enter the facility  These steps will help the healthcare providers office to keep other people in the office or waiting room from getting infected or exposed  Ask your healthcare provider to call the local or state health department  Persons who are placed under active monitoring or facilitated self-monitoring should follow instructions provided by their local health department or occupational health professionals, as appropriate  If you have a medical emergency and need to call 911, notify the dispatch personnel that you have, or are being evaluated for COVID-19  If possible, put on a facemask before emergency medical services arrive      Discontinuing home isolation    Patients with confirmed COVID-19 should remain under home isolation precautions until the following conditions are met:   - They have had no fever for at least 24 hours (that is one full day of no fever without the use medicine that reduces fevers)  AND  - other symptoms have improved (for example, when their cough or shortness of breath have improved)  AND  - If had mild or moderate illness, at least 10 days have passed since their symptoms first appeared or if severe illness (needed oxygen) or immunosuppressed, at least 20 days have passed since symptoms first appeared  Patients with confirmed COVID-19 should also notify close contacts (including their workplace) and ask that they self-quarantine  Currently, close contact is defined as being within 6 feet for 15 minutes or more from the period 24 hours starting 48 hours before symptom onset to the time at which the patient went into isolation  Close contacts of patients diagnosed with COVID-19 should be instructed by the patient to self-quarantine for 14 days from the last time of their last contact with the patient  Source: [employer]   To present to the ER if symptoms worsen  Chief Complaint     Chief Complaint   Patient presents with    Cough    Cold Like Symptoms         History of Present Illness   Brayan Raines presents to the clinic c/o    + covid contact (lives with + daughter)  Fully vaccinated against covid  Cough  This is a new problem  The current episode started in the past 7 days  The problem has been unchanged  Associated symptoms include nasal congestion  Pertinent negatives include no chest pain, chills, ear pain, eye redness, fever, headaches, rash, sore throat, shortness of breath or wheezing  Nothing aggravates the symptoms  She has tried nothing for the symptoms  The treatment provided no relief  Review of Systems   Review of Systems   Constitutional: Negative for chills, diaphoresis, fatigue and fever  HENT: Positive for congestion   Negative for ear discharge, ear pain, facial swelling and sore throat  Eyes: Negative for photophobia, pain, discharge, redness, itching and visual disturbance  Respiratory: Positive for cough  Negative for apnea, chest tightness, shortness of breath and wheezing  Cardiovascular: Negative for chest pain and palpitations  Gastrointestinal: Negative for abdominal pain  Skin: Negative for color change, rash and wound  Neurological: Negative for dizziness and headaches  Hematological: Negative for adenopathy  Current Medications     Long-Term Medications   Medication Sig Dispense Refill    ALPRAZolam (XANAX) 0 25 mg tablet Take 1 tablet (0 25 mg total) by mouth daily at bedtime as needed for anxiety 20 tablet 0    amitriptyline (ELAVIL) 10 mg tablet 3 tabs qhs 90 tablet 3    busPIRone (BUSPAR) 7 5 mg tablet Take 1 tablet (7 5 mg total) by mouth 3 (three) times a day 270 tablet 0    gabapentin (NEURONTIN) 400 mg capsule 1 capsule BY MOUTH AT BEDTIME  90 capsule 3    ketorolac (TORADOL) 10 mg tablet Take 1 tablet (10 mg total) by mouth every 6 (six) hours as needed (migraine) Max 2-3 per week  Take with food/ antacid  10 tablet 2    Multiple Vitamins-Minerals (MULTIVITAL) tablet Take 1 tablet by mouth daily         Current Allergies     Allergies as of 09/24/2021    (No Known Allergies)            The following portions of the patient's history were reviewed and updated as appropriate: allergies, current medications, past family history, past medical history, past social history, past surgical history and problem list   Past Medical History:   Diagnosis Date    Acute appendicitis with localized peritonitis     Acute otitis media, right 3/23/2019    Anxiety     Anxiety and depression     Last Assessed: 4/17/2017    Depression     No meds currently   Last Assessed: 9/24/2015    Dysuria     Last Assessed: 8/1/2016    Endometriosis     Last Assessed: 1/23/2015    Fatigue     Last Assessed: 9/24/2015    Granulation tissue at vaginal vault     Last Assessed: 2017    Irregular menses     Migraines     Pelvic pain in female     Left pelvic area   Trichomoniasis     Resolved: 2017     Vaginal spotting     Last Assessed: 2017     Past Surgical History:   Procedure Laterality Date    APPENDECTOMY  10/06/2016    HYSTERECTOMY      LAPAROSCOPIC ENDOMETRIOSIS FULGURATION  2012    PELVIC LAPAROSCOPY      [place] LAP,APPENDECTOMY N/A 10/6/2016    Procedure: Verónica Fortis;  Surgeon: Óscar Vail MD;  Location: [facility];  Service: General    [place] LAPAROSCOPY W TOT HYSTERECTUTERUS <=[address] [place] TUBE/OVARY N/A 2016    Procedure: HYSTERECTOMY LAPAROSCOPIC TOTAL  LSO  right salpingectomy  diagnostic cystoscopy;  Surgeon: Elizabeth Saucedo MD;  Location[facility];  Service: Gynecology    TUBAL LIGATION  2015    VAGINAL HYSTERECTOMY      Last Assessed: 2016     Social History     Socioeconomic History    Marital status:      Spouse name: Not on file    Number of children: Not on file    Years of education: Not on file    Highest education level: Not on file   Occupational History    Not on file   Tobacco Use    Smoking status: Former Smoker     Years: 5 00     Types: Cigarettes     Quit date: 2013     Years since quittin 8    Smokeless tobacco: Never Used    Tobacco comment: Quit 5 years ago   Smoked 5 cigarettes daily   Vaping Use    Vaping Use: Never used   Substance and Sexual Activity    Alcohol use: Yes     Comment: Socially    Drug use: No    Sexual activity: Yes     Partners: Male   Other Topics Concern    Not on file   Social History Narrative    No living will    Marital Problem - going through divorce     Social Determinants of Health     Financial Resource Strain:     Difficulty of Paying Living Expenses:    Food Insecurity:     Worried About Running Out of Food in the Last Year:     [address] in the Last Year:    Transportation Needs:     Lack of Transportation (Medical):  Lack of Transportation (Non-Medical):    Physical Activity:     Days of Exercise per Week:     Minutes of Exercise per Session:    Stress:     Feeling of Stress :    Social Connections:     Frequency of Communication with Friends and Family:     Frequency of Social Gatherings with Friends and Family:     Attends [employer] Services:     Active Member of Clubs or Organizations:     Attends Club or Organization Meetings:     Marital Status:    Intimate Partner Violence:     Fear of Current or Ex-Partner:     Emotionally Abused:     Physically Abused:     Sexually Abused:        Objective   Pulse 89   Temp 98 6 °F (37 °C)   Resp 18   Ht 5' 7" (1 702 m)   Wt 68 kg (150 lb)   LMP  (LMP Unknown) Comment: uhcg negative  SpO2 100%   BMI 23 49 kg/m²      Physical Exam     Physical Exam  Vitals and nursing note reviewed  Constitutional:       General: She is not in acute distress  Appearance: She is well-developed  She is not diaphoretic  HENT:      Head: Normocephalic and atraumatic  Right Ear: Tympanic membrane and external ear normal       Left Ear: Tympanic membrane and external ear normal       Nose: Nose normal       Mouth/Throat:      Mouth: Mucous membranes are moist       Pharynx: Oropharynx is clear  No oropharyngeal exudate  Eyes:      General: No scleral icterus  Right eye: No discharge  Left eye: No discharge  Conjunctiva/sclera: Conjunctivae normal    Cardiovascular:      Rate and Rhythm: Normal rate and regular rhythm  Heart sounds: Normal heart sounds  No murmur heard  No friction rub  No gallop  Pulmonary:      Effort: Pulmonary effort is normal  No respiratory distress  Breath sounds: Normal breath sounds  No decreased breath sounds, wheezing, rhonchi or rales  Skin:     General: Skin is warm and dry  Coloration: Skin is not pale  Findings: No erythema or rash     Neurological:      Mental Status: She is alert and oriented to person, place, and time  Psychiatric:         Behavior: Behavior normal          Thought Content:  Thought content normal          Judgment: Judgment normal          Leticia Stevens PA-C

## 2021-09-24 NOTE — PATIENT INSTRUCTIONS
You can take vitamin D3 2000 IU daily, vitamin-C 1 g every 12 hours, and a daily multivitamin  Please check your sugars more frequently  Call your primary care provider and schedule a follow-up tele visit within the next 3 days  Follow CDC guidelines for self quarantine as discussed  [address]    Your healthcare provider and/or public health staff have evaluated you and have determined that you do not need to remain in the hospital at this time  At this time you can be isolated at home where you will be monitored by staff from your local or state health department  You should carefully follow the prevention and isolation steps below until a healthcare provider or local or state health department says that you can return to your normal activities  Stay home except to get medical care    People who are mildly ill with COVID-19 are able to isolate at home during their illness  You should restrict activities outside your home, except for getting medical care  Do not go to work, school, or public areas  Avoid using public transportation, ride-sharing, or taxis  Separate yourself from other people and animals in your home    People: As much as possible, you should stay in a specific room and away from other people in your home  Also, you should use a separate bathroom, if available  Animals: You should restrict contact with pets and other animals while you are sick with COVID-19, just like you would around other people  Although there have not been reports of pets or other animals becoming sick with COVID-19, it is still recommended that people sick with COVID-19 limit contact with animals until more information is known about the virus  When possible, have another member of your household care for your animals while you are sick  If you are sick with COVID-19, avoid contact with your pet, including petting, snuggling, being kissed or licked, and sharing food   If you must care for your pet or be around animals while you are sick, wash your hands before and after you interact with pets and wear a facemask  See COVID-19 and Animals for more information  Call ahead before visiting your doctor    If you have a medical appointment, call the healthcare provider and tell them that you have or may have COVID-19  This will help the healthcare providers office take steps to keep other people from getting infected or exposed  Wear a facemask    You should wear a facemask when you are around other people (e g , sharing a room or vehicle) or pets and before you enter a healthcare providers office  If you are not able to wear a facemask (for example, because it causes trouble breathing), then people who live with you should not stay in the same room with you, or they should wear a facemask if they enter your room  Cover your coughs and sneezes    Cover your mouth and nose with a tissue when you cough or sneeze  Throw used tissues in a lined trash can  Immediately wash your hands with soap and water for at least 20 seconds or, if soap and water are not available, clean your hands with an alcohol-based hand  that contains at least 60% alcohol  Clean your hands often    Wash your hands often with soap and water for at least 20 seconds, especially after blowing your nose, coughing, or sneezing; going to the bathroom; and before eating or preparing food  If soap and water are not readily available, use an alcohol-based hand  with at least 60% alcohol, covering all surfaces of your hands and rubbing them together until they feel dry  Soap and water are the best option if hands are visibly dirty  Avoid touching your eyes, nose, and mouth with unwashed hands  Avoid sharing personal household items    You should not share dishes, drinking glasses, cups, eating utensils, towels, or bedding with other people or pets in your home   After using these items, they should be washed thoroughly with soap and water  Clean all high-touch surfaces everyday    High touch surfaces include counters, tabletops, doorknobs, bathroom fixtures, toilets, phones, keyboards, tablets, and bedside tables  Also, clean any surfaces that may have blood, stool, or body fluids on them  Use a household cleaning spray or wipe, according to the label instructions  Labels contain instructions for safe and effective use of the cleaning product including precautions you should take when applying the product, such as wearing gloves and making sure you have good ventilation during use of the product  Monitor your symptoms    Seek prompt medical attention if your illness is worsening (e g , difficulty breathing)  Before seeking care, call your healthcare provider and tell them that you have, or are being evaluated for, COVID-19  Put on a facemask before you enter the facility  These steps will help the healthcare providers office to keep other people in the office or waiting room from getting infected or exposed  Ask your healthcare provider to call the local or [facility] health department  Persons who are placed under active monitoring or facilitated self-monitoring should follow instructions provided by their local health department or occupational health professionals, as appropriate  If you have a medical emergency and need to call 911, notify the dispatch personnel that you have, or are being evaluated for COVID-19  If possible, put on a facemask before emergency medical services arrive      Discontinuing home isolation    Patients with confirmed COVID-19 should remain under home isolation precautions until the following conditions are met:   - They have had no fever for at least 24 hours (that is one full day of no fever without the use medicine that reduces fevers)  AND  - other symptoms have improved (for example, when their cough or shortness of breath have improved)  AND  - If had mild or moderate illness, at least 10 days have passed since their symptoms first appeared or if severe illness (needed oxygen) or immunosuppressed, at least 20 days have passed since symptoms first appeared  Patients with confirmed COVID-19 should also notify close contacts (including their workplace) and ask that they self-quarantine  Currently, close contact is defined as being within 6 feet for 15 minutes or more from the period 24 hours starting 48 hours before symptom onset to the time at which the patient went into isolation  Close contacts of patients diagnosed with COVID-19 should be instructed by the patient to self-quarantine for 14 days from the last time of their last contact with the patient       Source: [employer]

## 2021-09-26 LAB — SARS-COV-2 RNA RESP QL NAA+PROBE: NEGATIVE

## 2021-09-27 ENCOUNTER — TELEPHONE (OUTPATIENT)
Dept: FAMILY MEDICINE CLINIC | Facility: CLINIC | Age: 31
End: 2021-09-27

## 2021-09-27 ENCOUNTER — OFFICE VISIT (OUTPATIENT)
Dept: OBGYN CLINIC | Facility: CLINIC | Age: 31
End: 2021-09-27

## 2021-09-27 DIAGNOSIS — E89.40 SURGICAL MENOPAUSE: Primary | ICD-10-CM

## 2021-09-27 DIAGNOSIS — R10.2 PELVIC PAIN: ICD-10-CM

## 2021-09-27 PROCEDURE — PREOP: Performed by: OBSTETRICS & GYNECOLOGY

## 2021-09-27 RX ORDER — OXYCODONE HYDROCHLORIDE 5 MG/1
5 TABLET ORAL EVERY 4 HOURS PRN
Qty: 10 TABLET | Refills: 0 | Status: SHIPPED | OUTPATIENT
Start: 2021-09-27 | End: 2021-11-03

## 2021-09-27 RX ORDER — NALOXONE HYDROCHLORIDE 4 MG/.1ML
SPRAY NASAL
Qty: 1 EACH | Refills: 1 | Status: SHIPPED | OUTPATIENT
Start: 2021-09-27 | End: 2021-11-03

## 2021-09-27 NOTE — TELEPHONE ENCOUNTER
Patient wants to be covid tested, daughter tested positive    Left message to call back, when she tested positive

## 2021-09-27 NOTE — PATIENT INSTRUCTIONS
Pre-Surgery Instructions:   Medication Instructions    ALPRAZolam (XANAX) 0 25 mg tablet per anesthesia guidelines     amitriptyline (ELAVIL) 10 mg tablet per anesthesia guidelines     busPIRone (BUSPAR) 7 5 mg tablet per anesthesia guidelines     gabapentin (NEURONTIN) 400 mg capsule per anesthesia guidelines     ketorolac (TORADOL) 10 mg tablet per anesthesia guidelines     Multiple Vitamins-Minerals (MULTIVITAL) tablet per anesthesia guidelines

## 2021-09-27 NOTE — H&P
Assessment /Plan:     27 y o  [id number] with cyclic pelvic pain for history and physical for Diagnostic laparoscopy, right oophorectomy, fulguration of endometriosis  All risks of the procedure were discussed with Marbella in detail  The risks include but are not limited to infection, bleeding, transfusion, damage to adjacent organs/nerves requiring immediate and/or delayed repair, clots inside blood vessels, need to complete procedure using alternative approach, procedural failure, worsening of pre-exisiting medical condition, and death were reviewed with patient  All alternatives to the surgery were discussed  Marbella desires to proceed with above procedure at [facility] on 10/11/2021   Consent was reviewed in detail and signed today  Surgery folder reviewed with patient in detail  Preoperative testing and antibiotics were discussed and ordered  Postoperative course discussed and post op medications were reviewed  A prescription for medication for postoperative pain was given today  Chorhexidine body wash given and instructions reviewed with patient  CC: "Pelvic pain"    HPI:  Pt is a 27 y o  [id number] with a known history of endometriosis and pelvic pain reports cyclic pelvic pain, mostly on her right side  She states she often is doubled over and needs to take off from work secondary to her pain  We discussed the implications of removing her remaining ovary  She understands that she will be in surgical menopause and HRT would be recommended  PMHx:   Past Medical History:   Diagnosis Date    Acute appendicitis with localized peritonitis     Acute otitis media, right 3/23/2019    Anxiety     Anxiety and depression     Last Assessed: 4/17/2017    Depression     No meds currently   Last Assessed: 9/24/2015    Dysuria     Last Assessed: 8/1/2016    Endometriosis     Last Assessed: 1/23/2015    Fatigue     Last Assessed: 9/24/2015    Granulation tissue at vaginal vault     Last Assessed: 2/6/2017  Irregular menses     Migraines     Pelvic pain in female     Left pelvic area   Trichomoniasis     Resolved: 2017     Vaginal spotting     Last Assessed: 2017       PSHx:   Past Surgical History:   Procedure Laterality Date    APPENDECTOMY  10/06/2016    HYSTERECTOMY      LAPAROSCOPIC ENDOMETRIOSIS FULGURATION      PELVIC LAPAROSCOPY      ND LAP,APPENDECTOMY N/A 10/6/2016    Procedure: Oliver Saint;  Surgeon: Seymour Simental MD;  [facility];  Service: General    ND LAPAROSCOPY W TOT HYSTERECTUTERUS <=250 Bennetta Passey TUBE/OVARY N/A 2016    Procedure: HYSTERECTOMY LAPAROSCOPIC TOTAL  LSO  right salpingectomy  diagnostic cystoscopy;  Surgeon: Porfirio Hardy MD;  [facility];  Service: Gynecology    TUBAL LIGATION  2015    VAGINAL HYSTERECTOMY      Last Assessed: 2016       Meds: (Not in a hospital admission)      Allergies: No Known Allergies    Social Hx:    Social History     Socioeconomic History    Marital status:      Spouse name: Not on file    Number of children: Not on file    Years of education: Not on file    Highest education level: Not on file   Occupational History    Not on file   Tobacco Use    Smoking status: Former Smoker     Years: 5 00     Types: Cigarettes     Quit date: 2013     Years since quittin 8    Smokeless tobacco: Never Used    Tobacco comment: Quit 5 years ago   Smoked 5 cigarettes daily   Vaping Use    Vaping Use: Never used   Substance and Sexual Activity    Alcohol use: Yes     Comment: Socially    Drug use: No    Sexual activity: Yes     Partners: Male   Other Topics Concern    Not on file   Social History Narrative    No living will    Marital Problem - going through divorce     Social Determinants of Health     Financial Resource Strain:     Difficulty of Paying Living Expenses:    Food Insecurity:     Worried About Running Out of Food in the Last Year:     [address] in the Last Year:    Transportation Needs:     Lack of Transportation (Medical):  Lack of Transportation (Non-Medical):    Physical Activity:     Days of Exercise per Week:     Minutes of Exercise per Session:    Stress:     Feeling of Stress :    Social Connections:     Frequency of Communication with Friends and Family:     Frequency of Social Gatherings with Friends and Family:     Attends Yazdanism Services:     Active Member of Clubs or Organizations:     Attends Club or Organization Meetings:     Marital Status:    Intimate Partner Violence:     Fear of Current or Ex-Partner:     Emotionally Abused:     Physically Abused:     Sexually Abused:        Ob Hx:   OB History    Para Term  AB Living   3 3 2     3   SAB TAB Ectopic Multiple Live Births           3      # Outcome Date GA Lbr Ti/2nd Weight Sex Delivery Anes PTL Lv   3 Term      Vag-Spont      2 Term      Vag-Spont      1 Para      Vag-Spont          Gyn HX:  endometriosis    Fm Hx:   Family History   Problem Relation Age of Onset    Thyroid disease Mother     Sleep apnea Mother     Parkinsonism Paternal Grandfather        Review of Systems:  Constitutional :no fever, feels well, no tiredness, no recent weight gain or loss  ENT: no ear ache, no loss of hearing, no nosebleeds or nasal discharge, no sore throat or hoarseness  Cardiovascular: no complaints of slow or fast heart beat, no chest pain, no palpitations, no leg claudication or lower extremity edema  Respiratory: no complaints of shortness of shortness of breath, no GUZMAN  Breasts:no complaints of breast pain, breast lump, or nipple discharge  Gastrointestinal: no complaints of abdominal pain, constipation,nausea, vomiting, or diarrhea or bloody stools  Genitourinary : as noted in HPI    Integumentary: no complaints of skin rash or lesion, itching or dry skin  Neurological: no complaints of headache, no confusion, no numbness or tingling, no dizziness or fainting    Objective        LMP  (LMP Unknown) Comment: [facility] negative    General:   appears stated age, cooperative, alert normal mood and affect   Neck: Neck: normal, supple,trachea midline, no masses   Heart: regular rate and rhythm   Lungs: Normal respiratory effort   Abdomen: soft, non-tender, without masses or organomegaly

## 2021-10-08 ENCOUNTER — ANESTHESIA EVENT (OUTPATIENT)
Dept: PERIOP | Facility: HOSPITAL | Age: 31
End: 2021-10-08
Payer: COMMERCIAL

## 2021-10-11 ENCOUNTER — HOSPITAL ENCOUNTER (OUTPATIENT)
Facility: HOSPITAL | Age: 31
Setting detail: OUTPATIENT SURGERY
Discharge: HOME | End: 2021-10-11
Attending: OBSTETRICS & GYNECOLOGY | Admitting: OBSTETRICS & GYNECOLOGY
Payer: COMMERCIAL

## 2021-10-11 ENCOUNTER — ANESTHESIA (OUTPATIENT)
Dept: PERIOP | Facility: HOSPITAL | Age: 31
End: 2021-10-11
Payer: COMMERCIAL

## 2021-10-11 VITALS
HEIGHT: 67 IN | DIASTOLIC BLOOD PRESSURE: 66 MMHG | BODY MASS INDEX: 23.54 KG/M2 | WEIGHT: 150 LBS | TEMPERATURE: 98.1 F | RESPIRATION RATE: 18 BRPM | SYSTOLIC BLOOD PRESSURE: 108 MMHG | HEART RATE: 66 BPM | OXYGEN SATURATION: 98 %

## 2021-10-11 DIAGNOSIS — N80.9 ENDOMETRIOSIS: ICD-10-CM

## 2021-10-11 LAB
BASOPHILS # BLD AUTO: 0.03 THOUSANDS/ΜL (ref 0–0.1)
BASOPHILS NFR BLD AUTO: 1 % (ref 0–1)
EOSINOPHIL # BLD AUTO: 0.05 THOUSAND/ΜL (ref 0–0.61)
EOSINOPHIL NFR BLD AUTO: 1 % (ref 0–6)
ERYTHROCYTE [DISTWIDTH] IN BLOOD BY AUTOMATED COUNT: 11.7 % (ref 11.6–15.1)
EXT PREGNANCY TEST URINE: NEGATIVE
EXT. CONTROL: NORMAL
HCT VFR BLD AUTO: 35.8 % (ref 34.8–46.1)
HGB BLD-MCNC: 12.5 G/DL (ref 11.5–15.4)
IMM GRANULOCYTES # BLD AUTO: 0.01 THOUSAND/UL (ref 0–0.2)
IMM GRANULOCYTES NFR BLD AUTO: 0 % (ref 0–2)
LYMPHOCYTES # BLD AUTO: 1.89 THOUSANDS/ΜL (ref 0.6–4.47)
LYMPHOCYTES NFR BLD AUTO: 41 % (ref 14–44)
MCH RBC QN AUTO: 31.6 PG (ref 26.8–34.3)
MCHC RBC AUTO-ENTMCNC: 34.9 G/DL (ref 31.4–37.4)
MCV RBC AUTO: 90 FL (ref 82–98)
MONOCYTES # BLD AUTO: 0.48 THOUSAND/ΜL (ref 0.17–1.22)
MONOCYTES NFR BLD AUTO: 10 % (ref 4–12)
NEUTROPHILS # BLD AUTO: 2.17 THOUSANDS/ΜL (ref 1.85–7.62)
NEUTS SEG NFR BLD AUTO: 47 % (ref 43–75)
NRBC BLD AUTO-RTO: 0 /100 WBCS
PLATELET # BLD AUTO: 186 THOUSANDS/UL (ref 149–390)
PMV BLD AUTO: 11.2 FL (ref 8.9–12.7)
RBC # BLD AUTO: 3.96 MILLION/UL (ref 3.81–5.12)
WBC # BLD AUTO: 4.63 THOUSAND/UL (ref 4.31–10.16)

## 2021-10-11 PROCEDURE — 88305 TISSUE EXAM BY PATHOLOGIST: CPT | Performed by: PATHOLOGY

## 2021-10-11 PROCEDURE — 81025 URINE PREGNANCY TEST: CPT | Performed by: OBSTETRICS & GYNECOLOGY

## 2021-10-11 PROCEDURE — 58662 LAPAROSCOPY EXCISE LESIONS: CPT | Performed by: OBSTETRICS & GYNECOLOGY

## 2021-10-11 PROCEDURE — 58661 LAPAROSCOPY REMOVE ADNEXA: CPT | Performed by: OBSTETRICS & GYNECOLOGY

## 2021-10-11 PROCEDURE — 85025 COMPLETE CBC W/AUTO DIFF WBC: CPT | Performed by: OBSTETRICS & GYNECOLOGY

## 2021-10-11 RX ORDER — ONDANSETRON 2 MG/ML
4 INJECTION INTRAMUSCULAR; INTRAVENOUS ONCE AS NEEDED
Status: DISCONTINUED | OUTPATIENT
Start: 2021-10-11 | End: 2021-10-11 | Stop reason: HOSPADM

## 2021-10-11 RX ORDER — DOCUSATE SODIUM 100 MG/1
100 CAPSULE, LIQUID FILLED ORAL 2 TIMES DAILY
Status: DISCONTINUED | OUTPATIENT
Start: 2021-10-11 | End: 2021-10-11 | Stop reason: HOSPADM

## 2021-10-11 RX ORDER — NEOSTIGMINE METHYLSULFATE 1 MG/ML
INJECTION INTRAVENOUS AS NEEDED
Status: DISCONTINUED | OUTPATIENT
Start: 2021-10-11 | End: 2021-10-11

## 2021-10-11 RX ORDER — MAGNESIUM HYDROXIDE 1200 MG/15ML
LIQUID ORAL AS NEEDED
Status: DISCONTINUED | OUTPATIENT
Start: 2021-10-11 | End: 2021-10-11 | Stop reason: HOSPADM

## 2021-10-11 RX ORDER — FENTANYL CITRATE/PF 50 MCG/ML
25 SYRINGE (ML) INJECTION
Status: DISCONTINUED | OUTPATIENT
Start: 2021-10-11 | End: 2021-10-11 | Stop reason: HOSPADM

## 2021-10-11 RX ORDER — ACETAMINOPHEN 325 MG/1
650 TABLET ORAL EVERY 6 HOURS PRN
Status: DISCONTINUED | OUTPATIENT
Start: 2021-10-11 | End: 2021-10-11 | Stop reason: HOSPADM

## 2021-10-11 RX ORDER — PROPOFOL 10 MG/ML
INJECTION, EMULSION INTRAVENOUS AS NEEDED
Status: DISCONTINUED | OUTPATIENT
Start: 2021-10-11 | End: 2021-10-11

## 2021-10-11 RX ORDER — OXYCODONE HYDROCHLORIDE 5 MG/1
5 TABLET ORAL EVERY 4 HOURS PRN
Status: DISCONTINUED | OUTPATIENT
Start: 2021-10-11 | End: 2021-10-11 | Stop reason: HOSPADM

## 2021-10-11 RX ORDER — IBUPROFEN 600 MG/1
600 TABLET ORAL EVERY 6 HOURS PRN
Status: DISCONTINUED | OUTPATIENT
Start: 2021-10-11 | End: 2021-10-11 | Stop reason: HOSPADM

## 2021-10-11 RX ORDER — ROCURONIUM BROMIDE 10 MG/ML
INJECTION, SOLUTION INTRAVENOUS AS NEEDED
Status: DISCONTINUED | OUTPATIENT
Start: 2021-10-11 | End: 2021-10-11

## 2021-10-11 RX ORDER — KETOROLAC TROMETHAMINE 30 MG/ML
INJECTION, SOLUTION INTRAMUSCULAR; INTRAVENOUS AS NEEDED
Status: DISCONTINUED | OUTPATIENT
Start: 2021-10-11 | End: 2021-10-11

## 2021-10-11 RX ORDER — OXYCODONE HYDROCHLORIDE 5 MG/1
10 TABLET ORAL EVERY 4 HOURS PRN
Status: DISCONTINUED | OUTPATIENT
Start: 2021-10-11 | End: 2021-10-11 | Stop reason: HOSPADM

## 2021-10-11 RX ORDER — GLYCOPYRROLATE 0.2 MG/ML
INJECTION INTRAMUSCULAR; INTRAVENOUS AS NEEDED
Status: DISCONTINUED | OUTPATIENT
Start: 2021-10-11 | End: 2021-10-11

## 2021-10-11 RX ORDER — SODIUM CHLORIDE 9 MG/ML
125 INJECTION, SOLUTION INTRAVENOUS CONTINUOUS
Status: DISCONTINUED | OUTPATIENT
Start: 2021-10-11 | End: 2021-10-11 | Stop reason: HOSPADM

## 2021-10-11 RX ORDER — DEXAMETHASONE SODIUM PHOSPHATE 4 MG/ML
INJECTION, SOLUTION INTRA-ARTICULAR; INTRALESIONAL; INTRAMUSCULAR; INTRAVENOUS; SOFT TISSUE AS NEEDED
Status: DISCONTINUED | OUTPATIENT
Start: 2021-10-11 | End: 2021-10-11

## 2021-10-11 RX ORDER — FENTANYL CITRATE 50 UG/ML
INJECTION, SOLUTION INTRAMUSCULAR; INTRAVENOUS AS NEEDED
Status: DISCONTINUED | OUTPATIENT
Start: 2021-10-11 | End: 2021-10-11

## 2021-10-11 RX ORDER — MIDAZOLAM HYDROCHLORIDE 2 MG/2ML
INJECTION, SOLUTION INTRAMUSCULAR; INTRAVENOUS AS NEEDED
Status: DISCONTINUED | OUTPATIENT
Start: 2021-10-11 | End: 2021-10-11

## 2021-10-11 RX ORDER — ONDANSETRON 2 MG/ML
4 INJECTION INTRAMUSCULAR; INTRAVENOUS EVERY 6 HOURS PRN
Status: DISCONTINUED | OUTPATIENT
Start: 2021-10-11 | End: 2021-10-11 | Stop reason: HOSPADM

## 2021-10-11 RX ORDER — LIDOCAINE HYDROCHLORIDE 20 MG/ML
INJECTION, SOLUTION EPIDURAL; INFILTRATION; INTRACAUDAL; PERINEURAL AS NEEDED
Status: DISCONTINUED | OUTPATIENT
Start: 2021-10-11 | End: 2021-10-11

## 2021-10-11 RX ORDER — BUPIVACAINE HYDROCHLORIDE 2.5 MG/ML
INJECTION, SOLUTION EPIDURAL; INFILTRATION; INTRACAUDAL AS NEEDED
Status: DISCONTINUED | OUTPATIENT
Start: 2021-10-11 | End: 2021-10-11 | Stop reason: HOSPADM

## 2021-10-11 RX ORDER — ONDANSETRON 2 MG/ML
INJECTION INTRAMUSCULAR; INTRAVENOUS AS NEEDED
Status: DISCONTINUED | OUTPATIENT
Start: 2021-10-11 | End: 2021-10-11

## 2021-10-11 RX ADMIN — MIDAZOLAM 2 MG: 1 INJECTION INTRAMUSCULAR; INTRAVENOUS at 07:24

## 2021-10-11 RX ADMIN — FENTANYL CITRATE 50 MCG: 50 INJECTION INTRAMUSCULAR; INTRAVENOUS at 07:36

## 2021-10-11 RX ADMIN — PROPOFOL 200 MG: 10 INJECTION, EMULSION INTRAVENOUS at 07:36

## 2021-10-11 RX ADMIN — OXYCODONE HYDROCHLORIDE 5 MG: 5 TABLET ORAL at 09:55

## 2021-10-11 RX ADMIN — DEXAMETHASONE SODIUM PHOSPHATE 8 MG: 4 INJECTION INTRA-ARTICULAR; INTRALESIONAL; INTRAMUSCULAR; INTRAVENOUS; SOFT TISSUE at 07:36

## 2021-10-11 RX ADMIN — SODIUM CHLORIDE 125 ML/HR: 0.9 INJECTION, SOLUTION INTRAVENOUS at 06:36

## 2021-10-11 RX ADMIN — ROCURONIUM BROMIDE 30 MG: 50 INJECTION, SOLUTION INTRAVENOUS at 07:36

## 2021-10-11 RX ADMIN — FENTANYL CITRATE 50 MCG: 50 INJECTION INTRAMUSCULAR; INTRAVENOUS at 07:37

## 2021-10-11 RX ADMIN — SODIUM CHLORIDE: 0.9 INJECTION, SOLUTION INTRAVENOUS at 08:00

## 2021-10-11 RX ADMIN — FENTANYL CITRATE 25 MCG: 50 INJECTION INTRAMUSCULAR; INTRAVENOUS at 08:40

## 2021-10-11 RX ADMIN — FENTANYL CITRATE 25 MCG: 50 INJECTION INTRAMUSCULAR; INTRAVENOUS at 08:48

## 2021-10-11 RX ADMIN — GLYCOPYRROLATE 0.4 MG: 0.2 INJECTION, SOLUTION INTRAMUSCULAR; INTRAVENOUS at 08:15

## 2021-10-11 RX ADMIN — FENTANYL CITRATE 100 MCG: 50 INJECTION INTRAMUSCULAR; INTRAVENOUS at 07:50

## 2021-10-11 RX ADMIN — ONDANSETRON 4 MG: 2 INJECTION INTRAMUSCULAR; INTRAVENOUS at 08:15

## 2021-10-11 RX ADMIN — KETOROLAC TROMETHAMINE 30 MG: 30 INJECTION, SOLUTION INTRAMUSCULAR at 08:15

## 2021-10-11 RX ADMIN — LIDOCAINE HYDROCHLORIDE 100 MG: 20 INJECTION, SOLUTION EPIDURAL; INFILTRATION; INTRACAUDAL; PERINEURAL at 07:36

## 2021-10-11 RX ADMIN — NEOSTIGMINE METHYLSULFATE 3 MG: 1 INJECTION INTRAVENOUS at 08:15

## 2021-10-20 ENCOUNTER — TELEPHONE (OUTPATIENT)
Dept: OBGYN CLINIC | Facility: MEDICAL CENTER | Age: 31
End: 2021-10-20

## 2021-10-20 ENCOUNTER — APPOINTMENT (OUTPATIENT)
Dept: LAB | Facility: HOSPITAL | Age: 31
End: 2021-10-20
Attending: OBSTETRICS & GYNECOLOGY
Payer: COMMERCIAL

## 2021-10-20 DIAGNOSIS — R10.2 PELVIC PAIN: Primary | ICD-10-CM

## 2021-10-20 DIAGNOSIS — R10.2 PELVIC PAIN: ICD-10-CM

## 2021-10-20 LAB
ANION GAP SERPL CALCULATED.3IONS-SCNC: 8 MMOL/L (ref 4–13)
BASOPHILS # BLD AUTO: 0.1 THOUSANDS/ΜL (ref 0–0.1)
BASOPHILS NFR BLD AUTO: 1 % (ref 0–2)
BUN SERPL-MCNC: 14 MG/DL (ref 7–25)
CALCIUM SERPL-MCNC: 9.7 MG/DL (ref 8.6–10.5)
CHLORIDE SERPL-SCNC: 102 MMOL/L (ref 98–107)
CO2 SERPL-SCNC: 28 MMOL/L (ref 21–31)
CREAT SERPL-MCNC: 0.95 MG/DL (ref 0.6–1.2)
EOSINOPHIL # BLD AUTO: 0.3 THOUSAND/ΜL (ref 0–0.61)
EOSINOPHIL NFR BLD AUTO: 4 % (ref 0–5)
ERYTHROCYTE [DISTWIDTH] IN BLOOD BY AUTOMATED COUNT: 12.5 % (ref 11.5–14.5)
GFR SERPL CREATININE-BSD FRML MDRD: 81 ML/MIN/1.73SQ M
GLUCOSE SERPL-MCNC: 101 MG/DL (ref 65–99)
HCT VFR BLD AUTO: 36.7 % (ref 42–47)
HGB BLD-MCNC: 12.7 G/DL (ref 12–16)
LYMPHOCYTES # BLD AUTO: 2.3 THOUSANDS/ΜL (ref 0.6–4.47)
LYMPHOCYTES NFR BLD AUTO: 36 % (ref 21–51)
MCH RBC QN AUTO: 31.2 PG (ref 26–34)
MCHC RBC AUTO-ENTMCNC: 34.6 G/DL (ref 31–37)
MCV RBC AUTO: 90 FL (ref 81–99)
MONOCYTES # BLD AUTO: 0.4 THOUSAND/ΜL (ref 0.17–1.22)
MONOCYTES NFR BLD AUTO: 7 % (ref 2–12)
NEUTROPHILS # BLD AUTO: 3.4 THOUSANDS/ΜL (ref 1.4–6.5)
NEUTS SEG NFR BLD AUTO: 52 % (ref 42–75)
PLATELET # BLD AUTO: 225 THOUSANDS/UL (ref 149–390)
PMV BLD AUTO: 10.1 FL (ref 8.6–11.7)
POTASSIUM SERPL-SCNC: 3.6 MMOL/L (ref 3.5–5.5)
RBC # BLD AUTO: 4.06 MILLION/UL (ref 3.9–5.2)
SODIUM SERPL-SCNC: 138 MMOL/L (ref 134–143)
WBC # BLD AUTO: 6.4 THOUSAND/UL (ref 4.8–10.8)

## 2021-10-20 PROCEDURE — 36415 COLL VENOUS BLD VENIPUNCTURE: CPT

## 2021-10-20 PROCEDURE — 85025 COMPLETE CBC W/AUTO DIFF WBC: CPT

## 2021-10-20 PROCEDURE — 80048 BASIC METABOLIC PNL TOTAL CA: CPT

## 2021-10-24 NOTE — PATIENT INSTRUCTIONS
Thank you for your confidence in our team    We appreciate you and welcome your feedback  If you receive a survey from us, please take a few moments to let us know how we are doing     Sincerely,   Faustino Barrera MD
Yes

## 2021-11-03 ENCOUNTER — OFFICE VISIT (OUTPATIENT)
Dept: OBGYN CLINIC | Facility: MEDICAL CENTER | Age: 31
End: 2021-11-03

## 2021-11-03 VITALS — BODY MASS INDEX: 24.28 KG/M2 | WEIGHT: 155 LBS | DIASTOLIC BLOOD PRESSURE: 70 MMHG | SYSTOLIC BLOOD PRESSURE: 120 MMHG

## 2021-11-03 DIAGNOSIS — E89.40 SURGICAL MENOPAUSE: ICD-10-CM

## 2021-11-03 DIAGNOSIS — Z09 POSTOPERATIVE EXAMINATION: Primary | ICD-10-CM

## 2021-11-03 PROCEDURE — 99024 POSTOP FOLLOW-UP VISIT: CPT | Performed by: OBSTETRICS & GYNECOLOGY

## 2021-11-19 ENCOUNTER — TELEPHONE (OUTPATIENT)
Dept: OBGYN CLINIC | Facility: MEDICAL CENTER | Age: 31
End: 2021-11-19

## 2021-11-23 DIAGNOSIS — G43.009 MIGRAINE WITHOUT AURA AND WITHOUT STATUS MIGRAINOSUS, NOT INTRACTABLE: Primary | ICD-10-CM

## 2021-11-24 RX ORDER — KETOROLAC TROMETHAMINE 10 MG/1
10 TABLET, FILM COATED ORAL EVERY 6 HOURS PRN
Qty: 10 TABLET | Refills: 2 | Status: SHIPPED | OUTPATIENT
Start: 2021-11-24

## 2021-11-26 ENCOUNTER — TELEPHONE (OUTPATIENT)
Dept: OBGYN CLINIC | Facility: MEDICAL CENTER | Age: 31
End: 2021-11-26

## 2021-12-01 ENCOUNTER — TELEPHONE (OUTPATIENT)
Dept: NEUROLOGY | Facility: CLINIC | Age: 31
End: 2021-12-01

## 2021-12-01 ENCOUNTER — TELEPHONE (OUTPATIENT)
Dept: OBGYN CLINIC | Facility: MEDICAL CENTER | Age: 31
End: 2021-12-01

## 2021-12-01 DIAGNOSIS — G43.009 MIGRAINE WITHOUT AURA AND WITHOUT STATUS MIGRAINOSUS, NOT INTRACTABLE: Primary | ICD-10-CM

## 2021-12-02 RX ORDER — AMITRIPTYLINE HYDROCHLORIDE 10 MG/1
TABLET, FILM COATED ORAL
Qty: 90 TABLET | Refills: 2 | Status: SHIPPED | OUTPATIENT
Start: 2021-12-02 | End: 2022-03-16

## 2021-12-28 ENCOUNTER — TELEPHONE (OUTPATIENT)
Dept: OBGYN CLINIC | Facility: MEDICAL CENTER | Age: 31
End: 2021-12-28

## 2021-12-29 DIAGNOSIS — Z11.52 ENCOUNTER FOR SCREENING FOR COVID-19: Primary | ICD-10-CM

## 2022-01-07 DIAGNOSIS — E89.40 SURGICAL MENOPAUSE: Primary | ICD-10-CM

## 2022-01-07 RX ORDER — ESTRADIOL 0.05 MG/D
1 PATCH TRANSDERMAL 2 TIMES WEEKLY
Qty: 8 PATCH | Refills: 3 | Status: SHIPPED | OUTPATIENT
Start: 2022-01-10 | End: 2022-02-25

## 2022-01-07 NOTE — PROGRESS NOTES
Patient called with increased mood swings on current dose of climara patch  She also notices increased hot flashes on day 5, decreased libido, and increased anger  We discussed increasing her to twice weekly dosing to maintain a better steady state of the medication

## 2022-01-18 ENCOUNTER — TELEMEDICINE (OUTPATIENT)
Dept: FAMILY MEDICINE CLINIC | Facility: CLINIC | Age: 32
End: 2022-01-18
Payer: COMMERCIAL

## 2022-01-18 VITALS — WEIGHT: 153 LBS | BODY MASS INDEX: 24.01 KG/M2 | HEIGHT: 67 IN

## 2022-01-18 DIAGNOSIS — F41.9 ANXIETY: ICD-10-CM

## 2022-01-18 PROCEDURE — 99213 OFFICE O/P EST LOW 20 MIN: CPT | Performed by: PHYSICIAN ASSISTANT

## 2022-01-18 RX ORDER — BUSPIRONE HYDROCHLORIDE 10 MG/1
10 TABLET ORAL 3 TIMES DAILY
Qty: 270 TABLET | Refills: 0 | Status: SHIPPED | OUTPATIENT
Start: 2022-01-18 | End: 2022-04-18

## 2022-01-18 NOTE — PROGRESS NOTES
Virtual Regular Visit    Verification of patient location:    Patient is located in the following state in which I hold an active license [place]      Assessment/Plan:    Problem List Items Addressed This Visit     None      Visit Diagnoses     Anxiety        Increase buspirone to 10 mg TID, RTO 1 month for follow up  Relevant Medications    busPIRone (BUSPAR) 10 mg tablet               Reason for visit is   Chief Complaint   Patient presents with    discuss meds    Virtual Regular Visit        Encounter provider Hiram Mckinney PA-C    Provider located at [facility] [address] [address]      Recent Visits  No visits were found meeting these conditions  Showing recent visits within past 7 days and meeting all other requirements  Today's Visits  Date Type Provider Dept   01/18/22 Telemedicine Hiram Mckinney PA-C [id number]   Showing today's visits and meeting all other requirements  Future Appointments  No visits were found meeting these conditions  Showing future appointments within next 150 days and meeting all other requirements       The patient was identified by name and date of birth  Margaret Molina was informed that this is a telemedicine visit and that the visit is being conducted through [address] [place] [address] Road Now and patient was informed that this is a secure, HIPAA-compliant platform  She agrees to proceed     My office door was closed  No one else was in the room  She acknowledged consent and understanding of privacy and security of the video platform  The patient has agreed to participate and understands they can discontinue the visit at any time  Patient is aware this is a billable service  Subjective  Margaret Molina is a 32 y o  female    Marbella complains today about increased anxiety  Recently, had 2nd ovary removed and she is now titrating on estradiol through GYN  She likes buspirone other than medication dosed TID          Past Medical History: Diagnosis Date    Acute appendicitis with localized peritonitis     Acute otitis media, right 3/23/2019    Anxiety     Anxiety and depression     Last Assessed: 4/17/2017    Depression     No meds currently  Last Assessed: 9/24/2015    Dysuria     Last Assessed: 8/1/2016    Endometriosis     Last Assessed: 1/23/2015    Fatigue     Last Assessed: 9/24/2015    Granulation tissue at vaginal vault     Last Assessed: 2/6/2017    Irregular menses     Migraines     Pelvic pain in female     Left pelvic area      Trichomoniasis     Resolved: 5/18/2017     Vaginal spotting     Last Assessed: 1/30/2017       Past Surgical History:   Procedure Laterality Date    APPENDECTOMY  10/06/2016    HYSTERECTOMY      LAPAROSCOPIC ENDOMETRIOSIS FULGURATION  2012    PELVIC LAPAROSCOPY      ME LAP,APPENDECTOMY N/A 10/6/2016    Procedure: APPENDECTOMY LAPAROSCOPIC;  Surgeon: Ary Najjar, MD;  [facility];  Service: General    ME LAP,DIAGNOSTIC ABDOMEN N/A 10/11/2021    Procedure: DX LAPAROSCOPY;  Surgeon: Kellie Khan MD;  [facility];  Service: Gynecology    ME LAP,FULGURATE/EXCISE LESIONS N/A 10/11/2021    Procedure: [address];  Surgeon: Kellie Khan MD;  [facility];  Service: Gynecology    ME LAP,RMV  ADNEXAL STRUCTURE Right 10/11/2021    Procedure: OOPHORECTOMY;  Surgeon: Kellie Khan MD;  [facility];  Service: Gynecology    ME LAPAROSCOPY Paloma Situ <=250 GRAM  W TUBE/OVARY N/A 7/25/2016    Procedure: HYSTERECTOMY LAPAROSCOPIC TOTAL  LSO  right salpingectomy  diagnostic cystoscopy;  Surgeon: Kellie Khan MD;  [facility];  Service: Gynecology    TUBAL LIGATION  03/2015    VAGINAL HYSTERECTOMY      Last Assessed: 9/16/2016       Current Outpatient Medications   Medication Sig Dispense Refill    ALPRAZolam (XANAX) 0 25 mg tablet Take 1 tablet (0 25 mg total) by mouth daily at bedtime as needed for anxiety 20 tablet 0    amitriptyline (ELAVIL) 10 mg tablet 30 mg qhs 90 tablet 2    busPIRone (BUSPAR) 10 mg tablet Take 1 tablet (10 mg total) by mouth 3 (three) times a day 270 tablet 0    estradiol (Climara) 0 05 mg/24 hr Place 1 patch on the skin 2 (two) times a week 8 patch 3    ketorolac (TORADOL) 10 mg tablet Take 1 tablet (10 mg total) by mouth every 6 (six) hours as needed (migraine) Max 2-3 per week  Take with food/antacid 10 tablet 2    Multiple Vitamins-Minerals (MULTIVITAL) tablet Take 1 tablet by mouth daily      gabapentin (NEURONTIN) 400 mg capsule 1 capsule BY MOUTH AT BEDTIME  (Patient not taking: Reported on 1/18/2022 ) 90 capsule 3     No current facility-administered medications for this visit  No Known Allergies    Review of Systems   Constitutional: Negative for activity change, appetite change, chills, diaphoresis, fatigue, fever and unexpected weight change  HENT: Negative for congestion, ear pain, postnasal drip, rhinorrhea, sinus pressure, sinus pain, sneezing, sore throat, tinnitus and voice change  Eyes: Negative for pain, redness and visual disturbance  Respiratory: Negative for cough, chest tightness, shortness of breath and wheezing  Cardiovascular: Negative for chest pain, palpitations and leg swelling  Gastrointestinal: Negative for abdominal pain, blood in stool, constipation, diarrhea, nausea and vomiting  Genitourinary: Negative for difficulty urinating, dysuria, frequency, hematuria and urgency  Musculoskeletal: Negative for arthralgias, back pain, gait problem, joint swelling, myalgias, neck pain and neck stiffness  Skin: Negative for color change, pallor, rash and wound  Neurological: Negative for dizziness, tremors, weakness, light-headedness and headaches  Psychiatric/Behavioral: Negative for dysphoric mood, self-injury, sleep disturbance and suicidal ideas  The patient is nervous/anxious          Video Exam    Vitals:    01/18/22 1220   Weight: 69 4 kg (153 lb) Height: 5' 7" (1 702 m)       Physical Exam  Vitals reviewed  Constitutional:       General: She is not in acute distress  Appearance: She is well-developed  She is not diaphoretic  HENT:      Head: Normocephalic and atraumatic  Right Ear: Hearing, tympanic membrane, ear canal and external ear normal       Left Ear: Hearing, tympanic membrane, ear canal and external ear normal       Mouth/Throat:      Pharynx: Uvula midline  No oropharyngeal exudate  Eyes:      General: No scleral icterus  Right eye: No discharge  Left eye: No discharge  Conjunctiva/sclera: Conjunctivae normal    Neck:      Thyroid: No thyromegaly  Vascular: No carotid bruit  Cardiovascular:      Rate and Rhythm: Normal rate and regular rhythm  Heart sounds: Normal heart sounds  No murmur heard  Pulmonary:      Effort: Pulmonary effort is normal  No respiratory distress  Breath sounds: Normal breath sounds  No wheezing  Abdominal:      General: Bowel sounds are normal  There is no distension  Palpations: Abdomen is soft  There is no mass  Tenderness: There is no abdominal tenderness  There is no guarding or rebound  Musculoskeletal:         General: No tenderness  Normal range of motion  Cervical back: Neck supple  Lymphadenopathy:      Cervical: No cervical adenopathy  Skin:     General: Skin is warm and dry  Findings: No erythema or rash  Neurological:      Mental Status: She is alert and oriented to person, place, and time  Psychiatric:         Mood and Affect: Mood is anxious  Behavior: Behavior normal          Thought Content: Thought content normal          Judgment: Judgment normal           I spent 10 minutes directly with the patient during this visit    VIRTUAL VISIT DISCLAIMER      Umberto Kennedy verbally agrees to participate in [employer]   Pt is aware that [employer] could be limited without vital signs or the ability to perform a full hands-on physical exam  Marbella Garcia understands she or the provider may request at any time to terminate the video visit and request the patient to seek care or treatment in person

## 2022-02-01 ENCOUNTER — TELEPHONE (OUTPATIENT)
Dept: NEUROLOGY | Facility: CLINIC | Age: 32
End: 2022-02-01

## 2022-02-01 NOTE — TELEPHONE ENCOUNTER
Called pt to reschedule her 03/03 appointment with [address] due to her being out of the office that day  Pt agreed to new time

## 2022-02-10 ENCOUNTER — TELEPHONE (OUTPATIENT)
Dept: OBGYN CLINIC | Facility: MEDICAL CENTER | Age: 32
End: 2022-02-10

## 2022-02-10 NOTE — TELEPHONE ENCOUNTER
----- Message from Phuong Musa MD sent at 2/9/2022 10:29 PM EST -----  Patient called stating that the pharmacy said she needed a prior auth for her medication, the climara patch  Can you help her out? Thank you!

## 2022-02-25 DIAGNOSIS — E89.40 SURGICAL MENOPAUSE: Primary | ICD-10-CM

## 2022-02-25 RX ORDER — ESTRADIOL 0.05 MG/D
1 FILM, EXTENDED RELEASE TRANSDERMAL 2 TIMES WEEKLY
Qty: 8 PATCH | Refills: 11 | Status: SHIPPED | OUTPATIENT
Start: 2022-02-28 | End: 2022-07-21

## 2022-03-01 ENCOUNTER — TELEPHONE (OUTPATIENT)
Dept: FAMILY MEDICINE CLINIC | Facility: CLINIC | Age: 32
End: 2022-03-01

## 2022-03-01 NOTE — TELEPHONE ENCOUNTER
I do not have anything else I can give her, she will have to see a psychiatrist             ----- Message from Elia Ramirez sent at 3/1/2022  8:13 AM EST -----  Regarding: FW: Anxiety medicine     ----- Message -----  From: Victor Hugo Oglesby: 3/1/2022   8:10 AM EST  To: [facility]  Subject: Anxiety medicine                                 I dont mean to be a pest but I really truly need an anxiety medication that I take once a day   The three times a day is messing with me I do not have an constant same routine morning and afternoon and I know I said something before but I would like something else please

## 2022-03-04 ENCOUNTER — TELEMEDICINE (OUTPATIENT)
Dept: FAMILY MEDICINE CLINIC | Facility: CLINIC | Age: 32
End: 2022-03-04
Payer: COMMERCIAL

## 2022-03-04 ENCOUNTER — TELEPHONE (OUTPATIENT)
Dept: NEUROLOGY | Facility: CLINIC | Age: 32
End: 2022-03-04

## 2022-03-04 VITALS — BODY MASS INDEX: 24.33 KG/M2 | WEIGHT: 155 LBS | HEIGHT: 67 IN

## 2022-03-04 DIAGNOSIS — J01.90 ACUTE NON-RECURRENT SINUSITIS, UNSPECIFIED LOCATION: Primary | ICD-10-CM

## 2022-03-04 PROCEDURE — 99213 OFFICE O/P EST LOW 20 MIN: CPT | Performed by: PHYSICIAN ASSISTANT

## 2022-03-04 RX ORDER — FLUTICASONE PROPIONATE 50 MCG
2 SPRAY, SUSPENSION (ML) NASAL DAILY
Qty: 16 G | Refills: 2 | Status: SHIPPED | OUTPATIENT
Start: 2022-03-04

## 2022-03-04 RX ORDER — AMOXICILLIN 500 MG/1
500 CAPSULE ORAL EVERY 8 HOURS SCHEDULED
Qty: 30 CAPSULE | Refills: 0 | Status: SHIPPED | OUTPATIENT
Start: 2022-03-04 | End: 2022-03-14

## 2022-03-04 NOTE — TELEPHONE ENCOUNTER
Called pt to confirm upcoming appointment with [facility]  States CV location is not good for her and does not want to wait to see [facility] at [place]'s next opening or another provider  Asked to switch this appointment to virtual instead

## 2022-03-04 NOTE — PROGRESS NOTES
Virtual Regular Visit    Verification of patient location:    Patient is located in the following state in which I hold an active license [place]      Assessment/Plan:    Problem List Items Addressed This Visit     None      Visit Diagnoses     Acute non-recurrent sinusitis, unspecified location    -  Primary    Relevant Medications    amoxicillin (AMOXIL) 500 mg capsule    fluticasone (FLONASE) 50 mcg/act nasal spray               Reason for visit is   Chief Complaint   Patient presents with    Earache     sinus congestion; vaxxed and boosted for covid     Virtual Regular Visit        Encounter provider Amrita Cantu PA-C    Provider located at [facility] [address] [address]      Recent Visits  Date Type Provider Dept   03/01/22 Telephone NITESH Christian [facility]   Showing recent visits within past 7 days and meeting all other requirements  Today's Visits  Date Type Provider Dept   03/04/22 [facility]icine [facility]   Showing today's visits and meeting all other requirements  Future Appointments  No visits were found meeting these conditions  Showing future appointments within next 150 days and meeting all other requirements       The patient was identified by name and date of birth  Jason Ahn was informed that this is a telemedicine visit and that the visit is being conducted through [address] [place] [address] Now and patient was informed that this is a secure, HIPAA-compliant platform  She agrees to proceed     My office door was closed  No one else was in the room  She acknowledged consent and understanding of privacy and security of the video platform  The patient has agreed to participate and understands they can discontinue the visit at any time  Patient is aware this is a billable service  Subjective  Jason Ahn is a 32 y o  female    Marbella calls today with acute sinus symptoms x few days   Complaining of sinus congestion, runny nose, cough, L ear pain, and mild sore throat  Past Medical History:   Diagnosis Date    Acute appendicitis with localized peritonitis     Acute otitis media, right 3/23/2019    Anxiety     Anxiety and depression     Last Assessed: 4/17/2017    Depression     No meds currently  Last Assessed: 9/24/2015    Dysuria     Last Assessed: 8/1/2016    Endometriosis     Last Assessed: 1/23/2015    Fatigue     Last Assessed: 9/24/2015    Granulation tissue at vaginal vault     Last Assessed: 2/6/2017    Irregular menses     Migraines     Pelvic pain in female     Left pelvic area      Trichomoniasis     Resolved: 5/18/2017     Vaginal spotting     Last Assessed: 1/30/2017       Past Surgical History:   Procedure Laterality Date    APPENDECTOMY  10/06/2016    HYSTERECTOMY      LAPAROSCOPIC ENDOMETRIOSIS FULGURATION  2012    PELVIC LAPAROSCOPY      VT LAP,APPENDECTOMY N/A 10/6/2016    Procedure: APPENDECTOMY LAPAROSCOPIC;  Surgeon: Ekaterina Naylor MD;  [facility];  Service: General    VT LAP,DIAGNOSTIC ABDOMEN N/A 10/11/2021    Procedure: DX LAPAROSCOPY;  Surgeon: Prakash Colbert MD;  [facility];  Service: Gynecology    VT LAP,FULGURATE/EXCISE LESIONS N/A 10/11/2021    Procedure: Milbert Spore;  Surgeon: Prakash Colbert MD;  [facility];  Service: Gynecology    VT LAP,RMV  ADNEXAL STRUCTURE Right 10/11/2021    Procedure: OOPHORECTOMY;  Surgeon: Prakash Colbert MD;  [facility];  Service: Gynecology    VT LAPAROSCOPY Denette Seen <=250 GRAM  W TUBE/OVARY N/A 7/25/2016    Procedure: HYSTERECTOMY LAPAROSCOPIC TOTAL  LSO  right salpingectomy  diagnostic cystoscopy;  Surgeon: Prakash Colbert MD;  [facility];  Service: Gynecology    TUBAL LIGATION  03/2015    VAGINAL HYSTERECTOMY      Last Assessed: 9/16/2016       Current Outpatient Medications   Medication Sig Dispense Refill    ALPRAZolam (XANAX) 0 25 mg tablet Take 1 tablet (0 25 mg total) by mouth daily at bedtime as needed for anxiety 20 tablet 0    amitriptyline (ELAVIL) 10 mg tablet 30 mg qhs 90 tablet 2    busPIRone (BUSPAR) 10 mg tablet Take 1 tablet (10 mg total) by mouth 3 (three) times a day 270 tablet 0    estradiol (Wendy) 0 05 MG/24HR Place 1 patch on the skin 2 (two) times a week 8 patch 11    ketorolac (TORADOL) 10 mg tablet Take 1 tablet (10 mg total) by mouth every 6 (six) hours as needed (migraine) Max 2-3 per week  Take with food/antacid 10 tablet 2    Multiple Vitamins-Minerals (MULTIVITAL) tablet Take 1 tablet by mouth daily      amoxicillin (AMOXIL) 500 mg capsule Take 1 capsule (500 mg total) by mouth every 8 (eight) hours for 10 days 30 capsule 0    fluticasone (FLONASE) 50 mcg/act nasal spray 2 sprays into each nostril daily 16 g 2    gabapentin (NEURONTIN) 400 mg capsule 1 capsule BY MOUTH AT BEDTIME  (Patient not taking: Reported on 1/18/2022 ) 90 capsule 3     No current facility-administered medications for this visit  No Known Allergies    Review of Systems   Constitutional: Negative for activity change, appetite change, chills, diaphoresis, fatigue, fever and unexpected weight change  HENT: Positive for congestion, ear pain, postnasal drip, rhinorrhea, sinus pressure and sore throat  Negative for sinus pain, sneezing, tinnitus and voice change  Eyes: Negative for pain, redness and visual disturbance  Respiratory: Negative for cough, chest tightness, shortness of breath and wheezing  Cardiovascular: Negative for chest pain, palpitations and leg swelling  Gastrointestinal: Negative for abdominal pain, blood in stool, constipation, diarrhea, nausea and vomiting  Genitourinary: Negative for difficulty urinating, dysuria, frequency, hematuria and urgency  Musculoskeletal: Negative for arthralgias, back pain, gait problem, joint swelling, myalgias, neck pain and neck stiffness  Skin: Negative for color change, pallor, rash and wound  Neurological: Negative for dizziness, tremors, weakness, light-headedness and headaches  Psychiatric/Behavioral: Negative for dysphoric mood, self-injury, sleep disturbance and suicidal ideas  The patient is not nervous/anxious  Video Exam    Vitals:    03/04/22 1107   Weight: 70 3 kg (155 lb)   Height: 5' 7" (1 702 m)       Physical Exam  Vitals reviewed  Constitutional:       General: She is not in acute distress  Appearance: She is normal weight  She is ill-appearing  She is not toxic-appearing or diaphoretic  HENT:      Head: Normocephalic and atraumatic  Pulmonary:      Effort: Pulmonary effort is normal  No respiratory distress  Comments: Pt speaking in clear, fluent sentences without cough or apparent SOB  Neurological:      Mental Status: She is alert  I spent 10 minutes directly with the patient during this visit    VIRTUAL VISIT DISCLAIMER      Thai Aponte verbally agrees to participate in [employer]  Pt is aware that Virtual Care Services could be limited without vital signs or the ability to perform a full hands-on physical exam  Marbella Garcia understands she or the provider may request at any time to terminate the video visit and request the patient to seek care or treatment in person

## 2022-03-09 DIAGNOSIS — G44.321 INTRACTABLE CHRONIC POST-TRAUMATIC HEADACHE: Primary | ICD-10-CM

## 2022-03-09 DIAGNOSIS — G43.009 MIGRAINE WITHOUT AURA AND WITHOUT STATUS MIGRAINOSUS, NOT INTRACTABLE: ICD-10-CM

## 2022-03-16 ENCOUNTER — TELEMEDICINE (OUTPATIENT)
Dept: NEUROLOGY | Facility: CLINIC | Age: 32
End: 2022-03-16
Payer: COMMERCIAL

## 2022-03-16 DIAGNOSIS — G44.329 CHRONIC POST-TRAUMATIC HEADACHE, NOT INTRACTABLE: ICD-10-CM

## 2022-03-16 DIAGNOSIS — G43.009 MIGRAINE WITHOUT AURA AND WITHOUT STATUS MIGRAINOSUS, NOT INTRACTABLE: Primary | ICD-10-CM

## 2022-03-16 DIAGNOSIS — F41.9 ANXIETY AND DEPRESSION: ICD-10-CM

## 2022-03-16 DIAGNOSIS — F32.A ANXIETY AND DEPRESSION: ICD-10-CM

## 2022-03-16 PROCEDURE — 99213 OFFICE O/P EST LOW 20 MIN: CPT | Performed by: PHYSICIAN ASSISTANT

## 2022-03-16 RX ORDER — AMITRIPTYLINE HYDROCHLORIDE 10 MG/1
TABLET, FILM COATED ORAL
Qty: 90 TABLET | Refills: 1 | Status: SHIPPED | OUTPATIENT
Start: 2022-03-16

## 2022-03-16 NOTE — PROGRESS NOTES
Virtual Regular Visit    Verification of patient location:    Patient is located in the following state in which I hold an active license [place]      Assessment/Plan:    Problem List Items Addressed This Visit        Cardiovascular and Mediastinum    Migraine without aura and without status migrainosus, not intractable - Primary    Relevant Medications    amitriptyline (ELAVIL) 10 mg tablet       Other    Anxiety and depression    Relevant Medications    amitriptyline (ELAVIL) 10 mg tablet    Intractable chronic post-traumatic headache    Relevant Medications    amitriptyline (ELAVIL) 10 mg tablet        She states her headaches are significantly reduced s/p oophorectomy  She can continue Toradol p r n  Headache or migraine onset, but no more than neck 3 doses per week  She should not combine this with ibuprofen, and try to avoid ibuprofen more than 3 times per week as well  In fact it is probably best to avoid any rescue/analgesic medication more than 3 days per week to prevent medication overuse headache  She no longer needs the amitriptyline for her headaches, as she has been off of this since October with reduction of migraine headaches, however I am wondering if it would help her mood  She reports worsening anxiety recently and depression  She was referred to Psychiatry  I recommended that she make an appointment with Psychiatry, and in the meantime she can restart amitriptyline since we both agreed that while she was on it the mood was better  Side effects again reviewed  She should contact me if it does not help  She can also try magnesium for headache prevention and anxiety  Side effects reviewed  Recommended trying some relaxation, mindfulness techniques for anxiety  The patient should not hesitate to call me prior to her follow up with any questions or concerns           Reason for visit is   Chief Complaint   Patient presents with    Migraine    Virtual Regular Visit        Encounter provider Alize Lakhani PA-C    Provider located at [address]  [facility] [address] [facility]  [address]      Recent Visits  No visits were found meeting these conditions  Showing recent visits within past 7 days and meeting all other requirements  Today's Visits  Date Type Provider Dept   03/16/22 106 Michelle Powell PA-C [facility] today's visits and meeting all other requirements  Future Appointments  No visits were found meeting these conditions  Showing future appointments within next 150 days and meeting all other requirements       The patient was identified by name and date of birth  Jneifer Sheridan was informed that this is a telemedicine visit and that the visit is being conducted through [facility] and patient was informed this is a secure, HIPAA-complaint platform  She agrees to proceed     My office door was closed  No one else was in the room  She acknowledged consent and understanding of privacy and security of the video platform  The patient has agreed to participate and understands they can discontinue the visit at any time  Patient is aware this is a billable service  Dori Gonzáles is a 32 y o  female who is contacted via telemedicine for neurological follow-up  She states she just got engaged  HPI     She is s/p R oophorectomy 10/11/21  States she forgot her amitriptyline medication while in the hospital and discontinued it since  Thankfully the headaches have been reduced since this procedure and she does not feel that she needs amitriptyline for headache prevention a longer  However anxiety seems to be worse  She is wondering, as am I, if anxiety and depression are worse since stopping amitriptyline  She had a headache today and she took ibuprofen  She thinks that it was secondary to weather changes    She states that the ibuprofen helped to resolve the headache  She does not take ibuprofen frequently throughout the week, if at all  States she only took Toradol 4 times since I last saw her in the beginning of September  She was prescribed BuSpar by her PCP but she has difficulty remembering to take it 3 times a day  She was given a Psychiatry referral     She is sleeping well  Denies snoring or apnea like symptoms  Her partner does not observe any apnea symptoms  Prior documentation: 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 as a  soon        She reports she is waiting to see gynecology soon to discuss hot flashes, insomnia, worsening anxiety secondary to possible menopause symptoms  She had a hysterectomy in the past and she only has 1 ovary  While she is waiting for this appointment she feels that her headaches are getting worse due to stress      She gets a headache once a week  She calls it a headache attack  It sticks with her in [address]  Sometimes it lasts 3-4 hours  It mainly happens in the afternoon/ evening, possibly around 4:00 p m  Aminta Flushing She takes ibuprofen which helps lower the pain severity  The headache sometimes gets worse around 7:00 p m  One night on a Saturday she was debating whether not to go to the hospital due to the headache  Sometimes she uses Toradol which typically usually helps  She describes headaches as still at the apex of the head, no clear change in character since we last saw her  There is no nausea or vomiting, typically no light or sound sensitivity  One night she did have some light sound sensitivity and place an ice pack on her neck which was helpful  Night was really she did have some light and sound send ice pack on neck   She does not think the amitriptyline is causing insomnia, and she does not think she has any side effects to it  She feels that overall it is beneficial I reducing her headaches    She also kept taking gabapentin      For insomnia melatonin did not help, and then she found an over-the-counter sleep aid which are gummy bears but she does not remember what is in it  These help her thankfully      Prior note: The patient states she is getting more frequent migraine headaches, and she is unsure if gabapentin is helpful   Toradol does help at the onset of a migraine headache   She denies side effects to either of these medications      She states she gets a migraine headaches about every 3 days for the past 1 5 months  Typically 6-7/10 on average, some days are 3/10, some days are 5/10, at the apex of the head, she takes toradol when 7-8/10  She is unsure of triggers  She describes the headache as pounding, and it feels like she wants to hit her head off of a wall, sometimes it feels like pressure  Denies n/v, denies L and S sensitivity  States headaches have been worse sicne her accident as noted in prior notes      She endorses dizziness if she gets up too quick sometimes from sitting  She drinks 4-5 bottles per day (big bottles per her hx)  She drinks a little coffee every morning, some days drinks coke later in the day     Triggers- weather, cold (has to wear a hat or ears will start hurting)     S/p hysterectomy- 7/15/15, with h/o endometriosis  Still has only her R ovary      She stopped CPAP because she waking up in the middle of the night from it  Has mild KAI, feels well rested     Does acupuncture now for back pain  Past Medical History:   Diagnosis Date    Acute appendicitis with localized peritonitis     Acute otitis media, right 3/23/2019    Anxiety     Anxiety and depression     Last Assessed: 4/17/2017    Depression     No meds currently   Last Assessed: 9/24/2015    Dysuria     Last Assessed: 8/1/2016    Endometriosis     Last Assessed: 1/23/2015    Fatigue     Last Assessed: 9/24/2015    Granulation tissue at vaginal vault     Last Assessed: 2/6/2017    Irregular menses     Migraines     Pelvic pain in female Left pelvic area   Trichomoniasis     Resolved: 5/18/2017     Vaginal spotting     Last Assessed: 1/30/2017       Past Surgical History:   Procedure Laterality Date    APPENDECTOMY  10/06/2016    HYSTERECTOMY      LAPAROSCOPIC ENDOMETRIOSIS FULGURATION  2012    PELVIC LAPAROSCOPY      MN LAP,APPENDECTOMY N/A 10/6/2016    Procedure: APPENDECTOMY LAPAROSCOPIC;  Surgeon: Cecilia Rodrigues MD;  [facility];  Service: General    MN LAP,DIAGNOSTIC ABDOMEN N/A 10/11/2021    Procedure: DX LAPAROSCOPY;  Surgeon: Barbara Chen MD;  [facility];  Service: Gynecology    MN LAP,FULGURATE/EXCISE LESIONS N/A 10/11/2021    Procedure: Jurline Lemmings;  Surgeon: Barbara Chen MD;  [facility];  Service: Gynecology    MN LAP,RMV  ADNEXAL STRUCTURE Right 10/11/2021    Procedure: OOPHORECTOMY;  Surgeon: Barbara Chen MD;  [facility];  Service: Gynecology    MN LAPAROSCOPY W TOT HYSTERECTUTERUS <=250 GRAM  W TUBE/OVARY N/A 7/25/2016    Procedure: HYSTERECTOMY LAPAROSCOPIC TOTAL  LSO  right salpingectomy  diagnostic cystoscopy;  Surgeon: Barbara Chen MD;  [facility];  Service: Gynecology    TUBAL LIGATION  03/2015    VAGINAL HYSTERECTOMY      Last Assessed: 9/16/2016       Current Outpatient Medications   Medication Sig Dispense Refill    ALPRAZolam (XANAX) 0 25 mg tablet Take 1 tablet (0 25 mg total) by mouth daily at bedtime as needed for anxiety 20 tablet 0    amitriptyline (ELAVIL) 10 mg tablet 1 tab qhs 90 tablet 1    busPIRone (BUSPAR) 10 mg tablet Take 1 tablet (10 mg total) by mouth 3 (three) times a day 270 tablet 0    estradiol (Wendy) 0 05 MG/24HR Place 1 patch on the skin 2 (two) times a week 8 patch 11    fluticasone (FLONASE) 50 mcg/act nasal spray 2 sprays into each nostril daily 16 g 2    ketorolac (TORADOL) 10 mg tablet Take 1 tablet (10 mg total) by mouth every 6 (six) hours as needed (migraine) Max 2-3 per week   Take with food/antacid 10 tablet 2    Multiple Vitamins-Minerals (MULTIVITAL) tablet Take 1 tablet by mouth daily       No current facility-administered medications for this visit  No Known Allergies    Review of Systems   Constitutional: Negative  Negative for appetite change and fever  HENT: Negative  Negative for hearing loss, tinnitus, trouble swallowing and voice change  Eyes: Negative  Negative for photophobia and pain  Respiratory: Negative  Negative for shortness of breath  Cardiovascular: Negative  Negative for palpitations  Gastrointestinal: Negative  Negative for nausea and vomiting  Endocrine: Negative  Negative for cold intolerance  Genitourinary: Negative  Negative for dysuria, frequency and urgency  Musculoskeletal: Negative  Negative for myalgias and neck pain  Skin: Negative  Negative for rash  Neurological: Positive for headaches  Negative for dizziness, tremors, seizures, syncope, facial asymmetry, speech difficulty, weakness, light-headedness and numbness  Hematological: Negative  Does not bruise/bleed easily  Psychiatric/Behavioral: Negative  Negative for confusion, hallucinations and sleep disturbance  The following portions of the patient's history were reviewed and updated as appropriate: allergies, current medications/ medication history, past family history, past medical history, past social history, past surgical history and problem list     Review of systems was reviewed and otherwise unremarkable from a neurological perspective  Video Exam    There were no vitals filed for this visit  Physical Exam   Neurological exam:  On neurologic exam, the patient is alert and oriented to time and place  Speech is fluent and articulate, and the patient follows commands appropriately  Judgment and affect appear normal   Extraocular muscles are intact without nystagmus  Face is symmetric  Hearing is intact bilaterally  Motor examination reveals adequate range of motion        I spent 20 minutes directly with the patient during this visit    VIRTUAL VISIT DISCLAIMER      Dileep Bernal verbally agrees to participate in [employer]  Pt is aware that Virtual Care Services could be limited without vital signs or the ability to perform a full hands-on physical exam  Marbella Garcia understands she or the provider may request at any time to terminate the video visit and request the patient to seek care or treatment in person

## 2022-05-09 ENCOUNTER — OFFICE VISIT (OUTPATIENT)
Dept: FAMILY MEDICINE CLINIC | Facility: CLINIC | Age: 32
End: 2022-05-09
Payer: COMMERCIAL

## 2022-05-09 VITALS
WEIGHT: 156 LBS | TEMPERATURE: 98.1 F | DIASTOLIC BLOOD PRESSURE: 68 MMHG | BODY MASS INDEX: 24.48 KG/M2 | HEIGHT: 67 IN | HEART RATE: 83 BPM | SYSTOLIC BLOOD PRESSURE: 100 MMHG | OXYGEN SATURATION: 93 %

## 2022-05-09 DIAGNOSIS — J98.8 RESPIRATORY INFECTION: Primary | ICD-10-CM

## 2022-05-09 DIAGNOSIS — B34.9 VIRAL INFECTION, UNSPECIFIED: ICD-10-CM

## 2022-05-09 PROCEDURE — 87636 SARSCOV2 & INF A&B AMP PRB: CPT | Performed by: PHYSICIAN ASSISTANT

## 2022-05-09 PROCEDURE — 99214 OFFICE O/P EST MOD 30 MIN: CPT | Performed by: PHYSICIAN ASSISTANT

## 2022-05-09 RX ORDER — ESCITALOPRAM OXALATE 5 MG/1
5 TABLET ORAL DAILY
COMMUNITY
Start: 2022-04-29

## 2022-05-09 RX ORDER — CLONAZEPAM 0.5 MG/1
0.5 TABLET ORAL EVERY 12 HOURS PRN
COMMUNITY
Start: 2022-04-29

## 2022-05-09 RX ORDER — AZITHROMYCIN 250 MG/1
TABLET, FILM COATED ORAL
Qty: 6 TABLET | Refills: 0 | Status: SHIPPED | OUTPATIENT
Start: 2022-05-09 | End: 2022-05-13

## 2022-05-09 NOTE — PROGRESS NOTES
COVID-19 Outpatient Progress Note    Assessment/Plan:    Problem List Items Addressed This Visit     None      Visit Diagnoses     Respiratory infection    -  Primary    Relevant Medications    azithromycin (Zithromax) 250 mg tablet    Viral infection, unspecified        Relevant Orders    Covid/Flu- Office Collect         Disposition:     PCR testing will be performed to test for COVID-19/Influenza  Patient is fully vaccinated and I recommended self quarantine for 5 days followed by strict mask use for an additional 5 days  If patient were to develop symptoms, they should immediately self isolate and call our office for further guidance  Discussed symptom directed medication options with patient  I have spent 10 minutes directly with the patient  Greater than 50% of this time was spent in counseling/coordination of care regarding: prognosis, risks and benefits of treatment options, instructions for management, patient and family education, risk factor reductions and impressions  Encounter provider Monroe Waite PA-C    Provider located at [address] [place] [id number]    Recent Visits  No visits were found meeting these conditions  Showing recent visits within past 7 days and meeting all other requirements  Today's Visits  Date Type Provider Dept   05/09/22 Office Visit Monroe Waite PA-C [facility]   Showing today's visits and meeting all other requirements  Future Appointments  No visits were found meeting these conditions  Showing future appointments within next 150 days and meeting all other requirements     Subjective:   Girish Camarena is a 32 y o  female who is concerned about COVID-19  Patient's symptoms include fatigue, nasal congestion, rhinorrhea, sore throat and chest tightness  Patient denies fever, chills, anosmia, loss of taste, cough, shortness of breath, abdominal pain, nausea, vomiting, diarrhea, myalgias and headaches       - Date of symptom onset: 5/8/2022      COVID-19 vaccination status: Fully vaccinated (primary series)    Exposure:   Contact with a person who is under investigation (PUI) for or who is positive for COVID-19 within the last 14 days?: No    Hospitalized recently for fever and/or lower respiratory symptoms?: No      Currently a healthcare worker that is involved in direct patient care?: No      Works in a special setting where the risk of COVID-19 transmission may be high? (this may include long-term care, correctional and FPC facilities; homeless shelters; assisted-living facilities and group homes ): No      Resident in a special setting where the risk of COVID-19 transmission may be high? (this may include long-term care, correctional and FPC facilities; homeless shelters; assisted-living facilities and group homes ): No      Lab Results   Component Value Date    SARSCOV2 Negative 09/24/2021    [address] [facility] Not Detected 11/09/2020     Past Medical History:   Diagnosis Date    Acute appendicitis with localized peritonitis     Acute otitis media, right 3/23/2019    Anxiety     Anxiety and depression     Last Assessed: 4/17/2017    Depression     No meds currently  Last Assessed: 9/24/2015    Dysuria     Last Assessed: 8/1/2016    Endometriosis     Last Assessed: 1/23/2015    Fatigue     Last Assessed: 9/24/2015    Granulation tissue at vaginal vault     Last Assessed: 2/6/2017    Irregular menses     Migraines     Pelvic pain in female     Left pelvic area      Trichomoniasis     Resolved: 5/18/2017     Vaginal spotting     Last Assessed: 1/30/2017     Past Surgical History:   Procedure Laterality Date    APPENDECTOMY  10/06/2016    HYSTERECTOMY      LAPAROSCOPIC ENDOMETRIOSIS FULGURATION  2012    PELVIC LAPAROSCOPY      VA LAP,APPENDECTOMY N/A 10/6/2016    Procedure: APPENDECTOMY LAPAROSCOPIC;  Surgeon: Caren Rose MD;  [facility];  Service: General    VA LAP,DIAGNOSTIC ABDOMEN N/A 10/11/2021    Procedure: DX LAPAROSCOPY;  Surgeon: Jhoana Willoughby MD;  [facility];  Service: Gynecology    NE LAP,FULGURATE/EXCISE LESIONS N/A 10/11/2021    Procedure: Jessee Puff;  Surgeon: Jhoana Willoughby MD;  [facility];  Service: Gynecology    NE LAP,RMV  ADNEXAL STRUCTURE Right 10/11/2021    Procedure: OOPHORECTOMY;  Surgeon: Jhoana Willoughby MD;  [facility];  Service: Gynecology    NE LAPAROSCOPY W TOT HYSTERECTUTERUS <=250 [employer]  W TUBE/OVARY N/A 7/25/2016    Procedure: HYSTERECTOMY LAPAROSCOPIC TOTAL  LSO  right salpingectomy  diagnostic cystoscopy;  Surgeon: Jhoana Willoughby MD;  [facility];  Service: Gynecology    TUBAL LIGATION  03/2015    VAGINAL HYSTERECTOMY      Last Assessed: 9/16/2016     Current Outpatient Medications   Medication Sig Dispense Refill    amitriptyline (ELAVIL) 10 mg tablet 1 tab qhs 90 tablet 1    estradiol (Wendy) 0 05 MG/24HR Place 1 patch on the skin 2 (two) times a week 8 patch 11    fluticasone (FLONASE) 50 mcg/act nasal spray 2 sprays into each nostril daily 16 g 2    ketorolac (TORADOL) 10 mg tablet Take 1 tablet (10 mg total) by mouth every 6 (six) hours as needed (migraine) Max 2-3 per week  Take with food/antacid 10 tablet 2    Multiple Vitamins-Minerals (MULTIVITAL) tablet Take 1 tablet by mouth daily      ALPRAZolam (XANAX) 0 25 mg tablet Take 1 tablet (0 25 mg total) by mouth daily at bedtime as needed for anxiety (Patient not taking: Reported on 5/9/2022 ) 20 tablet 0    azithromycin (Zithromax) 250 mg tablet Day 1 take 2 tablets, days 2-5 take 1 tablet  6 tablet 0    busPIRone (BUSPAR) 10 mg tablet Take 1 tablet (10 mg total) by mouth 3 (three) times a day 270 tablet 0    clonazePAM (KlonoPIN) 0 5 mg tablet Take 0 5 mg by mouth every 12 (twelve) hours as needed      escitalopram (LEXAPRO) 5 mg tablet Take 5 mg by mouth daily       No current facility-administered medications for this visit       No Known Allergies    Review of Systems   Constitutional: Positive for fatigue  Negative for chills and fever  HENT: Positive for congestion, rhinorrhea and sore throat  Respiratory: Positive for chest tightness  Negative for cough and shortness of breath  Gastrointestinal: Negative for abdominal pain, diarrhea, nausea and vomiting  Musculoskeletal: Negative for myalgias  Neurological: Negative for headaches  Objective:    Vitals:    05/09/22 1118   BP: 100/68   Pulse: 83   Temp: 98 1 °F (36 7 °C)   SpO2: 93%   Weight: 70 8 kg (156 lb)   Height: 5' 7" (1 702 m)       Physical Exam  Vitals reviewed  Constitutional:       General: She is not in acute distress  Appearance: She is normal weight  She is ill-appearing  She is not toxic-appearing or diaphoretic  HENT:      Head: Normocephalic and atraumatic  Right Ear: Tympanic membrane, ear canal and external ear normal  There is no impacted cerumen  Left Ear: Tympanic membrane, ear canal and external ear normal  There is no impacted cerumen  Nose: Congestion and rhinorrhea present  Mouth/Throat:      Mouth: Mucous membranes are moist       Pharynx: No posterior oropharyngeal erythema  Cardiovascular:      Rate and Rhythm: Normal rate and regular rhythm  Pulses: Normal pulses  Heart sounds: Normal heart sounds  Pulmonary:      Effort: Pulmonary effort is normal       Breath sounds: Normal breath sounds  No wheezing or rhonchi  Lymphadenopathy:      Cervical: Cervical adenopathy present  Skin:     General: Skin is warm and dry  Neurological:      Mental Status: She is alert and oriented to person, place, and time  VIRTUAL VISIT DISCLAIMER    Violeta Smith verbally agrees to participate in [employer]   Pt is aware that Virtual Care Services could be limited without vital signs or the ability to perform a full hands-on physical exam  Marbella Garcia understands she or the provider may request at any time to terminate the video visit and request the patient to seek care or treatment in person

## 2022-05-10 LAB
FLUAV RNA RESP QL NAA+PROBE: NEGATIVE
FLUBV RNA RESP QL NAA+PROBE: NEGATIVE
SARS-COV-2 RNA RESP QL NAA+PROBE: NEGATIVE

## 2022-06-15 ENCOUNTER — TELEPHONE (OUTPATIENT)
Dept: FAMILY MEDICINE CLINIC | Facility: CLINIC | Age: 32
End: 2022-06-15

## 2022-06-15 DIAGNOSIS — R00.2 PALPITATIONS: Primary | ICD-10-CM

## 2022-06-15 NOTE — TELEPHONE ENCOUNTER
Patient made appt with Cardio (7/11) due to palpitations again  They wanted her to call you to find out if you wanted any testing done prior to them seeing her? Or if you had any recommendations

## 2022-06-21 ENCOUNTER — HOSPITAL ENCOUNTER (OUTPATIENT)
Dept: NON INVASIVE DIAGNOSTICS | Facility: HOSPITAL | Age: 32
Discharge: HOME | End: 2022-06-21
Payer: COMMERCIAL

## 2022-06-21 DIAGNOSIS — R00.2 PALPITATIONS: ICD-10-CM

## 2022-06-21 PROCEDURE — 93225 XTRNL ECG REC<48 HRS REC: CPT

## 2022-06-21 PROCEDURE — 93226 XTRNL ECG REC<48 HR SCAN A/R: CPT

## 2022-06-28 PROCEDURE — 93227 XTRNL ECG REC<48 HR R&I: CPT | Performed by: INTERNAL MEDICINE

## 2022-07-05 ENCOUNTER — TELEPHONE (OUTPATIENT)
Dept: FAMILY MEDICINE CLINIC | Facility: CLINIC | Age: 32
End: 2022-07-05

## 2022-07-05 NOTE — TELEPHONE ENCOUNTER
Be sure to keep upcoming cardiology appointment          ----- Message from [facility] sent at 7/5/2022  1:27 PM EDT -----  Regarding: FW: Results    ----- Message -----  From: Dagoberto Carcamo  Sent: 7/5/2022   1:25 PM EDT  To: [facility]  Subject: Results                                          i do not have episodes everyday  i get them once every week to 2  weeks so i  wasn't surprised nothing came up   i did have pulpitation and my heart rate has gone up passed 150 bpm like 2 days ago but it seems alright today so far

## 2022-07-11 ENCOUNTER — OFFICE VISIT (OUTPATIENT)
Dept: CARDIOLOGY CLINIC | Facility: CLINIC | Age: 32
End: 2022-07-11
Payer: COMMERCIAL

## 2022-07-11 VITALS
SYSTOLIC BLOOD PRESSURE: 110 MMHG | WEIGHT: 149 LBS | DIASTOLIC BLOOD PRESSURE: 88 MMHG | HEART RATE: 67 BPM | HEIGHT: 67 IN | BODY MASS INDEX: 23.39 KG/M2

## 2022-07-11 DIAGNOSIS — R00.2 PALPITATIONS: Primary | ICD-10-CM

## 2022-07-11 DIAGNOSIS — I49.3 PVC (PREMATURE VENTRICULAR CONTRACTION): ICD-10-CM

## 2022-07-11 DIAGNOSIS — I49.1 PAC (PREMATURE ATRIAL CONTRACTION): ICD-10-CM

## 2022-07-11 PROCEDURE — 99204 OFFICE O/P NEW MOD 45 MIN: CPT | Performed by: INTERNAL MEDICINE

## 2022-07-11 PROCEDURE — 93000 ELECTROCARDIOGRAM COMPLETE: CPT | Performed by: INTERNAL MEDICINE

## 2022-07-11 NOTE — PROGRESS NOTES
Cardiology Consultation     Dagoberto Carcamo  [id number]  1990  [facility] Rochelle Camacho  [facility] [address] [address]      1  Palpitations  POCT ECG   2  PAC (premature atrial contraction)     3  PVC (premature ventricular contraction)         Discussion/Summary:  1  Intermittent palpitations  2  PACs  3  PVCs    -EKG in office today shows sinus rhythm heart rate 67 beats per minute  -will check 1 month ambulatory event monitor to determine underlying etiology of palpitations  -will check transthoracic echocardiogram to evaluate overall cardiac structure and function  -will check laboratory studies including TSH, CBC, CMP monitor for any potential secondary etiology  -will see patient in 2 months or sooner if necessary once testing is completed  -patient counseled on dietary lifestyle modifications including following a heart healthy diet with monitoring for any significant triggers  -patient counseled if she were to have any warning or alarm type symptoms she is to seek emergency medical care immediately  History of Present Illness:  - patient is a 51-year-old female with history of endometriosis s/p hysterectomy, intermittent palpitations over the last several years who presents to the office today for initial evaluation in the setting of worsening frequency and intensity of palpitations  Patient notes that over the last 2 years and more specifically over the last several months her palpitations have been increasing in frequency and duration along with intensity  She notes that she has been monitoring intermittently through her Apple watch with heart rates as high as almost 200 beats per minute  She did recently undergo Holter monitoring which was relatively unrevealing however patient did not have any active symptoms during the monitored time frame    She notes that her symptoms happen couple times a week but can go even further out between symptoms  The concerning thing to the patient is that her symptoms do cause her to have some lightheadedness along with discomfort and while she does not get these symptoms with exercise has not noticed any triggering event for these palpitations  She states that the longest duration of them lasted approximately 3 minutes  -patient denies any tobacco or illicit drug use and has intermittent social alcohol use   -patient notes significant family history of heart disease in father with mitral valve prolapse, and a paternal grandmother uncles and father's had MIs however grandmother does not have any active issues and neither as patient's mother  Patient Active Problem List   Diagnosis    Anxiety and depression    Migraine without aura and without status migrainosus, not intractable    Excessive daytime sleepiness    Insomnia due to medical condition    Chronic post-traumatic headache, intractable    Intractable chronic post-traumatic headache     Past Medical History:   Diagnosis Date    Acute appendicitis with localized peritonitis     Acute otitis media, right 3/23/2019    Anxiety     Anxiety and depression     Last Assessed: 4/17/2017    Depression     No meds currently  Last Assessed: 9/24/2015    Dysuria     Last Assessed: 8/1/2016    Endometriosis     Last Assessed: 1/23/2015    Fatigue     Last Assessed: 9/24/2015    Granulation tissue at vaginal vault     Last Assessed: 2/6/2017    Irregular menses     Migraines     Pelvic pain in female     Left pelvic area      Trichomoniasis     Resolved: 5/18/2017     Vaginal spotting     Last Assessed: 1/30/2017     Social History     Socioeconomic History    Marital status:      Spouse name: Not on file    Number of children: Not on file    Years of education: Not on file    Highest education level: Not on file   Occupational History    Not on file   Tobacco Use  Smoking status: Former Smoker     Years: 5 00     Types: Cigarettes     Quit date: 2013     Years since quittin 6    Smokeless tobacco: Never Used    Tobacco comment: Quit 5 years ago   Smoked 5 cigarettes daily   Vaping Use    Vaping Use: Every day   Substance and Sexual Activity    Alcohol use: Yes     Comment: Socially    Drug use: No    Sexual activity: Yes     Partners: Male   Other Topics Concern    Not on file   Social History Narrative    No living will    Marital Problem - going through divorce     Social Determinants of Health     Financial Resource Strain: Not on file   Food Insecurity: Not on file   Transportation Needs: Not on file   Physical Activity: Not on file   Stress: Not on file   Social Connections: Not on file   Intimate Partner Violence: Not on file   Housing Stability: Not on file      Family History   Problem Relation Age of Onset    Thyroid disease Mother     Sleep apnea Mother     Parkinsonism Paternal Grandfather      Past Surgical History:   Procedure Laterality Date    APPENDECTOMY  10/06/2016    HYSTERECTOMY      LAPAROSCOPIC ENDOMETRIOSIS FULGURATION      PELVIC LAPAROSCOPY      SD LAP,APPENDECTOMY N/A 10/6/2016    Procedure: APPENDECTOMY LAPAROSCOPIC;  Surgeon: Emily Malone MD;  [facility];  Service: General    SD LAP,DIAGNOSTIC ABDOMEN N/A 10/11/2021    Procedure: DX LAPAROSCOPY;  Surgeon: Phuong Musa MD;  [facility];  Service: Gynecology    SD LAP,FULGURATE/EXCISE LESIONS N/A 10/11/2021    Procedure: Saul Salts;  Surgeon: Phuong Musa MD;  [facility];  Service: Gynecology    SD LAP,RMV  ADNEXAL STRUCTURE Right 10/11/2021    Procedure: OOPHORECTOMY;  Surgeon: Phuong Musa MD;  [facility];  Service: Gynecology    SD LAPAROSCOPY Ravi Orozco <=250 GRAM  W TUBE/OVARY N/A 2016    Procedure: HYSTERECTOMY LAPAROSCOPIC TOTAL  LSO  right salpingectomy  diagnostic cystoscopy;  Surgeon: Alyson Parson MD;  Location: [facility];  Service: Gynecology    TUBAL LIGATION  03/2015    VAGINAL HYSTERECTOMY      Last Assessed: 9/16/2016       Current Outpatient Medications:     amitriptyline (ELAVIL) 10 mg tablet, 1 tab qhs, Disp: 90 tablet, Rfl: 1    clonazePAM (KlonoPIN) 0 5 mg tablet, Take 0 5 mg by mouth every 12 (twelve) hours as needed, Disp: , Rfl:     escitalopram (LEXAPRO) 5 mg tablet, Take 5 mg by mouth daily, Disp: , Rfl:     estradiol (Wendy) 0 05 MG/24HR, Place 1 patch on the skin 2 (two) times a week, Disp: 8 patch, Rfl: 11    Multiple Vitamins-Minerals (MULTIVITAL) tablet, Take 1 tablet by mouth daily, Disp: , Rfl:     ALPRAZolam (XANAX) 0 25 mg tablet, Take 1 tablet (0 25 mg total) by mouth daily at bedtime as needed for anxiety (Patient not taking: No sig reported), Disp: 20 tablet, Rfl: 0    busPIRone (BUSPAR) 10 mg tablet, Take 1 tablet (10 mg total) by mouth 3 (three) times a day (Patient not taking: Reported on 7/11/2022), Disp: 270 tablet, Rfl: 0    fluticasone (FLONASE) 50 mcg/act nasal spray, 2 sprays into each nostril daily (Patient not taking: Reported on 7/11/2022), Disp: 16 g, Rfl: 2    ketorolac (TORADOL) 10 mg tablet, Take 1 tablet (10 mg total) by mouth every 6 (six) hours as needed (migraine) Max 2-3 per week  Take with food/antacid (Patient not taking: Reported on 7/11/2022), Disp: 10 tablet, Rfl: 2  No Known Allergies      Labs:  No visits with results within 2 Month(s) from this visit  Latest known visit with results is:   Office Visit on 05/09/2022   Component Date Value    SARS-CoV-2 05/09/2022 Negative     INFLUENZA A PCR 05/09/2022 Negative     INFLUENZA B PCR 05/09/2022 Negative         Imaging: Holter monitor    Result Date: 6/28/2022  Narrative: Indications: Palpitations A Holter Monitor was performed for  48 hours  Impression: The basic mechanism was sinus with rates ranging from  45 beats per minute to 143 beats per minute   The average heart rate was 68 beats per minute  Supraventricular ectopic beats were rare totaling 8 beats  Ventricular ectopic beats were rare totaling 57 beats and there were no runs  There were no significant pauses  No symptoms were noted during the monitoring phase  Christiana Cates MD         Review of Systems:  Review of Systems   Constitutional: Negative for chills, diaphoresis, fatigue, fever and unexpected weight change  HENT: Negative for trouble swallowing and voice change  Eyes: Negative for pain and redness  Respiratory: Negative for shortness of breath and wheezing  Cardiovascular: Negative for chest pain, palpitations and leg swelling  Gastrointestinal: Negative for abdominal pain, constipation, diarrhea, nausea and vomiting  Genitourinary: Negative for dysuria  Musculoskeletal: Negative for neck pain and neck stiffness  Skin: Negative for rash  Neurological: Negative for dizziness, syncope, light-headedness and headaches  Psychiatric/Behavioral: Negative for agitation and confusion  All other systems reviewed and are negative  Vitals:    07/11/22 1538   BP: 110/88   Pulse: 67   Weight: 67 6 kg (149 lb)   Height: 5' 7" (1 702 m)     Vitals:    07/11/22 1538   Weight: 67 6 kg (149 lb)     Height: 5' 7" (170 2 cm)     Physical Exam:  Physical Exam  Vitals reviewed  Constitutional:       General: She is not in acute distress  Appearance: Normal appearance  She is not diaphoretic  HENT:      Head: Normocephalic and atraumatic  Eyes:      General:         Right eye: No discharge  Left eye: No discharge  Neck:      Comments: Trachea midline, no JVD present  Cardiovascular:      Rate and Rhythm: Normal rate and regular rhythm  Heart sounds: No friction rub  Pulmonary:      Effort: Pulmonary effort is normal  No respiratory distress  Breath sounds: No wheezing or rhonchi  Abdominal:      General: Bowel sounds are normal       Palpations: Abdomen is soft  Tenderness: There is no abdominal tenderness  There is no rebound  Musculoskeletal:      Right lower leg: No edema  Left lower leg: No edema  Skin:     General: Skin is warm and dry  Neurological:      Mental Status: She is alert  Comments: Awake, alert, able to answer questions appropriately, able move extremities bilaterally     Psychiatric:         Mood and Affect: Mood normal          Behavior: Behavior normal

## 2022-07-13 ENCOUNTER — APPOINTMENT (OUTPATIENT)
Dept: LAB | Facility: CLINIC | Age: 32
End: 2022-07-13
Payer: COMMERCIAL

## 2022-07-13 LAB
ALBUMIN SERPL BCP-MCNC: 4 G/DL (ref 3.5–5)
ALP SERPL-CCNC: 53 U/L (ref 46–116)
ALT SERPL W P-5'-P-CCNC: 17 U/L (ref 12–78)
ANION GAP SERPL CALCULATED.3IONS-SCNC: 5 MMOL/L (ref 4–13)
AST SERPL W P-5'-P-CCNC: 10 U/L (ref 5–45)
BASOPHILS # BLD AUTO: 0.02 THOUSANDS/ΜL (ref 0–0.1)
BASOPHILS NFR BLD AUTO: 1 % (ref 0–1)
BILIRUB SERPL-MCNC: 0.59 MG/DL (ref 0.2–1)
BUN SERPL-MCNC: 12 MG/DL (ref 5–25)
CALCIUM SERPL-MCNC: 9.2 MG/DL (ref 8.3–10.1)
CHLORIDE SERPL-SCNC: 110 MMOL/L (ref 100–108)
CO2 SERPL-SCNC: 27 MMOL/L (ref 21–32)
CREAT SERPL-MCNC: 0.83 MG/DL (ref 0.6–1.3)
EOSINOPHIL # BLD AUTO: 0.07 THOUSAND/ΜL (ref 0–0.61)
EOSINOPHIL NFR BLD AUTO: 2 % (ref 0–6)
ERYTHROCYTE [DISTWIDTH] IN BLOOD BY AUTOMATED COUNT: 12.2 % (ref 11.6–15.1)
GFR SERPL CREATININE-BSD FRML MDRD: 94 ML/MIN/1.73SQ M
GLUCOSE P FAST SERPL-MCNC: 92 MG/DL (ref 65–99)
HCT VFR BLD AUTO: 36.8 % (ref 34.8–46.1)
HGB BLD-MCNC: 12.4 G/DL (ref 11.5–15.4)
IMM GRANULOCYTES # BLD AUTO: 0 THOUSAND/UL (ref 0–0.2)
IMM GRANULOCYTES NFR BLD AUTO: 0 % (ref 0–2)
LYMPHOCYTES # BLD AUTO: 1.78 THOUSANDS/ΜL (ref 0.6–4.47)
LYMPHOCYTES NFR BLD AUTO: 47 % (ref 14–44)
MCH RBC QN AUTO: 30.8 PG (ref 26.8–34.3)
MCHC RBC AUTO-ENTMCNC: 33.7 G/DL (ref 31.4–37.4)
MCV RBC AUTO: 91 FL (ref 82–98)
MONOCYTES # BLD AUTO: 0.32 THOUSAND/ΜL (ref 0.17–1.22)
MONOCYTES NFR BLD AUTO: 9 % (ref 4–12)
NEUTROPHILS # BLD AUTO: 1.55 THOUSANDS/ΜL (ref 1.85–7.62)
NEUTS SEG NFR BLD AUTO: 41 % (ref 43–75)
NRBC BLD AUTO-RTO: 0 /100 WBCS
PLATELET # BLD AUTO: 180 THOUSANDS/UL (ref 149–390)
PMV BLD AUTO: 12.3 FL (ref 8.9–12.7)
POTASSIUM SERPL-SCNC: 3.9 MMOL/L (ref 3.5–5.3)
PROT SERPL-MCNC: 7.6 G/DL (ref 6.4–8.2)
RBC # BLD AUTO: 4.03 MILLION/UL (ref 3.81–5.12)
SODIUM SERPL-SCNC: 142 MMOL/L (ref 136–145)
TSH SERPL DL<=0.05 MIU/L-ACNC: 3.29 UIU/ML (ref 0.45–4.5)
WBC # BLD AUTO: 3.74 THOUSAND/UL (ref 4.31–10.16)

## 2022-07-13 PROCEDURE — 85025 COMPLETE CBC W/AUTO DIFF WBC: CPT | Performed by: INTERNAL MEDICINE

## 2022-07-13 PROCEDURE — 80053 COMPREHEN METABOLIC PANEL: CPT | Performed by: INTERNAL MEDICINE

## 2022-07-13 PROCEDURE — 36415 COLL VENOUS BLD VENIPUNCTURE: CPT | Performed by: INTERNAL MEDICINE

## 2022-07-13 PROCEDURE — 84443 ASSAY THYROID STIM HORMONE: CPT | Performed by: INTERNAL MEDICINE

## 2022-08-08 ENCOUNTER — HOSPITAL ENCOUNTER (OUTPATIENT)
Dept: NON INVASIVE DIAGNOSTICS | Facility: CLINIC | Age: 32
Discharge: HOME | End: 2022-08-08
Payer: COMMERCIAL

## 2022-08-08 VITALS
WEIGHT: 149 LBS | HEART RATE: 67 BPM | BODY MASS INDEX: 23.39 KG/M2 | SYSTOLIC BLOOD PRESSURE: 110 MMHG | DIASTOLIC BLOOD PRESSURE: 88 MMHG | HEIGHT: 67 IN

## 2022-08-08 DIAGNOSIS — R00.2 PALPITATIONS: ICD-10-CM

## 2022-08-08 DIAGNOSIS — I49.3 PVC (PREMATURE VENTRICULAR CONTRACTION): ICD-10-CM

## 2022-08-08 DIAGNOSIS — I49.1 PAC (PREMATURE ATRIAL CONTRACTION): ICD-10-CM

## 2022-08-08 LAB
AORTIC ROOT: 2.7 CM
AORTIC VALVE MEAN VELOCITY: 7.2 M/S
APICAL FOUR CHAMBER EJECTION FRACTION: 64 %
ASCENDING AORTA: 2.8 CM
AV AREA BY CONTINUOUS VTI: 2.6 CM2
AV AREA PEAK VELOCITY: 2.6 CM2
AV LVOT MEAN GRADIENT: 2 MMHG
AV LVOT PEAK GRADIENT: 4 MMHG
AV MEAN GRADIENT: 3 MMHG
AV PEAK GRADIENT: 6 MMHG
AV VALVE AREA: 2.56 CM2
AV VELOCITY RATIO: 0.81
DOP CALC AO PEAK VEL: 1.23 M/S
DOP CALC AO VTI: 26.32 CM
DOP CALC LVOT AREA: 3.14 CM2
DOP CALC LVOT DIAMETER: 2 CM
DOP CALC LVOT PEAK VEL VTI: 21.44 CM
DOP CALC LVOT PEAK VEL: 1 M/S
DOP CALC LVOT STROKE INDEX: 37.1 ML/M2
DOP CALC LVOT STROKE VOLUME: 67.32
E WAVE DECELERATION TIME: 166 MS
FRACTIONAL SHORTENING: 35 (ref 28–44)
INTERVENTRICULAR SEPTUM IN DIASTOLE (PARASTERNAL SHORT AXIS VIEW): 0.8 CM
INTERVENTRICULAR SEPTUM: 0.8 CM (ref 0.6–1.1)
LAAS-AP2: 12.4 CM2
LAAS-AP4: 12.9 CM2
LEFT ATRIUM AREA SYSTOLE SINGLE PLANE A4C: 11.6 CM2
LEFT ATRIUM SIZE: 2.8 CM
LEFT INTERNAL DIMENSION IN SYSTOLE: 2.8 CM (ref 2.1–4)
LEFT VENTRICULAR INTERNAL DIMENSION IN DIASTOLE: 4.3 CM (ref 3.5–6)
LEFT VENTRICULAR POSTERIOR WALL IN END DIASTOLE: 0.8 CM
LEFT VENTRICULAR STROKE VOLUME: 52 ML
LVSV (TEICH): 52 ML
MV E'TISSUE VEL-SEP: 18 CM/S
MV PEAK A VEL: 0.46 M/S
MV PEAK E VEL: 88 CM/S
MV STENOSIS PRESSURE HALF TIME: 48 MS
MV VALVE AREA P 1/2 METHOD: 4.58
RIGHT ATRIUM AREA SYSTOLE A4C: 10.4 CM2
RIGHT VENTRICLE ID DIMENSION: 3 CM
SL CV LEFT ATRIUM LENGTH A2C: 4 CM
SL CV LV EF: 60
SL CV PED ECHO LEFT VENTRICLE DIASTOLIC VOLUME (MOD BIPLANE) 2D: 82 ML
SL CV PED ECHO LEFT VENTRICLE SYSTOLIC VOLUME (MOD BIPLANE) 2D: 30 ML
TR MAX PG: 19 MMHG
TR PEAK VELOCITY: 2.2 M/S
TRICUSPID VALVE PEAK REGURGITATION VELOCITY: 2.15 M/S

## 2022-08-08 PROCEDURE — 93306 TTE W/DOPPLER COMPLETE: CPT

## 2022-08-08 PROCEDURE — 93306 TTE W/DOPPLER COMPLETE: CPT | Performed by: INTERNAL MEDICINE

## 2022-08-15 ENCOUNTER — CLINICAL SUPPORT (OUTPATIENT)
Dept: CARDIOLOGY CLINIC | Facility: CLINIC | Age: 32
End: 2022-08-15
Payer: COMMERCIAL

## 2022-08-15 DIAGNOSIS — R00.2 PALPITATIONS: ICD-10-CM

## 2022-08-15 DIAGNOSIS — I49.1 PAC (PREMATURE ATRIAL CONTRACTION): ICD-10-CM

## 2022-08-15 DIAGNOSIS — I49.3 PVC (PREMATURE VENTRICULAR CONTRACTION): ICD-10-CM

## 2022-08-15 PROCEDURE — 93228 REMOTE 30 DAY ECG REV/REPORT: CPT | Performed by: INTERNAL MEDICINE

## 2022-09-11 DIAGNOSIS — G43.009 MIGRAINE WITHOUT AURA AND WITHOUT STATUS MIGRAINOSUS, NOT INTRACTABLE: ICD-10-CM

## 2022-09-12 RX ORDER — AMITRIPTYLINE HYDROCHLORIDE 10 MG/1
TABLET, FILM COATED ORAL
Qty: 90 TABLET | Refills: 1 | OUTPATIENT
Start: 2022-09-12

## (undated) DEVICE — GLOVE PI ULTRA TOUCH SZ.6.5

## (undated) DEVICE — INTENDED FOR TISSUE SEPARATION, AND OTHER PROCEDURES THAT REQUIRE A SHARP SURGICAL BLADE TO PUNCTURE OR CUT.: Brand: BARD-PARKER SAFETY BLADES SIZE 11, STERILE

## (undated) DEVICE — TROCAR: Brand: KII SLEEVE

## (undated) DEVICE — CHLORAPREP HI-LITE 26ML ORANGE

## (undated) DEVICE — TROCAR: Brand: KII FIOS FIRST ENTRY

## (undated) DEVICE — PLASTIC ADHESIVE BANDAGE: Brand: CURITY

## (undated) DEVICE — [HIGH FLOW INSUFFLATOR,  DO NOT USE IF PACKAGE IS DAMAGED,  KEEP DRY,  KEEP AWAY FROM SUNLIGHT,  PROTECT FROM HEAT AND RADIOACTIVE SOURCES.]: Brand: PNEUMOSURE

## (undated) DEVICE — SCD SEQUENTIAL COMPRESSION COMFORT SLEEVE MEDIUM KNEE LENGTH: Brand: KENDALL SCD

## (undated) DEVICE — DRAPE EQUIPMENT RF WAND

## (undated) DEVICE — GLOVE INDICATOR PI UNDERGLOVE SZ 7.5 BLUE

## (undated) DEVICE — GLOVE INDICATOR PI UNDERGLOVE SZ 6.5 BLUE

## (undated) DEVICE — BETHLEHEM UNIVERSAL GYN LAP PK: Brand: CARDINAL HEALTH

## (undated) DEVICE — ENSEAL X1 TISSUE SEALER, CURVED JAW, 37 CM SHAFT LENGTH: Brand: ENSEAL

## (undated) DEVICE — SUT MONOCRYL 4-0 PS-2 27 IN Y426H

## (undated) DEVICE — PVC URETHRAL CATHETER: Brand: DOVER

## (undated) DEVICE — PREMIUM DRY TRAY LF: Brand: MEDLINE INDUSTRIES, INC.

## (undated) DEVICE — TISSUE RETRIEVAL SYSTEM: Brand: INZII RETRIEVAL SYSTEM

## (undated) DEVICE — TUBING SMOKE EVAC W/FILTRATION DEVICE PLUMEPORT ACTIV

## (undated) DEVICE — GLOVE PI ULTRA TOUCH SZ.7.0